# Patient Record
Sex: MALE | Race: WHITE | NOT HISPANIC OR LATINO | Employment: OTHER | RURAL
[De-identification: names, ages, dates, MRNs, and addresses within clinical notes are randomized per-mention and may not be internally consistent; named-entity substitution may affect disease eponyms.]

---

## 2017-03-29 ENCOUNTER — HISTORICAL (OUTPATIENT)
Dept: ADMINISTRATIVE | Facility: HOSPITAL | Age: 72
End: 2017-03-29

## 2017-03-30 LAB
LAB AP CLINICAL INFORMATION: NORMAL
LAB AP DIAGNOSIS - HISTORICAL: NORMAL
LAB AP GROSS PATHOLOGY - HISTORICAL: NORMAL
LAB AP SPECIMEN SUBMITTED - HISTORICAL: NORMAL

## 2018-03-07 ENCOUNTER — HISTORICAL (OUTPATIENT)
Dept: ADMINISTRATIVE | Facility: HOSPITAL | Age: 73
End: 2018-03-07

## 2018-12-12 LAB — COMPLEXED PSA SERPL-MCNC: 0.28 NG/ML

## 2020-08-19 ENCOUNTER — HISTORICAL (OUTPATIENT)
Dept: ADMINISTRATIVE | Facility: HOSPITAL | Age: 75
End: 2020-08-19

## 2020-08-19 LAB
ANION GAP SERPL CALCULATED.3IONS-SCNC: 9 MMOL/L
BASOPHILS # BLD AUTO: 0.07 X10E3/UL (ref 0–0.2)
BASOPHILS NFR BLD AUTO: 0.8 % (ref 0–1)
BUN SERPL-MCNC: 13 MG/DL (ref 7–18)
CALCIUM SERPL-MCNC: 8.7 MG/DL (ref 8.5–10.1)
CHLORIDE SERPL-SCNC: 107 MMOL/L (ref 98–107)
CO2 SERPL-SCNC: 28 MMOL/L (ref 21–32)
CREAT SERPL-MCNC: 1.07 MG/DL (ref 0.7–1.3)
EOSINOPHIL # BLD AUTO: 0.94 X10E3/UL (ref 0–0.5)
EOSINOPHIL NFR BLD AUTO: 11.2 % (ref 1–4)
ERYTHROCYTE [DISTWIDTH] IN BLOOD BY AUTOMATED COUNT: 12.6 % (ref 11.5–14.5)
GLUCOSE SERPL-MCNC: 104 MG/DL (ref 74–106)
HCT VFR BLD AUTO: 42.7 % (ref 40–54)
HGB BLD-MCNC: 14.4 G/DL (ref 13.5–18)
IMM GRANULOCYTES # BLD AUTO: 0.02 X10E3/UL (ref 0–0.04)
IMM GRANULOCYTES NFR BLD: 0.2 % (ref 0–0.4)
LYMPHOCYTES # BLD AUTO: 2.27 X10E3/UL (ref 1–4.8)
LYMPHOCYTES NFR BLD AUTO: 27 % (ref 27–41)
MCH RBC QN AUTO: 32.5 PG (ref 27–31)
MCHC RBC AUTO-ENTMCNC: 33.7 G/DL (ref 32–36)
MCV RBC AUTO: 96.4 FL (ref 80–96)
MONOCYTES # BLD AUTO: 0.74 X10E3/UL (ref 0–0.8)
MONOCYTES NFR BLD AUTO: 8.8 % (ref 2–6)
MPC BLD CALC-MCNC: 9.4 FL (ref 9.4–12.4)
NEUTROPHILS # BLD AUTO: 4.37 X10E3/UL (ref 1.8–7.7)
NEUTROPHILS NFR BLD AUTO: 52 % (ref 53–65)
NRBC # BLD AUTO: 0 X10E3/UL (ref 0–0)
NRBC, AUTO (.00): 0 /100 (ref 0–0)
PLATELET # BLD AUTO: 185 X10E3/UL (ref 150–400)
POTASSIUM SERPL-SCNC: 4.5 MMOL/L (ref 3.5–5.1)
RBC # BLD AUTO: 4.43 X10E6/UL (ref 4.6–6.2)
SODIUM SERPL-SCNC: 139 MMOL/L (ref 136–145)
WBC # BLD AUTO: 8.41 X10E3/UL (ref 4.5–11)

## 2020-08-20 LAB
INSULIN SERPL-ACNC: NORMAL U[IU]/ML
LAB AP CLINICAL INFORMATION: NORMAL
LAB AP COMMENTS: NORMAL
LAB AP DIAGNOSIS - HISTORICAL: NORMAL
LAB AP GROSS PATHOLOGY - HISTORICAL: NORMAL
LAB AP SPECIMEN SUBMITTED - HISTORICAL: NORMAL

## 2021-02-25 LAB
CHOLESTEROL, TOTAL: 194 MG/DL
HDLC SERPL-MCNC: 68 MG/DL
LDLC SERPL CALC-MCNC: 109 MG/DL
TRIGLYCERIDE (LIPID PAN): 84 MG/DL

## 2021-03-22 ENCOUNTER — PROCEDURE VISIT (OUTPATIENT)
Dept: UROLOGY | Facility: CLINIC | Age: 76
End: 2021-03-22
Payer: MEDICARE

## 2021-03-22 VITALS
HEART RATE: 84 BPM | HEIGHT: 70 IN | WEIGHT: 171 LBS | DIASTOLIC BLOOD PRESSURE: 71 MMHG | BODY MASS INDEX: 24.48 KG/M2 | SYSTOLIC BLOOD PRESSURE: 138 MMHG

## 2021-03-22 DIAGNOSIS — R82.90 URINE FINDING: ICD-10-CM

## 2021-03-22 DIAGNOSIS — C67.9 MALIGNANT NEOPLASM OF URINARY BLADDER, UNSPECIFIED SITE: Primary | ICD-10-CM

## 2021-03-22 LAB
BILIRUB SERPL-MCNC: NORMAL MG/DL
BLOOD URINE, POC: NORMAL
COLOR, POC UA: NORMAL
GLUCOSE UR QL STRIP: NORMAL
KETONES UR QL STRIP: NORMAL
LEUKOCYTE ESTERASE URINE, POC: NORMAL
NITRITE, POC UA: NORMAL
PH, POC UA: 5
PROTEIN, POC: NORMAL
SPECIFIC GRAVITY, POC UA: 1.02
UROBILINOGEN, POC UA: NORMAL

## 2021-03-22 PROCEDURE — 52000 CYSTOURETHROSCOPY: CPT | Mod: PBBFAC | Performed by: UROLOGY

## 2021-03-22 PROCEDURE — 52000 CYSTOSCOPY: ICD-10-PCS | Mod: S$PBB,,, | Performed by: UROLOGY

## 2021-03-22 PROCEDURE — 81001 URINALYSIS AUTO W/SCOPE: CPT | Mod: PBBFAC | Performed by: UROLOGY

## 2021-03-22 PROCEDURE — 81003 URINALYSIS AUTO W/O SCOPE: CPT | Mod: PBBFAC | Performed by: UROLOGY

## 2021-03-22 RX ORDER — TAMSULOSIN HYDROCHLORIDE 0.4 MG/1
0.4 CAPSULE ORAL DAILY
COMMUNITY
End: 2022-06-28 | Stop reason: SDUPTHER

## 2021-03-22 RX ORDER — TRAZODONE HYDROCHLORIDE 100 MG/1
100 TABLET ORAL NIGHTLY
COMMUNITY
End: 2021-12-09 | Stop reason: SDUPTHER

## 2021-03-22 RX ORDER — LISINOPRIL 20 MG/1
20 TABLET ORAL DAILY
COMMUNITY
End: 2022-01-31 | Stop reason: SDUPTHER

## 2021-03-22 RX ORDER — HYDROCODONE BITARTRATE AND ACETAMINOPHEN 7.5; 325 MG/1; MG/1
1 TABLET ORAL EVERY 12 HOURS PRN
Status: ON HOLD | COMMUNITY
Start: 2021-01-21 | End: 2022-03-09 | Stop reason: ALTCHOICE

## 2021-07-13 DIAGNOSIS — C67.9 PRIMARY MALIGNANT NEOPLASM OF BLADDER: Primary | ICD-10-CM

## 2021-08-17 ENCOUNTER — PROCEDURE VISIT (OUTPATIENT)
Dept: UROLOGY | Facility: CLINIC | Age: 76
End: 2021-08-17
Payer: MEDICARE

## 2021-08-17 VITALS
BODY MASS INDEX: 23.77 KG/M2 | HEIGHT: 70 IN | WEIGHT: 166 LBS | HEART RATE: 71 BPM | SYSTOLIC BLOOD PRESSURE: 141 MMHG | DIASTOLIC BLOOD PRESSURE: 74 MMHG

## 2021-08-17 DIAGNOSIS — C67.9 MALIGNANT NEOPLASM OF URINARY BLADDER, UNSPECIFIED SITE: ICD-10-CM

## 2021-08-17 DIAGNOSIS — C67.9 PRIMARY MALIGNANT NEOPLASM OF BLADDER: Primary | ICD-10-CM

## 2021-08-17 PROCEDURE — 52000 CYSTOURETHROSCOPY: CPT | Mod: PBBFAC | Performed by: UROLOGY

## 2021-08-17 PROCEDURE — 52000 CYSTOURETHROSCOPY: CPT | Mod: S$PBB,,, | Performed by: UROLOGY

## 2021-08-17 PROCEDURE — 52000 PR CYSTOURETHROSCOPY: ICD-10-PCS | Mod: S$PBB,,, | Performed by: UROLOGY

## 2021-08-27 ENCOUNTER — OFFICE VISIT (OUTPATIENT)
Dept: FAMILY MEDICINE | Facility: CLINIC | Age: 76
End: 2021-08-27
Payer: MEDICARE

## 2021-08-27 VITALS
HEIGHT: 70 IN | DIASTOLIC BLOOD PRESSURE: 60 MMHG | HEART RATE: 50 BPM | SYSTOLIC BLOOD PRESSURE: 158 MMHG | RESPIRATION RATE: 18 BRPM | WEIGHT: 164.63 LBS | BODY MASS INDEX: 23.57 KG/M2

## 2021-08-27 DIAGNOSIS — E78.2 MIXED HYPERLIPIDEMIA: ICD-10-CM

## 2021-08-27 DIAGNOSIS — C44.90 SKIN CANCER: ICD-10-CM

## 2021-08-27 DIAGNOSIS — I10 ESSENTIAL HYPERTENSION: Primary | ICD-10-CM

## 2021-08-27 LAB
ALBUMIN SERPL BCP-MCNC: 4 G/DL (ref 3.5–5)
ALBUMIN/GLOB SERPL: 1.1 {RATIO}
ALP SERPL-CCNC: 79 U/L (ref 45–115)
ALT SERPL W P-5'-P-CCNC: 21 U/L (ref 16–61)
ANION GAP SERPL CALCULATED.3IONS-SCNC: 9 MMOL/L (ref 7–16)
AST SERPL W P-5'-P-CCNC: 18 U/L (ref 15–37)
BASOPHILS # BLD AUTO: 0.08 K/UL (ref 0–0.2)
BASOPHILS NFR BLD AUTO: 0.9 % (ref 0–1)
BILIRUB SERPL-MCNC: 0.9 MG/DL (ref 0–1.2)
BUN SERPL-MCNC: 14 MG/DL (ref 7–18)
BUN/CREAT SERPL: 14 (ref 6–20)
CALCIUM SERPL-MCNC: 9.2 MG/DL (ref 8.5–10.1)
CHLORIDE SERPL-SCNC: 110 MMOL/L (ref 98–107)
CHOLEST SERPL-MCNC: 208 MG/DL (ref 0–200)
CHOLEST/HDLC SERPL: 2.8 {RATIO}
CO2 SERPL-SCNC: 28 MMOL/L (ref 21–32)
CREAT SERPL-MCNC: 1.03 MG/DL (ref 0.7–1.3)
DIFFERENTIAL METHOD BLD: ABNORMAL
EOSINOPHIL # BLD AUTO: 0.73 K/UL (ref 0–0.5)
EOSINOPHIL NFR BLD AUTO: 8.2 % (ref 1–4)
ERYTHROCYTE [DISTWIDTH] IN BLOOD BY AUTOMATED COUNT: 13.1 % (ref 11.5–14.5)
GLOBULIN SER-MCNC: 3.5 G/DL (ref 2–4)
GLUCOSE SERPL-MCNC: 106 MG/DL (ref 74–106)
HCT VFR BLD AUTO: 45.3 % (ref 40–54)
HDLC SERPL-MCNC: 74 MG/DL (ref 40–60)
HGB BLD-MCNC: 15.3 G/DL (ref 13.5–18)
IMM GRANULOCYTES # BLD AUTO: 0.03 K/UL (ref 0–0.04)
IMM GRANULOCYTES NFR BLD: 0.3 % (ref 0–0.4)
LDLC SERPL CALC-MCNC: 112 MG/DL
LDLC/HDLC SERPL: 1.5 {RATIO}
LYMPHOCYTES # BLD AUTO: 2.97 K/UL (ref 1–4.8)
LYMPHOCYTES NFR BLD AUTO: 33.3 % (ref 27–41)
MCH RBC QN AUTO: 32 PG (ref 27–31)
MCHC RBC AUTO-ENTMCNC: 33.8 G/DL (ref 32–36)
MCV RBC AUTO: 94.8 FL (ref 80–96)
MONOCYTES # BLD AUTO: 0.64 K/UL (ref 0–0.8)
MONOCYTES NFR BLD AUTO: 7.2 % (ref 2–6)
MPC BLD CALC-MCNC: 10.2 FL (ref 9.4–12.4)
NEUTROPHILS # BLD AUTO: 4.47 K/UL (ref 1.8–7.7)
NEUTROPHILS NFR BLD AUTO: 50.1 % (ref 53–65)
NONHDLC SERPL-MCNC: 134 MG/DL
NRBC # BLD AUTO: 0 X10E3/UL
NRBC, AUTO (.00): 0 %
PLATELET # BLD AUTO: 224 K/UL (ref 150–400)
POTASSIUM SERPL-SCNC: 5.2 MMOL/L (ref 3.5–5.1)
PROT SERPL-MCNC: 7.5 G/DL (ref 6.4–8.2)
RBC # BLD AUTO: 4.78 M/UL (ref 4.6–6.2)
SODIUM SERPL-SCNC: 142 MMOL/L (ref 136–145)
TRIGL SERPL-MCNC: 108 MG/DL (ref 35–150)
VLDLC SERPL-MCNC: 22 MG/DL
WBC # BLD AUTO: 8.92 K/UL (ref 4.5–11)

## 2021-08-27 PROCEDURE — 85025 COMPLETE CBC W/AUTO DIFF WBC: CPT | Mod: ,,, | Performed by: CLINICAL MEDICAL LABORATORY

## 2021-08-27 PROCEDURE — 80053 COMPREHENSIVE METABOLIC PANEL: ICD-10-PCS | Mod: ,,, | Performed by: CLINICAL MEDICAL LABORATORY

## 2021-08-27 PROCEDURE — 85025 CBC WITH DIFFERENTIAL: ICD-10-PCS | Mod: ,,, | Performed by: CLINICAL MEDICAL LABORATORY

## 2021-08-27 PROCEDURE — 80061 LIPID PANEL: ICD-10-PCS | Mod: ,,, | Performed by: CLINICAL MEDICAL LABORATORY

## 2021-08-27 PROCEDURE — 99214 PR OFFICE/OUTPT VISIT, EST, LEVL IV, 30-39 MIN: ICD-10-PCS | Mod: ,,, | Performed by: FAMILY MEDICINE

## 2021-08-27 PROCEDURE — 99214 OFFICE O/P EST MOD 30 MIN: CPT | Mod: ,,, | Performed by: FAMILY MEDICINE

## 2021-08-27 PROCEDURE — 80061 LIPID PANEL: CPT | Mod: ,,, | Performed by: CLINICAL MEDICAL LABORATORY

## 2021-08-27 PROCEDURE — 80053 COMPREHEN METABOLIC PANEL: CPT | Mod: ,,, | Performed by: CLINICAL MEDICAL LABORATORY

## 2021-08-27 RX ORDER — LISINOPRIL 20 MG/1
20 TABLET ORAL DAILY
Qty: 90 TABLET | Refills: 3 | Status: CANCELLED | OUTPATIENT
Start: 2021-08-27

## 2021-08-27 RX ORDER — LISINOPRIL AND HYDROCHLOROTHIAZIDE 12.5; 2 MG/1; MG/1
1 TABLET ORAL DAILY
Qty: 90 TABLET | Refills: 3 | Status: SHIPPED | OUTPATIENT
Start: 2021-08-27 | End: 2022-01-31

## 2021-10-15 ENCOUNTER — OFFICE VISIT (OUTPATIENT)
Dept: DERMATOLOGY | Facility: CLINIC | Age: 76
End: 2021-10-15
Payer: MEDICARE

## 2021-10-15 VITALS — HEIGHT: 70 IN | RESPIRATION RATE: 20 BRPM | BODY MASS INDEX: 23.58 KG/M2 | WEIGHT: 164.69 LBS

## 2021-10-15 DIAGNOSIS — L82.1 SEBORRHEIC KERATOSES: ICD-10-CM

## 2021-10-15 DIAGNOSIS — L57.0 ACTINIC KERATOSES: ICD-10-CM

## 2021-10-15 DIAGNOSIS — C44.90 SKIN CANCER: ICD-10-CM

## 2021-10-15 DIAGNOSIS — D48.9 NEOPLASM OF UNCERTAIN BEHAVIOR: Primary | ICD-10-CM

## 2021-10-15 PROCEDURE — 99203 OFFICE O/P NEW LOW 30 MIN: CPT | Mod: 25,,, | Performed by: STUDENT IN AN ORGANIZED HEALTH CARE EDUCATION/TRAINING PROGRAM

## 2021-10-15 PROCEDURE — 17000 DESTRUCT PREMALG LESION: CPT | Mod: XU,,, | Performed by: STUDENT IN AN ORGANIZED HEALTH CARE EDUCATION/TRAINING PROGRAM

## 2021-10-15 PROCEDURE — 17003 DESTRUCT PREMALG LES 2-14: CPT | Mod: XU,,, | Performed by: STUDENT IN AN ORGANIZED HEALTH CARE EDUCATION/TRAINING PROGRAM

## 2021-10-15 PROCEDURE — 99203 PR OFFICE/OUTPT VISIT, NEW, LEVL III, 30-44 MIN: ICD-10-PCS | Mod: 25,,, | Performed by: STUDENT IN AN ORGANIZED HEALTH CARE EDUCATION/TRAINING PROGRAM

## 2021-10-15 PROCEDURE — 17000 PR DESTRUCTION(LASER SURGERY,CRYOSURGERY,CHEMOSURGERY),PREMALIGNANT LESIONS,FIRST LESION: ICD-10-PCS | Mod: XU,,, | Performed by: STUDENT IN AN ORGANIZED HEALTH CARE EDUCATION/TRAINING PROGRAM

## 2021-10-15 PROCEDURE — 11102 TANGNTL BX SKIN SINGLE LES: CPT | Mod: ,,, | Performed by: STUDENT IN AN ORGANIZED HEALTH CARE EDUCATION/TRAINING PROGRAM

## 2021-10-15 PROCEDURE — 88305 TISSUE EXAM BY PATHOLOGIST: CPT | Mod: 26,,, | Performed by: PATHOLOGY

## 2021-10-15 PROCEDURE — 17003 DESTRUCTION, PREMALIGNANT LESIONS; SECOND THROUGH 14 LESIONS: ICD-10-PCS | Mod: XU,,, | Performed by: STUDENT IN AN ORGANIZED HEALTH CARE EDUCATION/TRAINING PROGRAM

## 2021-10-15 PROCEDURE — 88305 PATHOLOGY, DERMATOLOGY: ICD-10-PCS | Mod: 26,,, | Performed by: PATHOLOGY

## 2021-10-15 PROCEDURE — 11102 PR TANGENTIAL BIOPSY, SKIN, SINGLE LESION: ICD-10-PCS | Mod: ,,, | Performed by: STUDENT IN AN ORGANIZED HEALTH CARE EDUCATION/TRAINING PROGRAM

## 2021-10-15 PROCEDURE — 11103 TANGNTL BX SKIN EA SEP/ADDL: CPT | Mod: ,,, | Performed by: STUDENT IN AN ORGANIZED HEALTH CARE EDUCATION/TRAINING PROGRAM

## 2021-10-15 PROCEDURE — 88305 TISSUE EXAM BY PATHOLOGIST: CPT | Mod: SUR | Performed by: STUDENT IN AN ORGANIZED HEALTH CARE EDUCATION/TRAINING PROGRAM

## 2021-10-15 PROCEDURE — 11103 PR TANGENTIAL BIOPSY, SKIN, EA ADDTL LESION: ICD-10-PCS | Mod: ,,, | Performed by: STUDENT IN AN ORGANIZED HEALTH CARE EDUCATION/TRAINING PROGRAM

## 2021-10-20 LAB
DHEA SERPL-MCNC: NORMAL
ESTROGEN SERPL-MCNC: NORMAL PG/ML
ESTROGEN SERPL-MCNC: NORMAL PG/ML
LAB AP GROSS DESCRIPTION: NORMAL
LAB AP GROSS DESCRIPTION: NORMAL
LAB AP LABORATORY NOTES: NORMAL
LAB AP LABORATORY NOTES: NORMAL
LAB AP SPEC A DDX: NORMAL
LAB AP SPEC A DDX: NORMAL
LAB AP SPEC A MORPHOLOGY: NORMAL
LAB AP SPEC A MORPHOLOGY: NORMAL
LAB AP SPEC A PROCEDURE: NORMAL
LAB AP SPEC A PROCEDURE: NORMAL
T3RU NFR SERPL: NORMAL %
T3RU NFR SERPL: NORMAL %

## 2021-12-01 ENCOUNTER — PROCEDURE VISIT (OUTPATIENT)
Dept: DERMATOLOGY | Facility: CLINIC | Age: 76
End: 2021-12-01
Payer: MEDICARE

## 2021-12-01 VITALS
HEART RATE: 49 BPM | HEIGHT: 70 IN | BODY MASS INDEX: 23.48 KG/M2 | SYSTOLIC BLOOD PRESSURE: 164 MMHG | DIASTOLIC BLOOD PRESSURE: 74 MMHG | WEIGHT: 164 LBS | RESPIRATION RATE: 18 BRPM

## 2021-12-01 DIAGNOSIS — C44.319 BASAL CELL CARCINOMA (BCC) OF RIGHT CHEEK: Primary | ICD-10-CM

## 2021-12-01 PROCEDURE — 17312 MOHS ADDL STAGE: CPT | Mod: ,,, | Performed by: STUDENT IN AN ORGANIZED HEALTH CARE EDUCATION/TRAINING PROGRAM

## 2021-12-01 PROCEDURE — 12052 INTMD RPR FACE/MM 2.6-5.0 CM: CPT | Mod: 51,,, | Performed by: STUDENT IN AN ORGANIZED HEALTH CARE EDUCATION/TRAINING PROGRAM

## 2021-12-01 PROCEDURE — 17311: ICD-10-PCS | Mod: ,,, | Performed by: STUDENT IN AN ORGANIZED HEALTH CARE EDUCATION/TRAINING PROGRAM

## 2021-12-01 PROCEDURE — 17312: ICD-10-PCS | Mod: ,,, | Performed by: STUDENT IN AN ORGANIZED HEALTH CARE EDUCATION/TRAINING PROGRAM

## 2021-12-01 PROCEDURE — 12052 PR INTERMED WOUND REPAIR FACE/EAR/EYELID/NOSE/LIP/MUC MEBR, 2.6 TO 5.0CM: ICD-10-PCS | Mod: 51,,, | Performed by: STUDENT IN AN ORGANIZED HEALTH CARE EDUCATION/TRAINING PROGRAM

## 2021-12-01 PROCEDURE — 17311 MOHS 1 STAGE H/N/HF/G: CPT | Mod: ,,, | Performed by: STUDENT IN AN ORGANIZED HEALTH CARE EDUCATION/TRAINING PROGRAM

## 2021-12-08 ENCOUNTER — PROCEDURE VISIT (OUTPATIENT)
Dept: DERMATOLOGY | Facility: CLINIC | Age: 76
End: 2021-12-08
Payer: MEDICARE

## 2021-12-08 VITALS
RESPIRATION RATE: 18 BRPM | SYSTOLIC BLOOD PRESSURE: 190 MMHG | HEIGHT: 70 IN | WEIGHT: 164 LBS | DIASTOLIC BLOOD PRESSURE: 87 MMHG | BODY MASS INDEX: 23.48 KG/M2

## 2021-12-08 DIAGNOSIS — C44.719 BASAL CELL CARCINOMA (BCC) OF LEFT LOWER LEG: Primary | ICD-10-CM

## 2021-12-08 PROCEDURE — 17314 MOHS ADDL STAGE T/A/L: CPT | Mod: 79,,, | Performed by: STUDENT IN AN ORGANIZED HEALTH CARE EDUCATION/TRAINING PROGRAM

## 2021-12-08 PROCEDURE — 17314: ICD-10-PCS | Mod: 79,,, | Performed by: STUDENT IN AN ORGANIZED HEALTH CARE EDUCATION/TRAINING PROGRAM

## 2021-12-08 PROCEDURE — 12032 INTMD RPR S/A/T/EXT 2.6-7.5: CPT | Mod: 79,51,, | Performed by: STUDENT IN AN ORGANIZED HEALTH CARE EDUCATION/TRAINING PROGRAM

## 2021-12-08 PROCEDURE — 12032 PR LAYR CLOS WND TRUNK,ARM,LEG 2.6-7.5 CM: ICD-10-PCS | Mod: 79,51,, | Performed by: STUDENT IN AN ORGANIZED HEALTH CARE EDUCATION/TRAINING PROGRAM

## 2021-12-08 PROCEDURE — 17313 MOHS 1 STAGE T/A/L: CPT | Mod: 79,,, | Performed by: STUDENT IN AN ORGANIZED HEALTH CARE EDUCATION/TRAINING PROGRAM

## 2021-12-08 PROCEDURE — 17313: ICD-10-PCS | Mod: 79,,, | Performed by: STUDENT IN AN ORGANIZED HEALTH CARE EDUCATION/TRAINING PROGRAM

## 2021-12-09 RX ORDER — TRAZODONE HYDROCHLORIDE 100 MG/1
100 TABLET ORAL NIGHTLY
Qty: 30 TABLET | Refills: 0 | Status: SHIPPED | OUTPATIENT
Start: 2021-12-09 | End: 2022-01-03 | Stop reason: SDUPTHER

## 2021-12-15 ENCOUNTER — PROCEDURE VISIT (OUTPATIENT)
Dept: UROLOGY | Facility: CLINIC | Age: 76
End: 2021-12-15
Payer: MEDICARE

## 2021-12-15 VITALS
BODY MASS INDEX: 24.77 KG/M2 | HEART RATE: 86 BPM | DIASTOLIC BLOOD PRESSURE: 76 MMHG | HEIGHT: 70 IN | WEIGHT: 173 LBS | SYSTOLIC BLOOD PRESSURE: 158 MMHG

## 2021-12-15 DIAGNOSIS — N32.89 DYSTROPHIC CALCIFICATION OF BLADDER: Primary | ICD-10-CM

## 2021-12-15 DIAGNOSIS — Z01.810 PRE-PROCEDURAL CARDIOVASCULAR EXAMINATION: ICD-10-CM

## 2021-12-15 DIAGNOSIS — C67.9 PRIMARY MALIGNANT NEOPLASM OF BLADDER: ICD-10-CM

## 2021-12-15 DIAGNOSIS — N39.0 URINARY TRACT INFECTION WITH HEMATURIA, SITE UNSPECIFIED: ICD-10-CM

## 2021-12-15 DIAGNOSIS — R31.9 URINARY TRACT INFECTION WITH HEMATURIA, SITE UNSPECIFIED: ICD-10-CM

## 2021-12-15 DIAGNOSIS — Z01.818 PREOP TESTING: ICD-10-CM

## 2021-12-15 PROCEDURE — 87086 CULTURE, URINE: ICD-10-PCS | Mod: ,,, | Performed by: CLINICAL MEDICAL LABORATORY

## 2021-12-15 PROCEDURE — 52000 CYSTOURETHROSCOPY: CPT | Mod: PBBFAC | Performed by: UROLOGY

## 2021-12-15 PROCEDURE — 87086 URINE CULTURE/COLONY COUNT: CPT | Mod: ,,, | Performed by: CLINICAL MEDICAL LABORATORY

## 2021-12-15 PROCEDURE — 52000 CYSTOURETHROSCOPY: CPT | Mod: S$PBB,,, | Performed by: UROLOGY

## 2021-12-15 PROCEDURE — 52000 PR CYSTOURETHROSCOPY: ICD-10-PCS | Mod: S$PBB,,, | Performed by: UROLOGY

## 2021-12-15 RX ORDER — LIDOCAINE HYDROCHLORIDE 20 MG/ML
JELLY TOPICAL
Status: COMPLETED | OUTPATIENT
Start: 2021-12-15 | End: 2021-12-15

## 2021-12-15 RX ADMIN — LIDOCAINE HYDROCHLORIDE 10 ML: 20 JELLY TOPICAL at 04:12

## 2021-12-17 ENCOUNTER — CLINICAL SUPPORT (OUTPATIENT)
Dept: DERMATOLOGY | Facility: CLINIC | Age: 76
End: 2021-12-17
Payer: MEDICARE

## 2021-12-17 DIAGNOSIS — L08.9 SKIN INFECTION: ICD-10-CM

## 2021-12-17 DIAGNOSIS — Z48.89 ENCOUNTER FOR POST SURGICAL WOUND CHECK: Primary | ICD-10-CM

## 2021-12-17 LAB — UA COMPLETE W REFLEX CULTURE PNL UR: NORMAL

## 2021-12-17 PROCEDURE — 99499 UNLISTED E&M SERVICE: CPT | Mod: ,,, | Performed by: STUDENT IN AN ORGANIZED HEALTH CARE EDUCATION/TRAINING PROGRAM

## 2021-12-17 PROCEDURE — 99499 NO LOS: ICD-10-PCS | Mod: ,,, | Performed by: STUDENT IN AN ORGANIZED HEALTH CARE EDUCATION/TRAINING PROGRAM

## 2021-12-17 RX ORDER — CLINDAMYCIN HYDROCHLORIDE 300 MG/1
300 CAPSULE ORAL EVERY 6 HOURS
Qty: 28 CAPSULE | Refills: 0 | Status: SHIPPED | OUTPATIENT
Start: 2021-12-17 | End: 2021-12-24

## 2021-12-20 PROCEDURE — 87186 SC STD MICRODIL/AGAR DIL: CPT | Mod: ,,, | Performed by: CLINICAL MEDICAL LABORATORY

## 2021-12-20 PROCEDURE — 87070 CULTURE, WOUND: ICD-10-PCS | Mod: ,,, | Performed by: CLINICAL MEDICAL LABORATORY

## 2021-12-20 PROCEDURE — 87077 CULTURE, WOUND: ICD-10-PCS | Mod: ,,, | Performed by: CLINICAL MEDICAL LABORATORY

## 2021-12-20 PROCEDURE — 87070 CULTURE OTHR SPECIMN AEROBIC: CPT | Mod: ,,, | Performed by: CLINICAL MEDICAL LABORATORY

## 2021-12-20 PROCEDURE — 87077 CULTURE AEROBIC IDENTIFY: CPT | Mod: ,,, | Performed by: CLINICAL MEDICAL LABORATORY

## 2021-12-20 PROCEDURE — 87186 CULTURE, WOUND: ICD-10-PCS | Mod: ,,, | Performed by: CLINICAL MEDICAL LABORATORY

## 2021-12-21 ENCOUNTER — CLINICAL SUPPORT (OUTPATIENT)
Dept: DERMATOLOGY | Facility: CLINIC | Age: 76
End: 2021-12-21
Payer: MEDICARE

## 2021-12-21 DIAGNOSIS — Z48.89 ENCOUNTER FOR POST SURGICAL WOUND CHECK: Primary | ICD-10-CM

## 2021-12-22 LAB — MICROORGANISM SPEC CULT: ABNORMAL

## 2022-01-05 ENCOUNTER — TELEPHONE (OUTPATIENT)
Dept: FAMILY MEDICINE | Facility: CLINIC | Age: 77
End: 2022-01-05
Payer: MEDICARE

## 2022-01-07 ENCOUNTER — CLINICAL SUPPORT (OUTPATIENT)
Dept: DERMATOLOGY | Facility: CLINIC | Age: 77
End: 2022-01-07
Payer: MEDICARE

## 2022-01-07 DIAGNOSIS — Z51.89 VISIT FOR WOUND CHECK: Primary | ICD-10-CM

## 2022-01-07 PROCEDURE — 99499 UNLISTED E&M SERVICE: CPT | Mod: ,,, | Performed by: STUDENT IN AN ORGANIZED HEALTH CARE EDUCATION/TRAINING PROGRAM

## 2022-01-07 PROCEDURE — 99499 NO LOS: ICD-10-PCS | Mod: ,,, | Performed by: STUDENT IN AN ORGANIZED HEALTH CARE EDUCATION/TRAINING PROGRAM

## 2022-01-07 NOTE — PROGRESS NOTES
Wound healing well but pt report shooting pain in the wound at night. MRSA infection appears resolved. Discussed this could be sensory nerves regenerating. Will monitor for now

## 2022-01-31 RX ORDER — TRAZODONE HYDROCHLORIDE 100 MG/1
100 TABLET ORAL NIGHTLY
Qty: 30 TABLET | Refills: 1 | Status: SHIPPED | OUTPATIENT
Start: 2022-01-31 | End: 2022-04-04 | Stop reason: SDUPTHER

## 2022-01-31 RX ORDER — LISINOPRIL 20 MG/1
20 TABLET ORAL DAILY
Qty: 30 TABLET | Refills: 3 | Status: SHIPPED | OUTPATIENT
Start: 2022-01-31 | End: 2022-07-25

## 2022-03-07 ENCOUNTER — CLINICAL SUPPORT (OUTPATIENT)
Dept: CARDIOLOGY | Facility: CLINIC | Age: 77
End: 2022-03-07
Payer: MEDICARE

## 2022-03-07 ENCOUNTER — OFFICE VISIT (OUTPATIENT)
Dept: UROLOGY | Facility: CLINIC | Age: 77
End: 2022-03-07
Payer: MEDICARE

## 2022-03-07 VITALS
SYSTOLIC BLOOD PRESSURE: 176 MMHG | WEIGHT: 175 LBS | DIASTOLIC BLOOD PRESSURE: 86 MMHG | HEIGHT: 70 IN | HEART RATE: 69 BPM | BODY MASS INDEX: 25.05 KG/M2

## 2022-03-07 DIAGNOSIS — Z01.812 ENCOUNTER FOR PREOPERATIVE SCREENING LABORATORY TESTING FOR COVID-19 VIRUS: ICD-10-CM

## 2022-03-07 DIAGNOSIS — C67.9 TRANSITIONAL CELL CARCINOMA OF BLADDER: Primary | ICD-10-CM

## 2022-03-07 DIAGNOSIS — C67.9 MALIGNANT NEOPLASM OF URINARY BLADDER, UNSPECIFIED SITE: Primary | ICD-10-CM

## 2022-03-07 DIAGNOSIS — R31.9 URINARY TRACT INFECTION WITH HEMATURIA, SITE UNSPECIFIED: ICD-10-CM

## 2022-03-07 DIAGNOSIS — N39.0 URINARY TRACT INFECTION WITH HEMATURIA, SITE UNSPECIFIED: ICD-10-CM

## 2022-03-07 DIAGNOSIS — Z01.810 PRE-PROCEDURAL CARDIOVASCULAR EXAMINATION: ICD-10-CM

## 2022-03-07 DIAGNOSIS — Z11.52 ENCOUNTER FOR PREOPERATIVE SCREENING LABORATORY TESTING FOR COVID-19 VIRUS: ICD-10-CM

## 2022-03-07 PROCEDURE — 93005 ELECTROCARDIOGRAM TRACING: CPT | Mod: PBBFAC | Performed by: INTERNAL MEDICINE

## 2022-03-07 PROCEDURE — 99213 OFFICE O/P EST LOW 20 MIN: CPT | Mod: S$PBB,,, | Performed by: UROLOGY

## 2022-03-07 PROCEDURE — 87086 CULTURE, URINE: ICD-10-PCS | Mod: ,,, | Performed by: CLINICAL MEDICAL LABORATORY

## 2022-03-07 PROCEDURE — 99213 OFFICE O/P EST LOW 20 MIN: CPT | Mod: PBBFAC | Performed by: UROLOGY

## 2022-03-07 PROCEDURE — 93010 EKG 12-LEAD: ICD-10-PCS | Mod: S$PBB,,, | Performed by: INTERNAL MEDICINE

## 2022-03-07 PROCEDURE — 87186 SC STD MICRODIL/AGAR DIL: CPT | Mod: ,,, | Performed by: CLINICAL MEDICAL LABORATORY

## 2022-03-07 PROCEDURE — 87077 CULTURE AEROBIC IDENTIFY: CPT | Mod: ,,, | Performed by: CLINICAL MEDICAL LABORATORY

## 2022-03-07 PROCEDURE — 87077 CULTURE, URINE: ICD-10-PCS | Mod: ,,, | Performed by: CLINICAL MEDICAL LABORATORY

## 2022-03-07 PROCEDURE — 87186 CULTURE, URINE: ICD-10-PCS | Mod: ,,, | Performed by: CLINICAL MEDICAL LABORATORY

## 2022-03-07 PROCEDURE — 99212 OFFICE O/P EST SF 10 MIN: CPT | Mod: PBBFAC,27

## 2022-03-07 PROCEDURE — 99213 PR OFFICE/OUTPT VISIT, EST, LEVL III, 20-29 MIN: ICD-10-PCS | Mod: S$PBB,,, | Performed by: UROLOGY

## 2022-03-07 PROCEDURE — 87086 URINE CULTURE/COLONY COUNT: CPT | Mod: ,,, | Performed by: CLINICAL MEDICAL LABORATORY

## 2022-03-07 PROCEDURE — 93010 ELECTROCARDIOGRAM REPORT: CPT | Mod: S$PBB,,, | Performed by: INTERNAL MEDICINE

## 2022-03-07 NOTE — H&P (VIEW-ONLY)
Subjective:       Patient ID: Ruben Figueroa is a 76 y.o. male.    Chief Complaint: Pre-op Exam (CBC, BMP, COVID, EKG)      Follow up in 2 months (on 2/14/2022) for Pre op workup; CBC, BMP lab and EKG prior to visit.  Patient seems to have more problem with abnormal calcifications of the bladder than from primary bladder tumor although I cannot rule out underlying recurrence of TCC.  Patient's bladder needs to have a calcification removed and underlying tissue biopsied to make sure there is not recurrent TCC.  That will have to be done under anesthesia because of the pain involved.  Mr. Figueroa wants to wait a couple months before doing that and that is probably reasonable.  We will plan on surgery Wednesday February 16th.  Return for workup on Monday February 14.  Patient follow-up blood pressure 158/76.  Apparently blood pressure has been running a little high lately.  He was given samples of hydrochlorothiazide 25 mg.  He needs to continue to monitor blood pressure to see if addition of HCTZ helpful.  Probably needs to be monitored by Dr. Gonzalez. (12/15/2021)    The note above is from the December visit for cystoscopy.  Patient still has extensive calcifications overlying the tumor sites and needs to have biopsies of the tissue as well as removal of the calcified tissue.  Bladder is thin where he has had previous tumors removed.  Patient postponed the procedure in extra month because of family problems.  He is doing reasonably well.  Voiding satisfactorily with aid of tamsulosin.  He is ready to proceed with the procedure for removal a calcification and bladder biopsies.  Surgery scheduled for Wednesday.  (March 7, 2022)             Review of Systems      Objective:      Physical Exam  Constitutional:       Appearance: Normal appearance. He is normal weight.   HENT:      Right Ear: External ear normal.      Left Ear: External ear normal.      Mouth/Throat:      Mouth: Mucous membranes are moist.      Pharynx:  Oropharynx is clear.   Eyes:      Pupils: Pupils are equal, round, and reactive to light.   Neck:      Vascular: No carotid bruit.   Cardiovascular:      Rate and Rhythm: Normal rate. Rhythm irregular.      Comments: Occasional premature contractions  Pulmonary:      Effort: Pulmonary effort is normal.      Breath sounds: Normal breath sounds.   Abdominal:      Palpations: There is no mass.      Tenderness: There is no abdominal tenderness. There is no right CVA tenderness, left CVA tenderness or guarding.   Musculoskeletal:      Cervical back: Neck supple.   Skin:     General: Skin is warm and dry.   Neurological:      General: No focal deficit present.      Mental Status: He is alert.   Psychiatric:         Mood and Affect: Mood normal.         Behavior: Behavior normal.       urinalysis suggestive of infection as there was a lot of amorphous material in probable bacteria on microscopic urinalysis.  Several pus cells also noted along with the amorphous crystals.  The dipstick is nitrite positive with 3+ blood, 2+ leukocytes, trace of glucose, pH 6.0, and urine specific gravity 1.020.  Assessment:       Problem List Items Addressed This Visit    None     Visit Diagnoses     Transitional cell carcinoma of bladder    -  Primary    Encounter for preoperative screening laboratory testing for COVID-19 virus        Relevant Orders    COVID-19 Routine Screening (Completed)    Urinary tract infection with hematuria, site unspecified        Relevant Orders    Urine culture          Plan:     Lab data satisfactory.  Urinalysis suggested infection.    Patient given Cipro 500 mg b.i.d. for 3 days while awaiting culture results.  He will be admitted to outpatient surgery Wednesday March 9 at 5:30 a.m. and be NPO other than morning medications.  Instructions for preadmission given by Ms. Fernandez and understood by Mr. Figueroa.  Informed consent obtained.  *

## 2022-03-08 DIAGNOSIS — C67.9 TRANSITIONAL CELL CARCINOMA OF BLADDER: Primary | ICD-10-CM

## 2022-03-08 RX ORDER — SODIUM CHLORIDE, SODIUM LACTATE, POTASSIUM CHLORIDE, CALCIUM CHLORIDE 600; 310; 30; 20 MG/100ML; MG/100ML; MG/100ML; MG/100ML
INJECTION, SOLUTION INTRAVENOUS CONTINUOUS
Status: CANCELLED | OUTPATIENT
Start: 2022-03-09

## 2022-03-08 NOTE — PATIENT INSTRUCTIONS
Lab data satisfactory.  Urinalysis suggested infection.    Patient given Cipro 500 mg b.i.d. for 3 days while awaiting culture results.  He will be admitted to outpatient surgery Wednesday March 9 at 5:30 a.m. and be NPO other than morning medications.  Instructions for preadmission given by Ms. Fernandez and understood by Mr. Figueroa.  Informed consent obtained.

## 2022-03-09 ENCOUNTER — HOSPITAL ENCOUNTER (OUTPATIENT)
Facility: HOSPITAL | Age: 77
Discharge: HOME | End: 2022-03-09
Attending: UROLOGY | Admitting: UROLOGY
Payer: MEDICARE

## 2022-03-09 ENCOUNTER — ANESTHESIA (OUTPATIENT)
Dept: SURGERY | Facility: HOSPITAL | Age: 77
End: 2022-03-09
Payer: MEDICARE

## 2022-03-09 ENCOUNTER — ANESTHESIA EVENT (OUTPATIENT)
Dept: SURGERY | Facility: HOSPITAL | Age: 77
End: 2022-03-09
Payer: MEDICARE

## 2022-03-09 VITALS
HEART RATE: 81 BPM | WEIGHT: 173 LBS | TEMPERATURE: 98 F | BODY MASS INDEX: 24.77 KG/M2 | DIASTOLIC BLOOD PRESSURE: 80 MMHG | RESPIRATION RATE: 18 BRPM | OXYGEN SATURATION: 97 % | SYSTOLIC BLOOD PRESSURE: 153 MMHG | HEIGHT: 70 IN

## 2022-03-09 DIAGNOSIS — C67.9 MALIGNANT NEOPLASM OF URINARY BLADDER, UNSPECIFIED SITE: ICD-10-CM

## 2022-03-09 DIAGNOSIS — C67.9 TRANSITIONAL CELL CARCINOMA OF BLADDER: ICD-10-CM

## 2022-03-09 PROCEDURE — D9220A PRA ANESTHESIA: ICD-10-PCS | Mod: CRNA,,, | Performed by: NURSE ANESTHETIST, CERTIFIED REGISTERED

## 2022-03-09 PROCEDURE — 71000016 HC POSTOP RECOV ADDL HR: Performed by: UROLOGY

## 2022-03-09 PROCEDURE — 27000716 HC OXISENSOR PROBE, ANY SIZE: Performed by: ANESTHESIOLOGY

## 2022-03-09 PROCEDURE — 27000177 HC AIRWAY, LARYNGEAL MASK: Performed by: ANESTHESIOLOGY

## 2022-03-09 PROCEDURE — D9220A PRA ANESTHESIA: Mod: CRNA,,, | Performed by: NURSE ANESTHETIST, CERTIFIED REGISTERED

## 2022-03-09 PROCEDURE — 37000009 HC ANESTHESIA EA ADD 15 MINS: Performed by: UROLOGY

## 2022-03-09 PROCEDURE — 25000003 PHARM REV CODE 250: Performed by: NURSE ANESTHETIST, CERTIFIED REGISTERED

## 2022-03-09 PROCEDURE — 27000260 *HC AIRWAY ORAL: Performed by: ANESTHESIOLOGY

## 2022-03-09 PROCEDURE — 88305 TISSUE EXAM BY PATHOLOGIST: CPT | Mod: 26,,, | Performed by: PATHOLOGY

## 2022-03-09 PROCEDURE — 71000033 HC RECOVERY, INTIAL HOUR: Performed by: UROLOGY

## 2022-03-09 PROCEDURE — D9220A PRA ANESTHESIA: ICD-10-PCS | Mod: ANES,ICN,, | Performed by: ANESTHESIOLOGY

## 2022-03-09 PROCEDURE — 36000706: Performed by: UROLOGY

## 2022-03-09 PROCEDURE — 88305 TISSUE EXAM BY PATHOLOGIST: CPT | Mod: SUR | Performed by: UROLOGY

## 2022-03-09 PROCEDURE — 52234 CYSTOSCOPY AND TREATMENT: CPT | Mod: ,,, | Performed by: UROLOGY

## 2022-03-09 PROCEDURE — 63600175 PHARM REV CODE 636 W HCPCS: Performed by: NURSE ANESTHETIST, CERTIFIED REGISTERED

## 2022-03-09 PROCEDURE — D9220A PRA ANESTHESIA: Mod: ANES,ICN,, | Performed by: ANESTHESIOLOGY

## 2022-03-09 PROCEDURE — 52234 PR CYSTOURETHROSCOPY,FULGUR 0.5-2 CM LESN: ICD-10-PCS | Mod: ,,, | Performed by: UROLOGY

## 2022-03-09 PROCEDURE — 36000707: Performed by: UROLOGY

## 2022-03-09 PROCEDURE — 27000655: Performed by: ANESTHESIOLOGY

## 2022-03-09 PROCEDURE — 63600175 PHARM REV CODE 636 W HCPCS: Performed by: UROLOGY

## 2022-03-09 PROCEDURE — 71000015 HC POSTOP RECOV 1ST HR: Performed by: UROLOGY

## 2022-03-09 PROCEDURE — 37000008 HC ANESTHESIA 1ST 15 MINUTES: Performed by: UROLOGY

## 2022-03-09 PROCEDURE — 27201423 OPTIME MED/SURG SUP & DEVICES STERILE SUPPLY: Performed by: UROLOGY

## 2022-03-09 PROCEDURE — 27200155 *HC SET PRIMARY NONVENTED: Performed by: ANESTHESIOLOGY

## 2022-03-09 PROCEDURE — 63600175 PHARM REV CODE 636 W HCPCS: Performed by: ANESTHESIOLOGY

## 2022-03-09 PROCEDURE — 88305 SURGICAL PATHOLOGY: ICD-10-PCS | Mod: 26,,, | Performed by: PATHOLOGY

## 2022-03-09 RX ORDER — EPHEDRINE SULFATE 50 MG/ML
INJECTION, SOLUTION INTRAVENOUS
Status: DISCONTINUED | OUTPATIENT
Start: 2022-03-09 | End: 2022-03-09

## 2022-03-09 RX ORDER — KETOROLAC TROMETHAMINE 30 MG/ML
30 INJECTION, SOLUTION INTRAMUSCULAR; INTRAVENOUS ONCE
Status: COMPLETED | OUTPATIENT
Start: 2022-03-09 | End: 2022-03-09

## 2022-03-09 RX ORDER — HYDROCODONE BITARTRATE AND ACETAMINOPHEN 5; 325 MG/1; MG/1
1 TABLET ORAL EVERY 4 HOURS PRN
Status: DISCONTINUED | OUTPATIENT
Start: 2022-03-09 | End: 2022-03-09 | Stop reason: HOSPADM

## 2022-03-09 RX ORDER — DIPHENHYDRAMINE HYDROCHLORIDE 50 MG/ML
25 INJECTION INTRAMUSCULAR; INTRAVENOUS EVERY 6 HOURS PRN
Status: DISCONTINUED | OUTPATIENT
Start: 2022-03-09 | End: 2022-03-09 | Stop reason: HOSPADM

## 2022-03-09 RX ORDER — SODIUM CHLORIDE, SODIUM LACTATE, POTASSIUM CHLORIDE, CALCIUM CHLORIDE 600; 310; 30; 20 MG/100ML; MG/100ML; MG/100ML; MG/100ML
INJECTION, SOLUTION INTRAVENOUS CONTINUOUS
Status: DISCONTINUED | OUTPATIENT
Start: 2022-03-09 | End: 2022-03-09 | Stop reason: HOSPADM

## 2022-03-09 RX ORDER — FENTANYL CITRATE 50 UG/ML
INJECTION, SOLUTION INTRAMUSCULAR; INTRAVENOUS
Status: DISCONTINUED | OUTPATIENT
Start: 2022-03-09 | End: 2022-03-09

## 2022-03-09 RX ORDER — ONDANSETRON 2 MG/ML
4 INJECTION INTRAMUSCULAR; INTRAVENOUS DAILY PRN
Status: DISCONTINUED | OUTPATIENT
Start: 2022-03-09 | End: 2022-03-09 | Stop reason: HOSPADM

## 2022-03-09 RX ORDER — CIPROFLOXACIN 2 MG/ML
400 INJECTION, SOLUTION INTRAVENOUS ONCE
Status: COMPLETED | OUTPATIENT
Start: 2022-03-09 | End: 2022-03-09

## 2022-03-09 RX ORDER — HYDROMORPHONE HYDROCHLORIDE 2 MG/ML
0.5 INJECTION, SOLUTION INTRAMUSCULAR; INTRAVENOUS; SUBCUTANEOUS EVERY 5 MIN PRN
Status: COMPLETED | OUTPATIENT
Start: 2022-03-09 | End: 2022-03-09

## 2022-03-09 RX ORDER — MEPERIDINE HYDROCHLORIDE 25 MG/ML
25 INJECTION INTRAMUSCULAR; INTRAVENOUS; SUBCUTANEOUS EVERY 10 MIN PRN
Status: DISCONTINUED | OUTPATIENT
Start: 2022-03-09 | End: 2022-03-09 | Stop reason: HOSPADM

## 2022-03-09 RX ORDER — DEXAMETHASONE SODIUM PHOSPHATE 4 MG/ML
INJECTION, SOLUTION INTRA-ARTICULAR; INTRALESIONAL; INTRAMUSCULAR; INTRAVENOUS; SOFT TISSUE
Status: DISCONTINUED | OUTPATIENT
Start: 2022-03-09 | End: 2022-03-09

## 2022-03-09 RX ORDER — OXYCODONE HYDROCHLORIDE 5 MG/1
5 TABLET ORAL
Status: DISCONTINUED | OUTPATIENT
Start: 2022-03-09 | End: 2022-03-09 | Stop reason: HOSPADM

## 2022-03-09 RX ORDER — ACETAMINOPHEN 10 MG/ML
1000 INJECTION, SOLUTION INTRAVENOUS ONCE
Status: COMPLETED | OUTPATIENT
Start: 2022-03-09 | End: 2022-03-09

## 2022-03-09 RX ORDER — LIDOCAINE HYDROCHLORIDE 20 MG/ML
INJECTION, SOLUTION EPIDURAL; INFILTRATION; INTRACAUDAL; PERINEURAL
Status: DISCONTINUED | OUTPATIENT
Start: 2022-03-09 | End: 2022-03-09

## 2022-03-09 RX ORDER — ATROPA BELLADONNA AND OPIUM 16.2; 6 MG/1; MG/1
60 SUPPOSITORY RECTAL ONCE
Status: DISCONTINUED | OUTPATIENT
Start: 2022-03-09 | End: 2022-03-09 | Stop reason: HOSPADM

## 2022-03-09 RX ORDER — ONDANSETRON 2 MG/ML
INJECTION INTRAMUSCULAR; INTRAVENOUS
Status: DISCONTINUED | OUTPATIENT
Start: 2022-03-09 | End: 2022-03-09

## 2022-03-09 RX ORDER — GENTAMICIN SULFATE 80 MG/100ML
INJECTION, SOLUTION INTRAVENOUS
Status: DISCONTINUED | OUTPATIENT
Start: 2022-03-09 | End: 2022-03-09

## 2022-03-09 RX ORDER — PROPOFOL 10 MG/ML
VIAL (ML) INTRAVENOUS
Status: DISCONTINUED | OUTPATIENT
Start: 2022-03-09 | End: 2022-03-09

## 2022-03-09 RX ORDER — MORPHINE SULFATE 10 MG/ML
4 INJECTION INTRAMUSCULAR; INTRAVENOUS; SUBCUTANEOUS EVERY 5 MIN PRN
Status: DISCONTINUED | OUTPATIENT
Start: 2022-03-09 | End: 2022-03-09 | Stop reason: HOSPADM

## 2022-03-09 RX ORDER — ONDANSETRON 2 MG/ML
4 INJECTION INTRAMUSCULAR; INTRAVENOUS EVERY 12 HOURS PRN
Status: DISCONTINUED | OUTPATIENT
Start: 2022-03-09 | End: 2022-03-09 | Stop reason: HOSPADM

## 2022-03-09 RX ADMIN — LIDOCAINE HYDROCHLORIDE 80 MG: 20 INJECTION, SOLUTION INTRAVENOUS at 07:03

## 2022-03-09 RX ADMIN — MORPHINE SULFATE 4 MG: 10 INJECTION INTRAVENOUS at 09:03

## 2022-03-09 RX ADMIN — KETOROLAC TROMETHAMINE 30 MG: 30 INJECTION, SOLUTION INTRAMUSCULAR at 09:03

## 2022-03-09 RX ADMIN — HYDROMORPHONE HYDROCHLORIDE 0.5 MG: 2 INJECTION INTRAMUSCULAR; INTRAVENOUS; SUBCUTANEOUS at 09:03

## 2022-03-09 RX ADMIN — FENTANYL CITRATE 25 MCG: 50 INJECTION INTRAMUSCULAR; INTRAVENOUS at 08:03

## 2022-03-09 RX ADMIN — FENTANYL CITRATE 50 MCG: 50 INJECTION INTRAMUSCULAR; INTRAVENOUS at 07:03

## 2022-03-09 RX ADMIN — ACETAMINOPHEN 1000 MG: 10 INJECTION, SOLUTION INTRAVENOUS at 10:03

## 2022-03-09 RX ADMIN — SODIUM CHLORIDE, POTASSIUM CHLORIDE, SODIUM LACTATE AND CALCIUM CHLORIDE: 600; 310; 30; 20 INJECTION, SOLUTION INTRAVENOUS at 07:03

## 2022-03-09 RX ADMIN — EPHEDRINE SULFATE 15 MG: 50 INJECTION INTRAVENOUS at 07:03

## 2022-03-09 RX ADMIN — ONDANSETRON 4 MG: 2 INJECTION INTRAMUSCULAR; INTRAVENOUS at 07:03

## 2022-03-09 RX ADMIN — EPHEDRINE SULFATE 10 MG: 50 INJECTION INTRAVENOUS at 07:03

## 2022-03-09 RX ADMIN — PROPOFOL 120 MG: 10 INJECTION, EMULSION INTRAVENOUS at 07:03

## 2022-03-09 RX ADMIN — DEXAMETHASONE SODIUM PHOSPHATE 4 MG: 4 INJECTION, SOLUTION INTRA-ARTICULAR; INTRALESIONAL; INTRAMUSCULAR; INTRAVENOUS; SOFT TISSUE at 07:03

## 2022-03-09 RX ADMIN — MORPHINE SULFATE 2 MG: 10 INJECTION INTRAVENOUS at 09:03

## 2022-03-09 RX ADMIN — CIPROFLOXACIN 400 MG: 2 INJECTION, SOLUTION INTRAVENOUS at 07:03

## 2022-03-09 RX ADMIN — GENTAMICIN SULFATE 80 MG: 80 INJECTION, SOLUTION INTRAVENOUS at 07:03

## 2022-03-09 NOTE — ANESTHESIA PROCEDURE NOTES
Intubation    Date/Time: 3/9/2022 7:37 AM  Performed by: Babita Dorantes CRNA  Authorized by: Constantine Sylvester MD     Intubation:     Induction:  Intravenous    Intubated:  Postinduction    Mask Ventilation:  Easy mask    Attempts:  1    Attempted By:  CRNA    Method of Intubation:  Other (see comments)    Difficult Airway Encountered?: No      Complications:  None    Airway Device:  Supraglottic airway/LMA    Airway Device Size:  4.0    Style/Cuff Inflation:  Cuffed (inflated to minimal occlusive pressure)    Inflation Amount (mL):  10    Placement Verified By:  Capnometry    Complicating Factors:  None    Findings Post-Intubation:  BS equal bilateral and atraumatic/condition of teeth unchanged

## 2022-03-09 NOTE — OP NOTE
[facility]  General Surgery  Operative Note    SUMMARY     Date of Procedure: 3/9/2022     Procedure: Procedure(s) (LRB):  TURBT (TRANSURETHRAL RESECTION OF BLADDER TUMOR) (N/A)       Surgeon(s) and Role:     * Terrence Licea Jr., MD - Primary    Assisting Surgeon: None    Pre-Operative Diagnosis: Malignant neoplasm of urinary bladder, unspecified site [C67.9] with extensive calcifications over tumor bed    Post-Operative Diagnosis: Post-Op Diagnosis Codes:     * Malignant neoplasm of urinary bladder, unspecified site [C67.9] with extensive calcifications over tumor bed    Anesthesia: General LMA    Operative Findings (including complications, if any):  Extensive bladder calcifications    Description of Technical Procedures:  The patient was taken to the hospital cystoscopy suite.  Satisfactory general LMA was introduced.  The patient was placed in the dorsal lithotomy position and prepared for cystoscopic procedures.  The urethra calibrated with Johnathon bougies and there was slight hang up on 24 Kazakh bougie in the area of the bulbous urethra.  The 23.5 Kazakh Storz rigid cystoscope passed under direct vision with the Olympus camera system.  Anterior urethra was basically clear.  Posterior urethra was short and and bladder neck high sitting.  Extensive calcifications were noted on the left lateral wall primarily above there the left ureteral orifice.  I never really saw either ureteral orifice for sure.  It was felt that calcified areas were above the orifice.  Extensive calcifications were noted over the area of the previous tumors and a lot of  the calcification was dislodged with the beak of the scope.  The cold cup biopsy forceps were used to remove some of the rest of the calcification in the calcified area also seemed to extend into the wall of the bladder with some of the actual bladder wall being removed with the calcifications.  This was submitted as specimen 1 which was labeled as  calcified tissue from tumor bed.  The calcification went well into the wall.  I did not see obvious tumor but clearly there was abnormal tissue present.    Tissue adjacent to the tumor beds was removed with biopsy forceps along with some of the calcified tissue and submitted as specimen 2. I fulgurated the areas around the tumor bed destroying the adjacent tissue that had a slight papillary appearance.  This took care of all bleeding.  We concluded the procedure and fulgurated  small calcified area on the posterior wall just behind interureteric ridge.  It appeared all bleeding was controlled.  The scope was removed.  A 22 [place]an silicone coated Coronel was left indwelling with 10 cc water in balloon.  Drainage bag attached patient sent to the recovery room.  Blood loss was felt to be less than 10 mL.      Significant Surgical Tasks Conducted by the Assistant(s), if Applicable:     Estimated Blood Loss (EBL): * No values recorded between 3/9/2022  7:45 AM and 3/9/2022  9:09 AM *           Implants: * No implants in log *    Specimens:   Specimen (24h ago, onward)             Start     Ordered    03/09/22 0841  Surgical Pathology  RELEASE UPON ORDERING         03/09/22 0841                        Condition: Stable    Disposition: PACU - hemodynamically stable.    Attestation: I was present and scrubbed for the entire procedure.

## 2022-03-09 NOTE — TRANSFER OF CARE
"Anesthesia Transfer of Care Note    Patient: Ruben Figueroa    Procedure(s) Performed: Procedure(s) (LRB):  TURBT (TRANSURETHRAL RESECTION OF BLADDER TUMOR) (N/A)    Patient location: PACU    Anesthesia Type: general    Transport from OR: Transported from OR on room air with adequate spontaneous ventilation    Post pain: adequate analgesia    Post assessment: no apparent anesthetic complications    Post vital signs: stable    Level of consciousness: awake and alert    Nausea/Vomiting: no nausea/vomiting    Complications: none    Transfer of care protocol was followed      Last vitals:   Visit Vitals  BP (!) 182/90   Pulse 89   Temp 36.8 °C (98.2 °F) (Oral)   Resp 12   Ht 5' 10" (1.778 m)   Wt 78.5 kg (173 lb)   SpO2 97%   BMI 24.82 kg/m²     "

## 2022-03-09 NOTE — ANESTHESIA PREPROCEDURE EVALUATION
03/09/2022  Ruben Figueroa is a 76 y.o., male.      Pre-op Assessment    I have reviewed the Patient Summary Reports.    I have reviewed the NPO Status.   I have reviewed the Medications.     Review of Systems  Social:  Non-Smoker, No Alcohol Use    Hematology/Oncology:  Hematology Normal   Oncology Normal     EENT/Dental:EENT/Dental Normal   Cardiovascular:   Hypertension    Pulmonary:  Pulmonary Normal    Renal/:   BPH    Hepatic/GI:  Hepatic/GI Normal    Musculoskeletal:  Musculoskeletal Normal    Neurological:  Neurology Normal    Endocrine:  Endocrine Normal    Dermatological:  Skin Normal    Psych:  Psychiatric Normal           Physical Exam  General: Well nourished, Cooperative, Alert and Oriented    Airway:  Mallampati: II / II  Mouth Opening: Normal  TM Distance: Normal  Neck ROM: Normal ROM    Dental:  Intact, Caps / Implants    Chest/Lungs:  Clear to auscultation    Heart:  Rate: Normal  Rhythm: Regular Rhythm  Sounds: Normal        Chemistry        Component Value Date/Time     03/07/2022 1242    K 4.0 03/07/2022 1242     (H) 03/07/2022 1242    CO2 28 03/07/2022 1242    BUN 11 03/07/2022 1242    CREATININE 1.05 03/07/2022 1242     (H) 03/07/2022 1242        Component Value Date/Time    CALCIUM 8.9 03/07/2022 1242    ALKPHOS 79 08/27/2021 0819    AST 18 08/27/2021 0819    ALT 21 08/27/2021 0819    BILITOT 0.9 08/27/2021 0819    ESTGFRAFRICA 86 08/19/2020 0710    EGFRNONAA 73 03/07/2022 1242        Lab Results   Component Value Date    WBC 8.29 03/07/2022    RBC 4.74 03/07/2022    HGB 14.6 03/07/2022    MCV 95.6 03/07/2022    MCH 30.8 03/07/2022    MCHC 32.2 03/07/2022    RDW 13.3 03/07/2022     03/07/2022    MPV 10.3 03/07/2022    LYMPH 27.0 03/07/2022    LYMPH 2.24 03/07/2022    MONO 4.3 03/07/2022    EOS 0.22 03/07/2022    BASO 0.06 03/07/2022       Chemistry         Component Value Date/Time     03/07/2022 1242    K 4.0 03/07/2022 1242     (H) 03/07/2022 1242    CO2 28 03/07/2022 1242    BUN 11 03/07/2022 1242    CREATININE 1.05 03/07/2022 1242     (H) 03/07/2022 1242        Component Value Date/Time    CALCIUM 8.9 03/07/2022 1242    ALKPHOS 79 08/27/2021 0819    AST 18 08/27/2021 0819    ALT 21 08/27/2021 0819    BILITOT 0.9 08/27/2021 0819    ESTGFRAFRICA 86 08/19/2020 0710    EGFRNONAA 73 03/07/2022 1242        Lab Results   Component Value Date    WBC 8.29 03/07/2022    RBC 4.74 03/07/2022    HGB 14.6 03/07/2022    MCV 95.6 03/07/2022    MCH 30.8 03/07/2022    MCHC 32.2 03/07/2022    RDW 13.3 03/07/2022     03/07/2022    MPV 10.3 03/07/2022    LYMPH 27.0 03/07/2022    LYMPH 2.24 03/07/2022    MONO 4.3 03/07/2022    EOS 0.22 03/07/2022    BASO 0.06 03/07/2022     Results for orders placed or performed in visit on 03/07/22   EKG 12-lead    Collection Time: 03/07/22  1:19 PM    Narrative    Test Reason : Z01.810,    Vent. Rate : 052 BPM     Atrial Rate : 052 BPM     P-R Int : 146 ms          QRS Dur : 088 ms      QT Int : 420 ms       P-R-T Axes : 072 058 033 degrees     QTc Int : 390 ms    Sinus bradycardia  Nonspecific T wave abnormality  Abnormal ECG  No previous ECGs available  Confirmed by Sharon Moore MD ([id number]) on 3/8/2022 5:22:45 PM    Referred By: TORSTEN SNIDER JR           Confirmed By:Sahron Moore MD         Anesthesia Plan  Type of Anesthesia, risks & benefits discussed:    Anesthesia Type: Gen ETT  Intra-op Monitoring Plan: Standard ASA Monitors  Post Op Pain Control Plan: multimodal analgesia  Induction:  IV  Airway Plan: Direct  Informed Consent: Informed consent signed with the Patient and all parties understand the risks and agree with anesthesia plan.  All questions answered.   ASA Score: 3  Day of Surgery Review of History & Physical: H&P Update referred to the surgeon/provider.    Ready For Surgery From Anesthesia  Perspective.     .

## 2022-03-09 NOTE — PROGRESS NOTES
Report given to michael sloan. care released. States he feels a lot better. Pain 4 of 10 now.98%, 74, 16, 171/86

## 2022-03-10 ENCOUNTER — TELEPHONE (OUTPATIENT)
Dept: UROLOGY | Facility: CLINIC | Age: 77
End: 2022-03-10
Payer: MEDICARE

## 2022-03-10 LAB
DHEA SERPL-MCNC: NORMAL
ESTROGEN SERPL-MCNC: NORMAL PG/ML
INSULIN SERPL-ACNC: NORMAL U[IU]/ML
LAB AP GROSS DESCRIPTION: NORMAL
LAB AP LABORATORY NOTES: NORMAL
T3RU NFR SERPL: NORMAL %
UA COMPLETE W REFLEX CULTURE PNL UR: ABNORMAL

## 2022-03-10 RX ORDER — SULFAMETHOXAZOLE AND TRIMETHOPRIM 800; 160 MG/1; MG/1
1 TABLET ORAL 2 TIMES DAILY
Qty: 20 TABLET | Refills: 0 | Status: SHIPPED | OUTPATIENT
Start: 2022-03-10 | End: 2022-12-28

## 2022-03-10 NOTE — ANESTHESIA POSTPROCEDURE EVALUATION
Anesthesia Post Evaluation    Patient: Ruben Figueroa    Procedure(s) Performed: Procedure(s) (LRB):  TURBT (TRANSURETHRAL RESECTION OF BLADDER TUMOR) (N/A)    Final Anesthesia Type: general      Patient location during evaluation: PACU  Patient participation: Yes- Able to Participate  Level of consciousness: awake and alert  Post-procedure vital signs: reviewed and stable  Pain management: adequate  Airway patency: patent  CECILIO mitigation strategies: Multimodal analgesia  PONV status at discharge: No PONV  Anesthetic complications: no      Cardiovascular status: blood pressure returned to baseline  Respiratory status: unassisted  Hydration status: euvolemic  Follow-up not needed.          Vitals Value Taken Time   /80 03/09/22 1315   Temp 36.8 °C (98.2 °F) 03/09/22 0913   Pulse 81 03/09/22 1330   Resp 18 03/09/22 1330   SpO2 97 % 03/09/22 1330         Event Time   Out of Recovery 10:10:00         Pain/Matias Score: Pain Rating Prior to Med Admin: 7 (3/9/2022 10:00 AM)  Pain Rating Post Med Admin: 4 (3/9/2022 10:10 AM)  Matias Score: 9 (3/9/2022 10:10 AM)

## 2022-03-11 DIAGNOSIS — Z71.89 COMPLEX CARE COORDINATION: ICD-10-CM

## 2022-03-11 NOTE — TELEPHONE ENCOUNTER
Telephone call to patient.  Notified that pathology report is favorable with just a lot a calcified changes in the mucosa.  No evidence of malignancy.  Patient doing very well clinically.  No problems after catheter removed.  Also notified of positive urine culture which grew over 100,000 colonies of Staph epidermidis.  Patient will be treated with 10 days of Bactrim DS.  One month appointment or sooner if needed.

## 2022-03-14 NOTE — DISCHARGE SUMMARY
Patient ID: Ruben Figueroa is a 76 y.o. male.     Chief Complaint: Pre-op Exam (CBC, BMP, COVID, EKG)    Follow up in 2 months (on 2/14/2022) for Pre op workup; CBC, BMP lab and EKG prior to visit.    Patient seems to have more problem with abnormal calcifications of the bladder than from primary bladder tumor although I cannot rule out underlying recurrence of TCC.    Patient's bladder needs to have a calcification removed and underlying tissue biopsied to make sure there is not recurrent TCC.  That will have to be done under anesthesia because of the pain involved.  Mr. Figueroa wants to wait a couple months before doing that and that is probably reasonable.    We will plan on surgery Wednesday February 16th. Return for workup on Monday February 14.    Patient follow-up blood pressure 158/76. Apparently blood pressure has been running a little high lately. He was given samples of hydrochlorothiazide 25 mg.  He needs to continue to monitor blood pressure to see if addition of HCTZ helpful. Probably needs to be monitored by Dr. Gonzalez. (12/15/2021)     The note above is from the December visit for cystoscopy.  Patient still has extensive calcifications overlying the tumor sites and needs to have biopsies of the tissue as well as removal of the calcified tissue.  Bladder is thin where he has had previous tumors removed.  Patient postponed the procedure in extra month because of family problems.  He is doing reasonably well.  Voiding satisfactorily with aid of tamsulosin.  He is ready to proceed with the procedure for removal a calcification and bladder biopsies.  Surgery scheduled for Wednesday.     Assessment:  Transitional cell carcinoma of bladder - primary  Urinary tract infection with hematuria    Plan:  Lab data satisfactory.  Urinalysis suggested infection.  Patient given Cipro 500 mg b.i.d. for 3 days while awaiting culture results.    He will be admitted to outpatient surgery Wednesday March 9 at 5:30 a.m. and  be NPO other than morning medications.    Instructions for preadmission given by Ms. Jim and understood by Mr. Figueroa.  Informed consent obtained.   (March 7, 2022)  -----------------------------------------------------------------------------------------------  The above note is from Dr. Licea's last 2 clinic visits with Mr. Figueroa, with the last visit on March 7, 2022.    Mr. Figueroa was admitted to outpatient surgery department of [facility] the morning of Wednesday, March 9, 2022. He was taken to the Cystoscopy suite where he underwent Cystoscopy with TUR-BT under general LMA anesthesia by Dr. Licea (please see his operative procedure note for complete details). He tolerated the procedure well and was awakened and taken to PACU in stable condition. After PACU, he was returned to ASC room # 10.  He had an uneventful recovery. A 20 French Coronle catheter was left indwelling which he will remove at home in the morning. Urine in the leg bag is clear and yellow in color. He is feeling better now and is requesting to go home.    Mr. Figueroa is on a pain contract with [facility] here in [place]. A  was performed on Mr. Figueroa and his last narcotic prescription was written on December 14, 2021, but not filled until February 27, 2022 for Norco (Hydrocodone) 7.5/325 mg tabs # 60 with no refill by ANAHY Cordero from the [facility].  Mayur Seth's office was called and I spoke to her .  She said she would inform Mrs. Seth of the prescription Dr. Licea wrote for Mr. Figueroa.     Dr. Licea wrote Mr. Figueroa the following prescription:    Prescription:  Percocet (Oxycodone) 5/325 mg, # 16 with no refill.  Patient may take 1 tablet by mouth every 4 hours as needed for pain.    BRIDGER Moody, JAJA,  [facility]  -----------------------------------------------------------------------------------------------  Below is the Pathology report on Mr. Figueroa:    [facility]  [address] [place] [address] [place] [id number]  Ruben Figueroa [id number]  M, 76 yrs, 1945    Surgical Pathology (Final result) [id number]  Authorizing Provider: Terrence Licea Jr., MD  Ordering Provider: Terrence Licea Jr., MD  Ordering Location: [facility]  Collected: 03/09/2022 08:09 AM  Pathologist: Morgan Campos MD  Received: 03/09/2022 10:06 AM    Final Diagnosis  A. Urinary bladder lesion, biopsies:  - Markedly inflamed and partially calcified bladder wall stroma  - No overt malignancy identified  - See comments    B. Urinary bladder tumor bed, biopsies:  - Markedly inflamed and partially calcified bladder wall stroma  - Numerous separately received calcifications  - See comments    Comments:  The non calcified tissue in both specimens is markedly inflamed. There is some granulomatous inflammation seen within specimen (A). No intact urothelium seen. Dr. Benson has reviewed this case, and he agrees with the findings.    Gross Description:  A. Bladder.  The specimen is received in formalin designated bladder- calcified residue of previous TCC. See previous specimen and consists of multiple pink-red soft fragments of tissue measuring in aggregate 1.7 x 1.3 x 0.3 cm. The specimen is entirely submitted in cassette A1. Additionally within the container are multiple tan brown fragments of calcified material measuring in aggregate 1.2 x 0.7 x 0.3 cm. No blocks are submitted.    Grossing was completed by Lewis Robert.    B. Bladder.  The specimen is received in formalin designated bladder- tissue biopsies of calcified tumor bed and consists of multiple tan-brown fragments of calcified material measuring in aggregate 3.0 x 2.7 x 1.3 cm. Among the calcified material is a white to pink-red tissue fragment measuring 0.5 cm in greatest dimension and soft white-tan to tanbrown shaggy fragments of presumed tissue measuring in aggregate 2.4 x 1.6 x 0.4 cm. The  tissue fragments are entirely submitted in cassettes B1 and B2. The remaining calcified material will be soaked in decalcifier overnight.    Grossing was completed by Lewis Robert.    Microscopic Description:  A microscopic examination was performed and the diagnosis reflects the findings.    Electronically signed by Morgan Campos MD on 3/10/2022 at 1:31 PM

## 2022-04-04 DIAGNOSIS — F32.A DEPRESSION, UNSPECIFIED DEPRESSION TYPE: Primary | ICD-10-CM

## 2022-04-04 RX ORDER — TRAZODONE HYDROCHLORIDE 100 MG/1
100 TABLET ORAL NIGHTLY
Qty: 30 TABLET | Refills: 0 | Status: SHIPPED | OUTPATIENT
Start: 2022-04-04 | End: 2022-05-02 | Stop reason: SDUPTHER

## 2022-04-15 ENCOUNTER — OFFICE VISIT (OUTPATIENT)
Dept: DERMATOLOGY | Facility: CLINIC | Age: 77
End: 2022-04-15
Payer: MEDICARE

## 2022-04-15 VITALS — BODY MASS INDEX: 24.78 KG/M2 | RESPIRATION RATE: 18 BRPM | HEIGHT: 70 IN | WEIGHT: 173.06 LBS

## 2022-04-15 DIAGNOSIS — L57.0 ACTINIC KERATOSES: Primary | ICD-10-CM

## 2022-04-15 DIAGNOSIS — L82.1 SEBORRHEIC KERATOSES: ICD-10-CM

## 2022-04-15 DIAGNOSIS — Z85.828 HISTORY OF NONMELANOMA SKIN CANCER: ICD-10-CM

## 2022-04-15 DIAGNOSIS — D18.01 CHERRY ANGIOMA: ICD-10-CM

## 2022-04-15 PROCEDURE — 99213 PR OFFICE/OUTPT VISIT, EST, LEVL III, 20-29 MIN: ICD-10-PCS | Mod: 25,,, | Performed by: STUDENT IN AN ORGANIZED HEALTH CARE EDUCATION/TRAINING PROGRAM

## 2022-04-15 PROCEDURE — 99213 OFFICE O/P EST LOW 20 MIN: CPT | Mod: 25,,, | Performed by: STUDENT IN AN ORGANIZED HEALTH CARE EDUCATION/TRAINING PROGRAM

## 2022-04-15 PROCEDURE — 17003 DESTRUCTION, PREMALIGNANT LESIONS; SECOND THROUGH 14 LESIONS: ICD-10-PCS | Mod: ,,, | Performed by: STUDENT IN AN ORGANIZED HEALTH CARE EDUCATION/TRAINING PROGRAM

## 2022-04-15 PROCEDURE — 17000 PR DESTRUCTION(LASER SURGERY,CRYOSURGERY,CHEMOSURGERY),PREMALIGNANT LESIONS,FIRST LESION: ICD-10-PCS | Mod: ,,, | Performed by: STUDENT IN AN ORGANIZED HEALTH CARE EDUCATION/TRAINING PROGRAM

## 2022-04-15 PROCEDURE — 17000 DESTRUCT PREMALG LESION: CPT | Mod: ,,, | Performed by: STUDENT IN AN ORGANIZED HEALTH CARE EDUCATION/TRAINING PROGRAM

## 2022-04-15 PROCEDURE — 17003 DESTRUCT PREMALG LES 2-14: CPT | Mod: ,,, | Performed by: STUDENT IN AN ORGANIZED HEALTH CARE EDUCATION/TRAINING PROGRAM

## 2022-04-15 NOTE — PROGRESS NOTES
[facility]  Scotty Kim MD    [address] [place] [address]  [phone]    Fax: [phone]    Patient Name: Ruben Figueroa  Medical Record Number: [id number]  PCP: Jon Gonzalez MD  Age: 76 y.o. : 1945  Contact: [phone] (home)     History of Present Illness:     Ruben Figueroa is a 76 y.o.  male here for follow up of NMSC (BCC of R cheek and L leg treated with Mohs surgery ). Today, he has several scaly areas on his face and ears.      The patient has no other concerns today.    Review of Systems:     Unremarkable other than mentioned above.     Physical Exam:     General: Relaxed, oriented, alert    Skin examination of the scalp, face, neck, chest, back, abdomen, upper extremities and lower extremities were normal except for as listed below      Assessment and Plan:     1. History of NMSC   Well-healed scar on left leg and R cheek.   No e/o recurrence   Recommend sunscreen and good photoprotection       2. Actinic Keratoses  Erythematous, scaly papules on right ear, face, left ear, left hand, right arm    Plan: Liquid Nitrogen.  A total of 9 lesions were treated with liquid nitrogen for 2 freeze-thaw cycles lasting 5 seconds, located on the above locations.   The patient's consent was obtained including but not limited to risks of crusting, scabbing,  blistering, scarring, darker or lighter pigmentary change, recurrence, incomplete removal and infection.    Counseling.  Sun protective clothing and broad spectrum sunscreen can prevent the formation of AK.   AKs can be treated with cryotherapy, photodynamic therapy, imiquimod, topical 5-FU.  Actinic Keratoses are precancerous proliferations that occur within sun damaged skin. If untreated,  a small subset of AKs can develop into Squamous Cell Carcinoma.    3. Seborrheic keratoses   - brown stuck on appearing papules/plaques  - patient educated on benign nature. No treatment necessary unless they  become irritated or inflamed       4. Cherry Angiomas  - Scattered bright pink papules    Plan: Counseling  I counseled the patient regarding the diagnosis including that cherry angiomas are benign skin growths requiring no treatment. Patients get more as they age.        Return to clinic in 6 months.    AVS printed with patient instructions     Scotty Kim MD   Mohs Surgery/Dermatologic Oncology  Dermatology

## 2022-05-02 DIAGNOSIS — F32.A DEPRESSION, UNSPECIFIED DEPRESSION TYPE: ICD-10-CM

## 2022-05-02 RX ORDER — TRAZODONE HYDROCHLORIDE 100 MG/1
100 TABLET ORAL NIGHTLY
Qty: 30 TABLET | Refills: 11 | Status: SHIPPED | OUTPATIENT
Start: 2022-05-02 | End: 2022-12-28 | Stop reason: SDUPTHER

## 2022-05-03 ENCOUNTER — OFFICE VISIT (OUTPATIENT)
Dept: UROLOGY | Facility: CLINIC | Age: 77
End: 2022-05-03
Payer: MEDICARE

## 2022-05-03 VITALS
HEART RATE: 56 BPM | DIASTOLIC BLOOD PRESSURE: 83 MMHG | SYSTOLIC BLOOD PRESSURE: 173 MMHG | HEIGHT: 70 IN | WEIGHT: 168 LBS | BODY MASS INDEX: 24.05 KG/M2

## 2022-05-03 DIAGNOSIS — R31.9 URINARY TRACT INFECTION WITH HEMATURIA, SITE UNSPECIFIED: ICD-10-CM

## 2022-05-03 DIAGNOSIS — N32.89: ICD-10-CM

## 2022-05-03 DIAGNOSIS — C67.9 PRIMARY MALIGNANT NEOPLASM OF BLADDER: ICD-10-CM

## 2022-05-03 DIAGNOSIS — N39.0 URINARY TRACT INFECTION WITH HEMATURIA, SITE UNSPECIFIED: ICD-10-CM

## 2022-05-03 DIAGNOSIS — Z09 POSTOP CHECK: Primary | ICD-10-CM

## 2022-05-03 PROCEDURE — 99024 POSTOP FOLLOW-UP VISIT: CPT | Mod: ,,, | Performed by: UROLOGY

## 2022-05-03 PROCEDURE — 99214 OFFICE O/P EST MOD 30 MIN: CPT | Mod: PBBFAC | Performed by: UROLOGY

## 2022-05-03 PROCEDURE — 87086 CULTURE, URINE: ICD-10-PCS | Mod: ,,, | Performed by: CLINICAL MEDICAL LABORATORY

## 2022-05-03 PROCEDURE — 87086 URINE CULTURE/COLONY COUNT: CPT | Mod: ,,, | Performed by: CLINICAL MEDICAL LABORATORY

## 2022-05-03 PROCEDURE — 99024 PR POST-OP FOLLOW-UP VISIT: ICD-10-PCS | Mod: ,,, | Performed by: UROLOGY

## 2022-05-03 NOTE — PATIENT INSTRUCTIONS
Urine sent for culture to make sure there is no infection.    He will return for cystoscopy on Wednesday September 7th at 4:00 p.m.  Call for problems prior to that time

## 2022-05-03 NOTE — PROGRESS NOTES
Subjective:       Patient ID: Ruben Figueroa is a 76 y.o. male.    Chief Complaint: Follow-up (1 month F/U)       Follow up in 2 months (on 2/14/2022) for Pre op workup; CBC, BMP lab and EKG prior to visit.  Patient seems to have more problem with abnormal calcifications of the bladder than from primary bladder tumor although I cannot rule out underlying recurrence of TCC.  Patient's bladder needs to have a calcification removed and underlying tissue biopsied to make sure there is not recurrent TCC.  That will have to be done under anesthesia because of the pain involved.  Mr. Figueroa wants to wait a couple months before doing that and that is probably reasonable.  We will plan on surgery Wednesday February 16th.  Return for workup on Monday February 14.  Patient follow-up blood pressure 158/76.  Apparently blood pressure has been running a little high lately.  He was given samples of hydrochlorothiazide 25 mg.  He needs to continue to monitor blood pressure to see if addition of HCTZ helpful.  Probably needs to be monitored by Dr. Gonzalez. (12/15/2021)     The note above is from the December visit for cystoscopy.  Patient still has extensive calcifications overlying the tumor sites and needs to have biopsies of the tissue as well as removal of the calcified tissue.  Bladder is thin where he has had previous tumors removed.  Patient postponed the procedure in extra month because of family problems.  He is doing reasonably well.  Voiding satisfactorily with aid of tamsulosin.  He is ready to proceed with the procedure for removal of calcification and bladder biopsies.  Surgery scheduled for Wednesday.  (March 7, 2022)     Mr. Figueroa is in for follow-up after having the calcifications of the bladder wall removed 2 months ago.  Bladder wall showed no evidence of urothelial cancer.  He has had minimal difficulty following the procedure.  He has had no recent visible blood in the urine.  Feeling well.  Nocturia 1-2 times  nightly. (May 3, 2022)    Review of Systems      Objective:      Physical Exam  Constitutional:       Appearance: Normal appearance. He is normal weight.   Neurological:      Mental Status: He is alert.   Psychiatric:         Mood and Affect: Mood normal.         Behavior: Behavior normal.       urinalysis revealed several pus cells and several red cells.  The dipstick had 2+ leukocytes with moderate amount of blood, pH 6.0 and specific gravity 1.020.  Assessment:       Problem List Items Addressed This Visit    None     Visit Diagnoses     Postop check    -  Primary    Urinary tract infection with hematuria, site unspecified        Relevant Orders    Urine culture    Primary malignant neoplasm of bladder        Relevant Orders    Cystoscopy    Bladder calcified              Plan:     Urine sent for culture to make sure there is no infection.    He will return for cystoscopy on Wednesday September 7th at 4:00 p.m.  Call for problems prior to that time  *

## 2022-05-05 LAB — UA COMPLETE W REFLEX CULTURE PNL UR: NO GROWTH

## 2022-06-28 ENCOUNTER — OFFICE VISIT (OUTPATIENT)
Dept: FAMILY MEDICINE | Facility: CLINIC | Age: 77
End: 2022-06-28
Payer: MEDICARE

## 2022-06-28 VITALS
WEIGHT: 168 LBS | RESPIRATION RATE: 18 BRPM | TEMPERATURE: 98 F | SYSTOLIC BLOOD PRESSURE: 152 MMHG | DIASTOLIC BLOOD PRESSURE: 80 MMHG | OXYGEN SATURATION: 98 % | BODY MASS INDEX: 24.05 KG/M2 | HEIGHT: 70 IN | HEART RATE: 55 BPM

## 2022-06-28 DIAGNOSIS — I10 ESSENTIAL HYPERTENSION: Primary | ICD-10-CM

## 2022-06-28 DIAGNOSIS — N40.0 BENIGN PROSTATIC HYPERPLASIA WITHOUT LOWER URINARY TRACT SYMPTOMS: ICD-10-CM

## 2022-06-28 DIAGNOSIS — E78.2 MIXED HYPERLIPIDEMIA: ICD-10-CM

## 2022-06-28 PROCEDURE — 99214 PR OFFICE/OUTPT VISIT, EST, LEVL IV, 30-39 MIN: ICD-10-PCS | Mod: ,,, | Performed by: FAMILY MEDICINE

## 2022-06-28 PROCEDURE — 99214 OFFICE O/P EST MOD 30 MIN: CPT | Mod: ,,, | Performed by: FAMILY MEDICINE

## 2022-06-28 RX ORDER — TAMSULOSIN HYDROCHLORIDE 0.4 MG/1
0.4 CAPSULE ORAL DAILY
Qty: 90 CAPSULE | Refills: 3 | Status: SHIPPED | OUTPATIENT
Start: 2022-06-28 | End: 2022-12-28 | Stop reason: SDUPTHER

## 2022-06-28 RX ORDER — LISINOPRIL AND HYDROCHLOROTHIAZIDE 12.5; 2 MG/1; MG/1
1 TABLET ORAL DAILY
Qty: 90 TABLET | Refills: 3 | Status: SHIPPED | OUTPATIENT
Start: 2022-06-28 | End: 2022-07-25

## 2022-06-28 RX ORDER — AMLODIPINE BESYLATE 5 MG/1
5 TABLET ORAL DAILY
Qty: 90 TABLET | Refills: 3 | Status: SHIPPED | OUTPATIENT
Start: 2022-06-28 | End: 2022-12-28 | Stop reason: SDUPTHER

## 2022-06-28 RX ORDER — SIMVASTATIN 10 MG/1
10 TABLET, FILM COATED ORAL NIGHTLY
COMMUNITY
End: 2022-12-28 | Stop reason: SDUPTHER

## 2022-06-28 NOTE — PROGRESS NOTES
Jon Gonzalez MD        PATIENT NAME: Ruben Figueroa  : 1945  DATE: 22  MRN: [id number]      Billing Provider: Jon Gonzalez MD  Level of Service: [place] OFFICE/OUTPT VISIT, EST, LEVL IV, 30-39 MIN  Patient PCP Information     Provider PCP Type    Jon Gonzalez MD General          Reason for Visit / Chief Complaint: Hypertension and Neck Pain       Update PCP  Update Chief Complaint         History of Present Illness / Problem Focused Workflow     Ruben Figueroa presents to the clinic with Hypertension and Neck Pain     Routine followup.  No significant interval change.  BP has been high.        Review of Systems     Review of Systems   Constitutional: Negative for activity change, appetite change, fever and unexpected weight change.   HENT: Negative for congestion, rhinorrhea, sinus pressure, sinus pain, sore throat and trouble swallowing.    Eyes: Negative for photophobia, pain, discharge and visual disturbance.   Respiratory: Negative for cough, chest tightness, wheezing and stridor.    Cardiovascular: Negative for chest pain, palpitations and leg swelling.   Gastrointestinal: Negative for abdominal pain, blood in stool, constipation, diarrhea and nausea.   Endocrine: Negative for polydipsia, polyphagia and polyuria.   Genitourinary: Negative for difficulty urinating, flank pain and hematuria.   Musculoskeletal: Negative for arthralgias and neck pain.   Skin: Negative for rash.   Allergic/Immunologic: Negative for food allergies.   Neurological: Negative for dizziness, tremors, seizures, syncope, weakness (global weakness) and headaches.   Psychiatric/Behavioral: Negative for behavioral problems, confusion, decreased concentration, dysphoric mood and hallucinations. The patient is not nervous/anxious.         Medical / Social / Family History     Past Medical History:   Diagnosis Date    BPH (benign prostatic hyperplasia)     Chronic prostatitis     Hyperlipidemia     Hypertension      Insomnia     Malignant neoplasm of bladder     Sciatica        Past Surgical History:   Procedure Laterality Date    BLADDER TUMOR EXCISION      skin cancer removed         Social History    reports that he quit smoking about 32 years ago. His smoking use included cigarettes. He started smoking about 62 years ago. He smoked 1.00 pack per day. He has never used smokeless tobacco. He reports current alcohol use. He reports that he does not use drugs.    Family History  's family history includes Bladder Cancer in his father; Heart disease in his father; Hypertension in his mother.    Medications and Allergies     Medications  Outpatient Medications Marked as Taking for the 6/28/22 encounter (Office Visit) with Jon Gonzalez MD   Medication Sig Dispense Refill    lisinopriL (PRINIVIL,ZESTRIL) 20 MG tablet Take 1 tablet (20 mg total) by mouth once daily. 30 tablet 3    simvastatin (ZOCOR) 10 MG tablet Take 10 mg by mouth every evening. Is taking 3 times a week      traZODone (DESYREL) 100 MG tablet Take 1 tablet (100 mg total) by mouth every evening. 30 tablet 11    [DISCONTINUED] tamsulosin (FLOMAX) 0.4 mg Cap Take 0.4 mg by mouth once daily.         Allergies  Review of patient's allergies indicates:   Allergen Reactions    Doxycycline hyclate     Penicillins        Physical Examination     Vitals:    06/28/22 0734   BP: (!) 152/80   Pulse: (!) 55   Resp: 18   Temp: 98 °F (36.7 °C)     Physical Exam  Constitutional:       General: He is not in acute distress.     Appearance: Normal appearance.   HENT:      Head: Normocephalic.      Right Ear: Tympanic membrane and ear canal normal.      Left Ear: Tympanic membrane and ear canal normal.      Nose: Nose normal.      Mouth/Throat:      Mouth: Mucous membranes are moist.      Pharynx: No oropharyngeal exudate.   Eyes:      Extraocular Movements: Extraocular movements intact.      Pupils: Pupils are equal, round, and reactive to light.   Cardiovascular:       Rate and Rhythm: Normal rate and regular rhythm.      Heart sounds: No murmur heard.  Pulmonary:      Effort: Pulmonary effort is normal.      Breath sounds: Normal breath sounds. No wheezing.   Abdominal:      General: Abdomen is flat. Bowel sounds are normal.      Palpations: Abdomen is soft.      Hernia: No hernia is present.   Musculoskeletal:         General: Normal range of motion.      Cervical back: Normal range of motion and neck supple.      Right lower leg: No edema.      Left lower leg: No edema.   Lymphadenopathy:      Cervical: No cervical adenopathy.   Skin:     General: Skin is warm and dry.      Coloration: Skin is not jaundiced.      Findings: No lesion.   Neurological:      General: No focal deficit present.      Mental Status: He is alert and oriented to person, place, and time.      Cranial Nerves: No cranial nerve deficit.      Gait: Gait normal.   Psychiatric:         Mood and Affect: Mood normal.         Behavior: Behavior normal.         Judgment: Judgment normal.          Assessment and Plan (including Health Maintenance)      Problem List  Smart Sets  Document Outside HM   :    Plan:   Essential hypertension  -     lisinopriL-hydrochlorothiazide (PRINZIDE,ZESTORETIC) 20-12.5 mg per tablet; Take 1 tablet by mouth once daily.  Dispense: 90 tablet; Refill: 3  -     amLODIPine (NORVASC) 5 MG tablet; Take 1 tablet (5 mg total) by mouth once daily.  Dispense: 90 tablet; Refill: 3    Benign prostatic hyperplasia without lower urinary tract symptoms  -     tamsulosin (FLOMAX) 0.4 mg Cap; Take 1 capsule (0.4 mg total) by mouth once daily.  Dispense: 90 capsule; Refill: 3    Mixed hyperlipidemia           Health Maintenance Due   Topic Date Due    Hepatitis C Screening  Never done    TETANUS VACCINE  Never done    Shingles Vaccine (1 of 2) Never done       Problem List Items Addressed This Visit        Cardiac/Vascular    Hyperlipidemia      Other Visit Diagnoses     Essential hypertension     -  Primary    Relevant Medications    lisinopriL-hydrochlorothiazide (PRINZIDE,ZESTORETIC) 20-12.5 mg per tablet    amLODIPine (NORVASC) 5 MG tablet    Benign prostatic hyperplasia without lower urinary tract symptoms        Relevant Medications    tamsulosin (FLOMAX) 0.4 mg Cap          Health Maintenance Topics with due status: Not Due       Topic Last Completion Date    Lipid Panel 08/27/2021    Influenza Vaccine Not Due       Future Appointments   Date Time Provider Department Center   9/7/2022  3:30 PM Terrence Licea Jr., MD [facility] UROL [facility]   10/18/2022  8:45 AM Charley Kim MD [facility]   12/28/2022  7:30 AM Jon Gonzalez MD [facility]        There are no Patient Instructions on file for this visit.  Follow up in about 6 months (around 12/28/2022) for routine followup.     Signature:  Jon Gonzalez MD      Date of encounter: 6/28/22

## 2022-07-25 RX ORDER — LISINOPRIL 30 MG/1
30 TABLET ORAL DAILY
COMMUNITY
End: 2022-07-25 | Stop reason: SDUPTHER

## 2022-07-25 NOTE — TELEPHONE ENCOUNTER
Unable to take lisinopril with HCTZ in it due to his bladder cancer would like to take the plain lisinopril just increase from 20mg to 40mg?

## 2022-07-26 RX ORDER — LISINOPRIL 30 MG/1
30 TABLET ORAL DAILY
Qty: 30 TABLET | Refills: 11 | Status: SHIPPED | OUTPATIENT
Start: 2022-07-26 | End: 2022-12-28 | Stop reason: SDUPTHER

## 2022-09-07 ENCOUNTER — PROCEDURE VISIT (OUTPATIENT)
Dept: UROLOGY | Facility: CLINIC | Age: 77
End: 2022-09-07
Payer: MEDICARE

## 2022-09-07 VITALS
DIASTOLIC BLOOD PRESSURE: 81 MMHG | SYSTOLIC BLOOD PRESSURE: 177 MMHG | WEIGHT: 171 LBS | BODY MASS INDEX: 24.48 KG/M2 | HEART RATE: 54 BPM | HEIGHT: 70 IN

## 2022-09-07 DIAGNOSIS — N39.0 URINARY TRACT INFECTION WITH HEMATURIA, SITE UNSPECIFIED: Primary | ICD-10-CM

## 2022-09-07 DIAGNOSIS — C67.9 PRIMARY MALIGNANT NEOPLASM OF BLADDER: ICD-10-CM

## 2022-09-07 DIAGNOSIS — R31.9 URINARY TRACT INFECTION WITH HEMATURIA, SITE UNSPECIFIED: Primary | ICD-10-CM

## 2022-09-07 PROCEDURE — 87077 CULTURE AEROBIC IDENTIFY: CPT | Mod: ,,, | Performed by: CLINICAL MEDICAL LABORATORY

## 2022-09-07 PROCEDURE — 87077 CULTURE, URINE: ICD-10-PCS | Mod: ,,, | Performed by: CLINICAL MEDICAL LABORATORY

## 2022-09-07 PROCEDURE — 87086 CULTURE, URINE: ICD-10-PCS | Mod: ,,, | Performed by: CLINICAL MEDICAL LABORATORY

## 2022-09-07 PROCEDURE — 87186 CULTURE, URINE: ICD-10-PCS | Mod: ,,, | Performed by: CLINICAL MEDICAL LABORATORY

## 2022-09-07 PROCEDURE — 99024 PR POST-OP FOLLOW-UP VISIT: ICD-10-PCS | Mod: ,,, | Performed by: UROLOGY

## 2022-09-07 PROCEDURE — 87086 URINE CULTURE/COLONY COUNT: CPT | Mod: ,,, | Performed by: CLINICAL MEDICAL LABORATORY

## 2022-09-07 PROCEDURE — 99024 POSTOP FOLLOW-UP VISIT: CPT | Mod: ,,, | Performed by: UROLOGY

## 2022-09-07 PROCEDURE — 87186 SC STD MICRODIL/AGAR DIL: CPT | Mod: ,,, | Performed by: CLINICAL MEDICAL LABORATORY

## 2022-09-07 RX ORDER — LIDOCAINE HYDROCHLORIDE 20 MG/ML
10 JELLY TOPICAL
Status: DISCONTINUED | OUTPATIENT
Start: 2022-09-07 | End: 2022-09-07

## 2022-09-07 NOTE — PROCEDURES
Procedures       Patient seems to have more problem with abnormal calcifications of the bladder than from primary bladder tumor although I cannot rule out underlying recurrence of TCC.     Patient's bladder needs to have a calcification removed and underlying tissue biopsied to make sure there is not recurrent TCC.  That will have to be done under anesthesia because of the pain involved.  Mr. Figueroa wants to wait a couple months before doing that and that is probably reasonable.     We will plan on surgery Wednesday February 16th. Return for workup on Monday February 14.     Patient follow-up blood pressure 158/76. Apparently blood pressure has been running a little high lately. He was given samples of hydrochlorothiazide 25 mg.  He needs to continue to monitor blood pressure to see if addition of HCTZ helpful. Probably needs to be monitored by Dr. Gonzalez. (12/15/2021)     The note above is from the December visit for cystoscopy.  Patient still has extensive calcifications overlying the tumor sites and needs to have biopsies of the tissue as well as removal of the calcified tissue.  Bladder is thin where he has had previous tumors removed.  Patient postponed the procedure in extra month because of family problems.  He is doing reasonably well.  Voiding satisfactorily with aid of tamsulosin.  He is ready to proceed with the procedure for removal a calcification and bladder biopsies.  Surgery scheduled for Wednesday.      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  Urinalysis suggested infection.  Patient given Cipro 500 mg b.i.d. for 3 days while awaiting culture results.     He will be admitted to outpatient surgery Wednesday March 9 at 5:30 a.m. and be NPO other than morning medications.     Instructions for preadmission given by Ms. Fernandez and understood by Mr. Figueroa.  Informed consent obtained.   (March 7,  2022)  -----------------------------------------------------------------------------------------------  The above note is from Dr. Licea's last 2 clinic visits with Mr. Figueroa, with the last visit on March 7, 2022.     Mr. Figueroa was admitted to outpatient surgery department of [facility] the morning of Wednesday, March 9, 2022. He was taken to the Cystoscopy suite where he underwent Cystoscopy with TUR-BT under general LMA anesthesia by Dr. Licea (please see his operative procedure note for complete details). He tolerated the procedure well and was awakened and taken to PACU in stable condition. After PACU, he was returned to ASC room # 10.  He had an uneventful recovery. A 20 French Coronel catheter was left indwelling which he will remove at home in the morning. Urine in the leg bag is clear and yellow in color. He is feeling better now and is requesting to go home.     Mr. Figueroa is on a pain contract with [facility] here in [place]. A  was performed on Mr. Figueroa and his last narcotic prescription was written on December 14, 2021, but not filled until February 27, 2022 for Norco (Hydrocodone) 7.5/325 mg tabs # 60 with no refill by ANAHY Cordero from the [facility].   Houston's office was called and I spoke to her .  She said she would inform Mrs. Seth of the prescription Dr. Licea wrote for Mr. Figueroa.      Dr. Licea wrote Mr. Figueroa the following prescription:     Prescription:  Percocet (Oxycodone) 5/325 mg, # 16 with no refill.  Patient may take 1 tablet by mouth every 4 hours as needed for pain.     BRIDGER Moody, JAJA,  [facility]  -----------------------------------------------------------------------------------------------  Below is the Pathology report on Mr. Figueroa:     [facility]  [address] [place] [id number]  Ruben Figueroa [id number]  M, 76 yrs, 1945     Surgical Pathology (Final result)  [id number]  Authorizing Provider: Terrence Licea Jr., MD  Ordering Provider: Terrecne Licea Jr., MD  Ordering Location: [facility]  Collected: 03/09/2022 08:09 AM  Pathologist: Morgan Campos MD  Received: 03/09/2022 10:06 AM     Final Diagnosis  A. Urinary bladder lesion, biopsies:  - Markedly inflamed and partially calcified bladder wall stroma  - No overt malignancy identified  - See comments     B. Urinary bladder tumor bed, biopsies:  - Markedly inflamed and partially calcified bladder wall stroma  - Numerous separately received calcifications  - See comments     Comments:  The non calcified tissue in both specimens is markedly inflamed. There is some granulomatous inflammation seen within specimen (A). No intact urothelium seen. Dr. Benson has reviewed this case, and he agrees with the findings.     Gross Description:  A. Bladder.  The specimen is received in formalin designated bladder- calcified residue of previous TCC. See previous specimen and consists of multiple pink-red soft fragments of tissue measuring in aggregate 1.7 x 1.3 x 0.3 cm. The specimen is entirely submitted in cassette A1. Additionally within the container are multiple tan brown fragments of calcified material measuring in aggregate 1.2 x 0.7 x 0.3 cm. No blocks are submitted.     Grossing was completed by Lewis Robert.     B. Bladder.  The specimen is received in formalin designated bladder- tissue biopsies of calcified tumor bed and consists of multiple tan-brown fragments of calcified material measuring in aggregate 3.0 x 2.7 x 1.3 cm. Among the calcified material is a white to pink-red tissue fragment measuring 0.5 cm in greatest dimension and soft white-tan to tanbrown shaggy fragments of presumed tissue measuring in aggregate 2.4 x 1.6 x 0.4 cm. The tissue fragments are entirely submitted in cassettes B1 and B2. The remaining calcified material will be soaked in decalcifier overnight.     Grossing  was completed by Lewis Robert.     Microscopic Description:  A microscopic examination was performed and the diagnosis reflects the findings.     Electronically signed by Morgan Campos MD on 3/10/2022 at 1:31 PM            Electronically signed by Terrence Licea Jr., MD at 3/14/2022  5:49 PM  Admission (Discharged) on 3/9/2022      Patient was scheduled for cystoscopy today for follow-up of the calcifications of the bladder and bladder tumor recheck.  He feels he is doing okay other than increased urinary leakage.  He wants to postpone cystoscopy for now and feels he is doing okay.    Urinalysis loaded with pus cells and red cells.  Also amorphous crystals.  Dipstick had 2+ leukocytes, 3+ blood, pH of 6.0 and specific gravity 1.020.    Urine sent for culture.  Patient rescheduled for cystoscopy for Thursday December 8 at 1:30 p.m.  No cysto today.

## 2022-09-11 LAB — UA COMPLETE W REFLEX CULTURE PNL UR: ABNORMAL

## 2022-09-12 DIAGNOSIS — N39.0 URINARY TRACT INFECTION WITH HEMATURIA, SITE UNSPECIFIED: Primary | ICD-10-CM

## 2022-09-12 DIAGNOSIS — R31.9 URINARY TRACT INFECTION WITH HEMATURIA, SITE UNSPECIFIED: Primary | ICD-10-CM

## 2022-09-12 RX ORDER — CIPROFLOXACIN 500 MG/1
500 TABLET ORAL EVERY 12 HOURS
Qty: 20 TABLET | Refills: 0 | Status: SHIPPED | OUTPATIENT
Start: 2022-09-12 | End: 2022-09-22

## 2022-10-09 DIAGNOSIS — Z71.89 COMPLEX CARE COORDINATION: ICD-10-CM

## 2022-10-18 ENCOUNTER — OFFICE VISIT (OUTPATIENT)
Dept: DERMATOLOGY | Facility: CLINIC | Age: 77
End: 2022-10-18
Payer: MEDICARE

## 2022-10-18 VITALS — BODY MASS INDEX: 24.48 KG/M2 | WEIGHT: 171 LBS | HEIGHT: 70 IN

## 2022-10-18 DIAGNOSIS — Z85.828 HISTORY OF NONMELANOMA SKIN CANCER: ICD-10-CM

## 2022-10-18 DIAGNOSIS — D48.5 NEOPLASM OF UNCERTAIN BEHAVIOR OF SKIN: Primary | ICD-10-CM

## 2022-10-18 PROCEDURE — 88305 PATHOLOGY, DERMATOLOGY: ICD-10-PCS | Mod: 26,,, | Performed by: PATHOLOGY

## 2022-10-18 PROCEDURE — 99213 PR OFFICE/OUTPT VISIT, EST, LEVL III, 20-29 MIN: ICD-10-PCS | Mod: 25,,, | Performed by: STUDENT IN AN ORGANIZED HEALTH CARE EDUCATION/TRAINING PROGRAM

## 2022-10-18 PROCEDURE — 99213 OFFICE O/P EST LOW 20 MIN: CPT | Mod: 25,,, | Performed by: STUDENT IN AN ORGANIZED HEALTH CARE EDUCATION/TRAINING PROGRAM

## 2022-10-18 PROCEDURE — 11102 TANGNTL BX SKIN SINGLE LES: CPT | Mod: ,,, | Performed by: STUDENT IN AN ORGANIZED HEALTH CARE EDUCATION/TRAINING PROGRAM

## 2022-10-18 PROCEDURE — 11102 PR TANGENTIAL BIOPSY, SKIN, SINGLE LESION: ICD-10-PCS | Mod: ,,, | Performed by: STUDENT IN AN ORGANIZED HEALTH CARE EDUCATION/TRAINING PROGRAM

## 2022-10-18 PROCEDURE — 88305 TISSUE EXAM BY PATHOLOGIST: CPT | Mod: 26,,, | Performed by: PATHOLOGY

## 2022-10-18 PROCEDURE — 88305 TISSUE EXAM BY PATHOLOGIST: CPT | Mod: SUR | Performed by: STUDENT IN AN ORGANIZED HEALTH CARE EDUCATION/TRAINING PROGRAM

## 2022-10-18 NOTE — PROGRESS NOTES
[facility]  Scotty Kim MD    [address] [place] [address]  [phone]    Fax: [phone]    Patient Name: Ruben Figueroa  Medical Record Number: [id number]  PCP: Jon Gonzalez MD  Age: 77 y.o. : 1945  Contact: [phone] (home)     History of Present Illness:     Ruben Figueroa is a 77 y.o.  male here for follow up of BCC of R cheek and L leg treated with Mohs surgery  and several actinic keratoses that were previously treated with LN2 and resolved. Today, he is complaining of scaly skin lesion of the cheek.    The patient has no other concerns today.    Review of Systems:     Unremarkable other than mentioned above.     Physical Exam:     General: Relaxed, oriented, alert    Skin examination of the scalp, face, neck, chest, back, abdomen, upper extremities and lower extremities were normal except for as listed below        Assessment and Plan:     1. Neoplasm of Uncertain Behavior   - pink indurated papule  located on the R cheek  Ddx includes: SK vs SCC  Shave biopsy      Pre-procedure Diagnosis: as above  Post_procedure Diagnosis: same  Estimated Blood Loss: <1cc    Findings: None  Complications: None  Specimens: to pathology      Written informed consent was obtained after discussing risks including pain, bleeding, infection, recurrence and scarring. The biopsy site was sterilely prepped with alcohol, which was allowed to dry completely, then locally infiltrated with 1% lidocaine with epinephrine, ~3 cc total. A shave biopsy was obtained using a Dermablade/15 and the specimen was sent to dermatopathology. Aluminum chloride was used for hemostasis. Antibiotic ointment and a clean dressing were applied. The patient tolerated the procedure well without complications. Verbal and written wound care instructions were given.    Scotty Kim MD           2. History of NMSC   Well-healed scar on R cheek and L leg.   No e/o recurrence   Recommend sunscreen  and good photoprotection         Return to clinic in 6 months    AVS printed with patient instructions     Scotty Kim MD   Mohs Surgery/Dermatologic Oncology  Dermatology

## 2022-10-20 LAB
DHEA SERPL-MCNC: NORMAL
ESTROGEN SERPL-MCNC: NORMAL PG/ML
INSULIN SERPL-ACNC: NORMAL U[IU]/ML
LAB AP GROSS DESCRIPTION: NORMAL
LAB AP LABORATORY NOTES: NORMAL
LAB AP SPEC A DDX: NORMAL
LAB AP SPEC A MORPHOLOGY: NORMAL
LAB AP SPEC A PROCEDURE: NORMAL
T3RU NFR SERPL: NORMAL %

## 2022-12-28 ENCOUNTER — OFFICE VISIT (OUTPATIENT)
Dept: FAMILY MEDICINE | Facility: CLINIC | Age: 77
End: 2022-12-28
Payer: MEDICARE

## 2022-12-28 VITALS
RESPIRATION RATE: 18 BRPM | HEIGHT: 70 IN | DIASTOLIC BLOOD PRESSURE: 78 MMHG | OXYGEN SATURATION: 97 % | HEART RATE: 49 BPM | SYSTOLIC BLOOD PRESSURE: 140 MMHG | WEIGHT: 172.63 LBS | BODY MASS INDEX: 24.71 KG/M2

## 2022-12-28 DIAGNOSIS — F32.A DEPRESSION, UNSPECIFIED DEPRESSION TYPE: ICD-10-CM

## 2022-12-28 DIAGNOSIS — I10 ESSENTIAL HYPERTENSION: ICD-10-CM

## 2022-12-28 DIAGNOSIS — N40.0 BENIGN PROSTATIC HYPERPLASIA WITHOUT LOWER URINARY TRACT SYMPTOMS: ICD-10-CM

## 2022-12-28 DIAGNOSIS — E78.2 MIXED HYPERLIPIDEMIA: Primary | ICD-10-CM

## 2022-12-28 LAB
ALBUMIN SERPL BCP-MCNC: 3.5 G/DL (ref 3.5–5)
ALBUMIN/GLOB SERPL: 1 {RATIO}
ALP SERPL-CCNC: 74 U/L (ref 45–115)
ALT SERPL W P-5'-P-CCNC: 18 U/L (ref 16–61)
ANION GAP SERPL CALCULATED.3IONS-SCNC: 13 MMOL/L (ref 7–16)
AST SERPL W P-5'-P-CCNC: 15 U/L (ref 15–37)
BASOPHILS # BLD AUTO: 0.06 K/UL (ref 0–0.2)
BASOPHILS NFR BLD AUTO: 0.8 % (ref 0–1)
BILIRUB SERPL-MCNC: 0.4 MG/DL (ref ?–1.2)
BUN SERPL-MCNC: 12 MG/DL (ref 7–18)
BUN/CREAT SERPL: 10 (ref 6–20)
CALCIUM SERPL-MCNC: 9 MG/DL (ref 8.5–10.1)
CHLORIDE SERPL-SCNC: 109 MMOL/L (ref 98–107)
CHOLEST SERPL-MCNC: 185 MG/DL (ref 0–200)
CHOLEST/HDLC SERPL: 2.8 {RATIO}
CO2 SERPL-SCNC: 25 MMOL/L (ref 21–32)
CREAT SERPL-MCNC: 1.15 MG/DL (ref 0.7–1.3)
DIFFERENTIAL METHOD BLD: ABNORMAL
EGFR (NO RACE VARIABLE) (RUSH/TITUS): 65 ML/MIN/1.73M²
EOSINOPHIL # BLD AUTO: 0.31 K/UL (ref 0–0.5)
EOSINOPHIL NFR BLD AUTO: 3.9 % (ref 1–4)
ERYTHROCYTE [DISTWIDTH] IN BLOOD BY AUTOMATED COUNT: 13.4 % (ref 11.5–14.5)
GLOBULIN SER-MCNC: 3.5 G/DL (ref 2–4)
GLUCOSE SERPL-MCNC: 97 MG/DL (ref 74–106)
HCT VFR BLD AUTO: 43.6 % (ref 40–54)
HDLC SERPL-MCNC: 67 MG/DL (ref 40–60)
HGB BLD-MCNC: 14.1 G/DL (ref 13.5–18)
IMM GRANULOCYTES # BLD AUTO: 0.02 K/UL (ref 0–0.04)
IMM GRANULOCYTES NFR BLD: 0.3 % (ref 0–0.4)
LDLC SERPL CALC-MCNC: 99 MG/DL
LDLC/HDLC SERPL: 1.5 {RATIO}
LYMPHOCYTES # BLD AUTO: 3.1 K/UL (ref 1–4.8)
LYMPHOCYTES NFR BLD AUTO: 39.3 % (ref 27–41)
MCH RBC QN AUTO: 31.3 PG (ref 27–31)
MCHC RBC AUTO-ENTMCNC: 32.3 G/DL (ref 32–36)
MCV RBC AUTO: 96.7 FL (ref 80–96)
MONOCYTES # BLD AUTO: 0.61 K/UL (ref 0–0.8)
MONOCYTES NFR BLD AUTO: 7.7 % (ref 2–6)
MPC BLD CALC-MCNC: 10.3 FL (ref 9.4–12.4)
NEUTROPHILS # BLD AUTO: 3.79 K/UL (ref 1.8–7.7)
NEUTROPHILS NFR BLD AUTO: 48 % (ref 53–65)
NONHDLC SERPL-MCNC: 118 MG/DL
NRBC # BLD AUTO: 0 X10E3/UL
NRBC, AUTO (.00): 0 %
PLATELET # BLD AUTO: 188 K/UL (ref 150–400)
POTASSIUM SERPL-SCNC: 3.5 MMOL/L (ref 3.5–5.1)
PROT SERPL-MCNC: 7 G/DL (ref 6.4–8.2)
RBC # BLD AUTO: 4.51 M/UL (ref 4.6–6.2)
SODIUM SERPL-SCNC: 143 MMOL/L (ref 136–145)
TRIGL SERPL-MCNC: 93 MG/DL (ref 35–150)
VLDLC SERPL-MCNC: 19 MG/DL
WBC # BLD AUTO: 7.89 K/UL (ref 4.5–11)

## 2022-12-28 PROCEDURE — 80061 LIPID PANEL: CPT | Mod: ,,, | Performed by: CLINICAL MEDICAL LABORATORY

## 2022-12-28 PROCEDURE — 85025 COMPLETE CBC W/AUTO DIFF WBC: CPT | Mod: ,,, | Performed by: CLINICAL MEDICAL LABORATORY

## 2022-12-28 PROCEDURE — 80053 COMPREHEN METABOLIC PANEL: CPT | Mod: ,,, | Performed by: CLINICAL MEDICAL LABORATORY

## 2022-12-28 PROCEDURE — 99214 OFFICE O/P EST MOD 30 MIN: CPT | Mod: ,,, | Performed by: FAMILY MEDICINE

## 2022-12-28 PROCEDURE — 80061 LIPID PANEL: ICD-10-PCS | Mod: ,,, | Performed by: CLINICAL MEDICAL LABORATORY

## 2022-12-28 PROCEDURE — 99214 PR OFFICE/OUTPT VISIT, EST, LEVL IV, 30-39 MIN: ICD-10-PCS | Mod: ,,, | Performed by: FAMILY MEDICINE

## 2022-12-28 PROCEDURE — 85025 CBC WITH DIFFERENTIAL: ICD-10-PCS | Mod: ,,, | Performed by: CLINICAL MEDICAL LABORATORY

## 2022-12-28 PROCEDURE — 80053 COMPREHENSIVE METABOLIC PANEL: ICD-10-PCS | Mod: ,,, | Performed by: CLINICAL MEDICAL LABORATORY

## 2022-12-28 RX ORDER — AMLODIPINE BESYLATE 5 MG/1
5 TABLET ORAL DAILY
Qty: 90 TABLET | Refills: 3 | Status: SHIPPED | OUTPATIENT
Start: 2022-12-28 | End: 2022-12-28 | Stop reason: SDUPTHER

## 2022-12-28 RX ORDER — SIMVASTATIN 10 MG/1
10 TABLET, FILM COATED ORAL NIGHTLY
Qty: 90 TABLET | Refills: 3 | Status: SHIPPED | OUTPATIENT
Start: 2022-12-28

## 2022-12-28 RX ORDER — AMLODIPINE BESYLATE 5 MG/1
5 TABLET ORAL DAILY
Qty: 90 TABLET | Refills: 3 | Status: SHIPPED | OUTPATIENT
Start: 2022-12-28 | End: 2023-06-09 | Stop reason: SDUPTHER

## 2022-12-28 RX ORDER — LISINOPRIL 30 MG/1
30 TABLET ORAL DAILY
Qty: 90 TABLET | Refills: 3 | Status: SHIPPED | OUTPATIENT
Start: 2022-12-28 | End: 2022-12-28 | Stop reason: SDUPTHER

## 2022-12-28 RX ORDER — TRAZODONE HYDROCHLORIDE 100 MG/1
100 TABLET ORAL NIGHTLY
Qty: 90 TABLET | Refills: 3 | Status: SHIPPED | OUTPATIENT
Start: 2022-12-28 | End: 2022-12-28 | Stop reason: SDUPTHER

## 2022-12-28 RX ORDER — SIMVASTATIN 10 MG/1
10 TABLET, FILM COATED ORAL NIGHTLY
Qty: 90 TABLET | Refills: 3 | Status: SHIPPED | OUTPATIENT
Start: 2022-12-28 | End: 2022-12-28 | Stop reason: SDUPTHER

## 2022-12-28 RX ORDER — TAMSULOSIN HYDROCHLORIDE 0.4 MG/1
0.4 CAPSULE ORAL DAILY
Qty: 90 CAPSULE | Refills: 3 | Status: SHIPPED | OUTPATIENT
Start: 2022-12-28 | End: 2022-12-28 | Stop reason: SDUPTHER

## 2022-12-28 RX ORDER — TRAZODONE HYDROCHLORIDE 100 MG/1
100 TABLET ORAL NIGHTLY
Qty: 90 TABLET | Refills: 3 | Status: SHIPPED | OUTPATIENT
Start: 2022-12-28 | End: 2023-01-05 | Stop reason: SDUPTHER

## 2022-12-28 RX ORDER — TAMSULOSIN HYDROCHLORIDE 0.4 MG/1
0.4 CAPSULE ORAL DAILY
Qty: 90 CAPSULE | Refills: 3 | Status: SHIPPED | OUTPATIENT
Start: 2022-12-28

## 2022-12-28 RX ORDER — LISINOPRIL 30 MG/1
30 TABLET ORAL DAILY
Qty: 90 TABLET | Refills: 3 | Status: SHIPPED | OUTPATIENT
Start: 2022-12-28 | End: 2023-06-23 | Stop reason: DRUGHIGH

## 2022-12-28 NOTE — PROGRESS NOTES
Jon Gonzalez MD        PATIENT NAME: Ruben Figueroa  : 1945  DATE: 22  MRN: [id number]      Billing Provider: Jon Gonzalez MD  Level of Service: [place] OFFICE/OUTPT VISIT, EST, LEVL IV, 30-39 MIN  Patient PCP Information       Provider PCP Type    Jon Gonzalez MD General            Reason for Visit / Chief Complaint: Hypertension (Check up for med refills)       Update PCP  Update Chief Complaint         History of Present Illness / Problem Focused Workflow     Ruben Figueroa presents to the clinic with Hypertension (Check up for med refills)     Routine followup.  No significant interval change      Hypertension  Pertinent negatives include no chest pain, headaches, neck pain or palpitations.     Review of Systems     Review of Systems   Constitutional:  Negative for activity change, appetite change, fever and unexpected weight change.   HENT:  Negative for congestion, hearing loss, rhinorrhea, sinus pressure, sinus pain, sore throat and trouble swallowing.    Eyes:  Negative for photophobia, pain, discharge and visual disturbance.   Respiratory:  Negative for cough, chest tightness, wheezing and stridor.    Cardiovascular:  Negative for chest pain, palpitations and leg swelling.   Gastrointestinal:  Negative for abdominal pain, blood in stool, constipation, diarrhea, nausea and vomiting.   Endocrine: Positive for polydipsia and polyuria. Negative for polyphagia.   Genitourinary:  Positive for urgency. Negative for difficulty urinating, flank pain and hematuria.   Musculoskeletal:  Negative for arthralgias, joint swelling and neck pain.   Skin:  Negative for rash.   Allergic/Immunologic: Negative for food allergies.   Neurological:  Negative for dizziness, tremors, seizures, syncope, weakness and headaches.   Psychiatric/Behavioral:  Negative for behavioral problems, confusion, decreased concentration, dysphoric mood and hallucinations. The patient is not nervous/anxious.       Medical / Social  / Family History     Past Medical History:   Diagnosis Date    BPH (benign prostatic hyperplasia)     Chronic prostatitis     Hyperlipidemia     Hypertension     Insomnia     Malignant neoplasm of bladder     Sciatica        Past Surgical History:   Procedure Laterality Date    BLADDER TUMOR EXCISION      skin cancer removed         Social History    reports that he quit smoking about 33 years ago. His smoking use included cigarettes. He started smoking about 63 years ago. He smoked an average of 1 pack per day. He has been exposed to tobacco smoke. He has never used smokeless tobacco. He reports current alcohol use. He reports that he does not use drugs.    Family History  's family history includes Bladder Cancer in his father; Heart disease in his father; Hypertension in his mother.    Medications and Allergies     Medications  No outpatient medications have been marked as taking for the 12/28/22 encounter (Office Visit) with Jon Gonzalez MD.       Allergies  Review of patient's allergies indicates:   Allergen Reactions    Doxycycline hyclate     Penicillins        Physical Examination     Vitals:    12/28/22 0734   BP: (!) 140/78   Pulse: (!) 49   Resp: 18     Physical Exam  Constitutional:       General: He is not in acute distress.     Appearance: Normal appearance.   HENT:      Head: Normocephalic.      Right Ear: Tympanic membrane and ear canal normal.      Left Ear: Tympanic membrane and ear canal normal.      Nose: Nose normal.      Mouth/Throat:      Mouth: Mucous membranes are moist.      Pharynx: No oropharyngeal exudate.   Eyes:      Extraocular Movements: Extraocular movements intact.      Pupils: Pupils are equal, round, and reactive to light.   Cardiovascular:      Rate and Rhythm: Normal rate and regular rhythm.      Heart sounds: No murmur heard.  Pulmonary:      Effort: Pulmonary effort is normal.      Breath sounds: Normal breath sounds. No wheezing.   Abdominal:      General: Abdomen  is flat. Bowel sounds are normal.      Palpations: Abdomen is soft.      Hernia: No hernia is present.   Musculoskeletal:         General: Normal range of motion.      Cervical back: Normal range of motion and neck supple.      Right lower leg: No edema.      Left lower leg: No edema.   Lymphadenopathy:      Cervical: No cervical adenopathy.   Skin:     General: Skin is warm and dry.      Coloration: Skin is not jaundiced.      Findings: No lesion.   Neurological:      General: No focal deficit present.      Mental Status: He is alert and oriented to person, place, and time.      Cranial Nerves: No cranial nerve deficit.      Gait: Gait normal.   Psychiatric:         Mood and Affect: Mood normal.         Behavior: Behavior normal.         Judgment: Judgment normal.        Assessment and Plan (including Health Maintenance)      Problem List  Smart Sets  Document Outside HM   :    Plan:   Mixed hyperlipidemia  -     Lipid Panel    Depression, unspecified depression type  -     Discontinue: traZODone (DESYREL) 100 MG tablet; Take 1 tablet (100 mg total) by mouth every evening.  Dispense: 90 tablet; Refill: 3    Benign prostatic hyperplasia without lower urinary tract symptoms  -     Discontinue: tamsulosin (FLOMAX) 0.4 mg Cap; Take 1 capsule (0.4 mg total) by mouth once daily.  Dispense: 90 capsule; Refill: 3    Essential hypertension  -     Discontinue: amLODIPine (NORVASC) 5 MG tablet; Take 1 tablet (5 mg total) by mouth once daily.  Dispense: 90 tablet; Refill: 3  -     CBC Auto Differential  -     Comprehensive Metabolic Panel    Other orders  -     Discontinue: lisinopriL (PRINIVIL,ZESTRIL) 30 MG tablet; Take 1 tablet (30 mg total) by mouth once daily.  Dispense: 90 tablet; Refill: 3  -     Discontinue: simvastatin (ZOCOR) 10 MG tablet; Take 1 tablet (10 mg total) by mouth every evening. Is taking 3 times a week  Dispense: 90 tablet; Refill: 3           Health Maintenance Due   Topic Date Due    Hepatitis C  Screening  Never done    TETANUS VACCINE  Never done    Shingles Vaccine (1 of 2) Never done       Problem List Items Addressed This Visit          Cardiac/Vascular    Hyperlipidemia - Primary    Relevant Orders    Lipid Panel (Completed)     Other Visit Diagnoses       Depression, unspecified depression type        Benign prostatic hyperplasia without lower urinary tract symptoms        Essential hypertension        Relevant Orders    CBC Auto Differential (Completed)    Comprehensive Metabolic Panel (Completed)            Health Maintenance Topics with due status: Not Due       Topic Last Completion Date    Lipid Panel 12/28/2022       Future Appointments   Date Time Provider Department Center   1/12/2023  1:15 PM Terrence Licea Jr., MD [facility]   4/21/2023  8:00 AM Charley Kim MD [facility]        There are no Patient Instructions on file for this visit.  Follow up in about 6 months (around 6/28/2023) for routine followup.     Signature:  Jon Gonzalez MD      Date of encounter: 12/28/22

## 2023-01-05 DIAGNOSIS — F32.A DEPRESSION, UNSPECIFIED DEPRESSION TYPE: ICD-10-CM

## 2023-01-05 RX ORDER — TRAZODONE HYDROCHLORIDE 100 MG/1
100 TABLET ORAL NIGHTLY
Qty: 90 TABLET | Refills: 3 | Status: SHIPPED | OUTPATIENT
Start: 2023-01-05 | End: 2023-12-13 | Stop reason: SDUPTHER

## 2023-01-10 ENCOUNTER — OFFICE VISIT (OUTPATIENT)
Dept: FAMILY MEDICINE | Facility: CLINIC | Age: 78
End: 2023-01-10
Payer: MEDICARE

## 2023-01-10 VITALS
WEIGHT: 170 LBS | BODY MASS INDEX: 24.34 KG/M2 | RESPIRATION RATE: 20 BRPM | DIASTOLIC BLOOD PRESSURE: 90 MMHG | HEIGHT: 70 IN | SYSTOLIC BLOOD PRESSURE: 168 MMHG | OXYGEN SATURATION: 100 % | HEART RATE: 64 BPM

## 2023-01-10 DIAGNOSIS — I10 ESSENTIAL HYPERTENSION: ICD-10-CM

## 2023-01-10 DIAGNOSIS — F41.9 ANXIETY: ICD-10-CM

## 2023-01-10 DIAGNOSIS — R35.89 POLYURIA: Primary | ICD-10-CM

## 2023-01-10 LAB
BACTERIA #/AREA URNS HPF: ABNORMAL /HPF
BILIRUB UR QL STRIP: NEGATIVE
CLARITY UR: CLEAR
COLOR UR: ABNORMAL
GLUCOSE UR STRIP-MCNC: NORMAL MG/DL
KETONES UR STRIP-SCNC: NEGATIVE MG/DL
LEUKOCYTE ESTERASE UR QL STRIP: ABNORMAL
MUCOUS, UA: ABNORMAL /LPF
NITRITE UR QL STRIP: NEGATIVE
PH UR STRIP: 6 PH UNITS
PROT UR QL STRIP: NEGATIVE
RBC # UR STRIP: ABNORMAL /UL
RBC #/AREA URNS HPF: 3 /HPF
SP GR UR STRIP: 1.01
SQUAMOUS #/AREA URNS LPF: ABNORMAL /HPF
UROBILINOGEN UR STRIP-ACNC: NORMAL MG/DL
WBC #/AREA URNS HPF: 81 /HPF

## 2023-01-10 PROCEDURE — 81001 URINALYSIS, REFLEX TO URINE CULTURE: ICD-10-PCS | Mod: ,,, | Performed by: CLINICAL MEDICAL LABORATORY

## 2023-01-10 PROCEDURE — 87086 CULTURE, URINE: ICD-10-PCS | Mod: ,,, | Performed by: CLINICAL MEDICAL LABORATORY

## 2023-01-10 PROCEDURE — 81001 URINALYSIS AUTO W/SCOPE: CPT | Mod: ,,, | Performed by: CLINICAL MEDICAL LABORATORY

## 2023-01-10 PROCEDURE — 87077 CULTURE, URINE: ICD-10-PCS | Mod: ,,, | Performed by: CLINICAL MEDICAL LABORATORY

## 2023-01-10 PROCEDURE — 87077 CULTURE AEROBIC IDENTIFY: CPT | Mod: ,,, | Performed by: CLINICAL MEDICAL LABORATORY

## 2023-01-10 PROCEDURE — 99214 PR OFFICE/OUTPT VISIT, EST, LEVL IV, 30-39 MIN: ICD-10-PCS | Mod: ,,, | Performed by: FAMILY MEDICINE

## 2023-01-10 PROCEDURE — 99214 OFFICE O/P EST MOD 30 MIN: CPT | Mod: ,,, | Performed by: FAMILY MEDICINE

## 2023-01-10 PROCEDURE — 87086 URINE CULTURE/COLONY COUNT: CPT | Mod: ,,, | Performed by: CLINICAL MEDICAL LABORATORY

## 2023-01-10 PROCEDURE — 87186 CULTURE, URINE: ICD-10-PCS | Mod: ,,, | Performed by: CLINICAL MEDICAL LABORATORY

## 2023-01-10 PROCEDURE — 87186 SC STD MICRODIL/AGAR DIL: CPT | Mod: ,,, | Performed by: CLINICAL MEDICAL LABORATORY

## 2023-01-10 RX ORDER — LISINOPRIL 40 MG/1
40 TABLET ORAL DAILY
Qty: 90 TABLET | Refills: 3 | Status: ON HOLD | OUTPATIENT
Start: 2023-01-10 | End: 2023-10-04 | Stop reason: ALTCHOICE

## 2023-01-10 RX ORDER — HYDROCHLOROTHIAZIDE 12.5 MG/1
12.5 TABLET ORAL DAILY
Qty: 90 TABLET | Refills: 3 | Status: SHIPPED | OUTPATIENT
Start: 2023-01-10 | End: 2024-01-10

## 2023-01-10 RX ORDER — ALPRAZOLAM 0.5 MG/1
0.5 TABLET ORAL 3 TIMES DAILY PRN
Qty: 90 TABLET | Refills: 2 | Status: SHIPPED | OUTPATIENT
Start: 2023-01-10 | End: 2023-02-09

## 2023-01-10 NOTE — PROGRESS NOTES
Jon Gonzalez MD        PATIENT NAME: Ruben Figueroa  : 1945  DATE: 1/10/23  MRN: [id number]      Billing Provider: Jon Gonzalez MD  Level of Service: [place] OFFICE/OUTPT VISIT, EST, LEVL IV, 30-39 MIN  Patient PCP Information       Provider PCP Type    Jon Gonzalez MD General            Reason for Visit / Chief Complaint: Hypertension (Increased BP unable to control) and Urinary Frequency (Urinating every 5-10 minutes)       Update PCP  Update Chief Complaint         History of Present Illness / Problem Focused Workflow     Ruben Figueroa presents to the clinic with Hypertension (Increased BP unable to control) and Urinary Frequency (Urinating every 5-10 minutes)     BP has been high.  Under stress.  Polyuria q 10 min at times.      Hypertension  Pertinent negatives include no chest pain, headaches, neck pain or palpitations.   Urinary Frequency   Associated symptoms include frequency. Pertinent negatives include no flank pain, hematuria, nausea, constipation or rash. His past medical history is significant for hypertension.     Review of Systems     Review of Systems   Constitutional:  Negative for activity change, appetite change, fever and unexpected weight change.   HENT:  Negative for congestion, rhinorrhea, sinus pressure, sinus pain, sore throat and trouble swallowing.    Eyes:  Negative for photophobia, pain, discharge and visual disturbance.   Respiratory:  Negative for cough, chest tightness, wheezing and stridor.    Cardiovascular:  Negative for chest pain, palpitations and leg swelling.   Gastrointestinal:  Negative for abdominal pain, blood in stool, constipation, diarrhea and nausea.   Endocrine: Negative for polydipsia, polyphagia and polyuria.   Genitourinary:  Positive for frequency. Negative for difficulty urinating, flank pain and hematuria.   Musculoskeletal:  Negative for arthralgias and neck pain.   Skin:  Negative for rash.   Allergic/Immunologic: Negative for food allergies.    Neurological:  Negative for dizziness, tremors, seizures, syncope, weakness (global weakness) and headaches.   Psychiatric/Behavioral:  Negative for behavioral problems, confusion, decreased concentration, dysphoric mood and hallucinations. The patient is not nervous/anxious.       Medical / Social / Family History     Past Medical History:   Diagnosis Date    BPH (benign prostatic hyperplasia)     Chronic prostatitis     Hyperlipidemia     Hypertension     Insomnia     Malignant neoplasm of bladder     Sciatica        Past Surgical History:   Procedure Laterality Date    BLADDER TUMOR EXCISION      skin cancer removed         Social History    reports that he quit smoking about 33 years ago. His smoking use included cigarettes. He started smoking about 63 years ago. He smoked an average of 1 pack per day. He has been exposed to tobacco smoke. He has never used smokeless tobacco. He reports current alcohol use. He reports that he does not use drugs.    Family History  's family history includes Bladder Cancer in his father; Heart disease in his father; Hypertension in his mother.    Medications and Allergies     Medications  No outpatient medications have been marked as taking for the 1/10/23 encounter (Office Visit) with Jon Gonzalez MD.       Allergies  Review of patient's allergies indicates:   Allergen Reactions    Doxycycline hyclate     Penicillins        Physical Examination     Vitals:    01/10/23 0859   BP: (!) 168/90   Pulse: 64   Resp: 20     Physical Exam  Constitutional:       General: He is not in acute distress.     Appearance: Normal appearance.   HENT:      Head: Normocephalic.      Right Ear: Tympanic membrane and ear canal normal.      Left Ear: Tympanic membrane and ear canal normal.      Nose: Nose normal.      Mouth/Throat:      Mouth: Mucous membranes are moist.      Pharynx: No oropharyngeal exudate.   Eyes:      Extraocular Movements: Extraocular movements intact.      Pupils:  Pupils are equal, round, and reactive to light.   Cardiovascular:      Rate and Rhythm: Normal rate and regular rhythm.      Heart sounds: No murmur heard.  Pulmonary:      Effort: Pulmonary effort is normal.      Breath sounds: Normal breath sounds. No wheezing.   Abdominal:      General: Abdomen is flat. Bowel sounds are normal.      Palpations: Abdomen is soft.      Hernia: No hernia is present.   Musculoskeletal:         General: Normal range of motion.      Cervical back: Normal range of motion and neck supple.      Right lower leg: No edema.      Left lower leg: No edema.   Lymphadenopathy:      Cervical: No cervical adenopathy.   Skin:     General: Skin is warm and dry.      Coloration: Skin is not jaundiced.      Findings: No lesion.   Neurological:      General: No focal deficit present.      Mental Status: He is alert and oriented to person, place, and time.      Cranial Nerves: No cranial nerve deficit.      Gait: Gait normal.   Psychiatric:         Mood and Affect: Mood normal.         Behavior: Behavior normal.         Judgment: Judgment normal.        Assessment and Plan (including Health Maintenance)      Problem List  Smart eVestment  Document Outside HM   :    Plan:   Polyuria  -     Urinalysis, Reflex to Urine Culture  -     Urinalysis, Microscopic  -     Urine culture    Essential hypertension  -     ALPRAZolam (XANAX) 0.5 MG tablet; Take 1 tablet (0.5 mg total) by mouth 3 (three) times daily as needed for Anxiety.  Dispense: 90 tablet; Refill: 2  -     lisinopriL (PRINIVIL,ZESTRIL) 40 MG tablet; Take 1 tablet (40 mg total) by mouth once daily.  Dispense: 90 tablet; Refill: 3  -     hydroCHLOROthiazide (HYDRODIURIL) 12.5 MG Tab; Take 1 tablet (12.5 mg total) by mouth once daily.  Dispense: 90 tablet; Refill: 3    Anxiety  -     ALPRAZolam (XANAX) 0.5 MG tablet; Take 1 tablet (0.5 mg total) by mouth 3 (three) times daily as needed for Anxiety.  Dispense: 90 tablet; Refill: 2           Health Maintenance  Due   Topic Date Due    Hepatitis C Screening  Never done    TETANUS VACCINE  Never done    Shingles Vaccine (1 of 2) Never done    Influenza Vaccine (1) Never done       Problem List Items Addressed This Visit    None  Visit Diagnoses       Polyuria    -  Primary    Relevant Orders    Urinalysis, Reflex to Urine Culture (Completed)    Urinalysis, Microscopic (Completed)    Urine culture (Completed)    Essential hypertension        Relevant Medications    ALPRAZolam (XANAX) 0.5 MG tablet    lisinopriL (PRINIVIL,ZESTRIL) 40 MG tablet    hydroCHLOROthiazide (HYDRODIURIL) 12.5 MG Tab    Anxiety        Relevant Medications    ALPRAZolam (XANAX) 0.5 MG tablet            Health Maintenance Topics with due status: Not Due       Topic Last Completion Date    Lipid Panel 12/28/2022       Future Appointments   Date Time Provider Department Center   2/10/2023  9:00 AM Jon Gonzalez MD [facility]   2/16/2023  2:30 PM [facility] Jr[facility]   4/21/2023  8:00 AM Charley Kim MD [facility]        There are no Patient Instructions on file for this visit.  Follow up in about 4 weeks (around 2/7/2023) for routine followup.     Signature:  Jon Gonzalez MD      Date of encounter: 1/10/23

## 2023-01-12 ENCOUNTER — PROCEDURE VISIT (OUTPATIENT)
Dept: UROLOGY | Facility: CLINIC | Age: 78
End: 2023-01-12
Payer: MEDICARE

## 2023-01-12 VITALS
WEIGHT: 170 LBS | TEMPERATURE: 98 F | HEART RATE: 80 BPM | SYSTOLIC BLOOD PRESSURE: 147 MMHG | HEIGHT: 70 IN | DIASTOLIC BLOOD PRESSURE: 78 MMHG | BODY MASS INDEX: 24.34 KG/M2

## 2023-01-12 DIAGNOSIS — N39.0 URINARY TRACT INFECTION WITH HEMATURIA, SITE UNSPECIFIED: ICD-10-CM

## 2023-01-12 DIAGNOSIS — R35.0 URINARY FREQUENCY: ICD-10-CM

## 2023-01-12 DIAGNOSIS — R31.9 URINARY TRACT INFECTION WITH HEMATURIA, SITE UNSPECIFIED: ICD-10-CM

## 2023-01-12 DIAGNOSIS — I10 ESSENTIAL HYPERTENSION: ICD-10-CM

## 2023-01-12 DIAGNOSIS — C67.9 PRIMARY MALIGNANT NEOPLASM OF BLADDER: Primary | ICD-10-CM

## 2023-01-12 PROCEDURE — 87086 URINE CULTURE/COLONY COUNT: CPT | Mod: ,,, | Performed by: CLINICAL MEDICAL LABORATORY

## 2023-01-12 PROCEDURE — 99213 PR OFFICE/OUTPT VISIT, EST, LEVL III, 20-29 MIN: ICD-10-PCS | Mod: S$PBB,,, | Performed by: UROLOGY

## 2023-01-12 PROCEDURE — 87186 SC STD MICRODIL/AGAR DIL: CPT | Mod: ,,, | Performed by: CLINICAL MEDICAL LABORATORY

## 2023-01-12 PROCEDURE — 87077 CULTURE AEROBIC IDENTIFY: CPT | Mod: ,,, | Performed by: CLINICAL MEDICAL LABORATORY

## 2023-01-12 PROCEDURE — 87086 CULTURE, URINE: ICD-10-PCS | Mod: ,,, | Performed by: CLINICAL MEDICAL LABORATORY

## 2023-01-12 PROCEDURE — 87186 CULTURE, URINE: ICD-10-PCS | Mod: ,,, | Performed by: CLINICAL MEDICAL LABORATORY

## 2023-01-12 PROCEDURE — 87077 CULTURE, URINE: ICD-10-PCS | Mod: ,,, | Performed by: CLINICAL MEDICAL LABORATORY

## 2023-01-12 PROCEDURE — 99213 OFFICE O/P EST LOW 20 MIN: CPT | Mod: S$PBB,,, | Performed by: UROLOGY

## 2023-01-13 DIAGNOSIS — C67.9 PRIMARY MALIGNANT NEOPLASM OF BLADDER: Primary | ICD-10-CM

## 2023-01-14 DIAGNOSIS — N41.0 ACUTE PROSTATITIS: Primary | ICD-10-CM

## 2023-01-14 LAB — UA COMPLETE W REFLEX CULTURE PNL UR: ABNORMAL

## 2023-01-14 RX ORDER — LEVOFLOXACIN 500 MG/1
500 TABLET, FILM COATED ORAL DAILY
Qty: 30 TABLET | Refills: 0 | Status: ON HOLD | OUTPATIENT
Start: 2023-01-14 | End: 2023-10-04 | Stop reason: ALTCHOICE

## 2023-01-14 NOTE — PATIENT INSTRUCTIONS
Urine sent for culture to make sure he is not having any infection.  Gave Cipro 500 mg b.i.d. while awaiting urine culture.  Cystoscopy will be postponed and we will try do again next month.

## 2023-01-14 NOTE — PROCEDURES
Procedures    Mr. Figueroa was scheduled for cystoscopy today for follow-up of previous TCC urinary bladder.  He has been feeling bad recently.  Blood pressure was running very high last week when he was in pain clinic but blood pressure was down when he saw Dr. Gonzalez.  Lisinopril had been increased.  He can not tolerate diuretic because of urinary frequency.  Has had significant increase in his urinary frequency and nocturia recently.  He is not on Flomax.  He thought that made him worse.    Postvoiding residual today was 158 by bladder scan    Urine sent for culture to make sure he is not have any infection.  Gave Cipro 500 mg b.i.d. while awaiting urine culture.  Cystoscopy will be postponed and we will try do again next month.

## 2023-01-16 LAB — UA COMPLETE W REFLEX CULTURE PNL UR: ABNORMAL

## 2023-01-17 ENCOUNTER — TELEPHONE (OUTPATIENT)
Dept: UROLOGY | Facility: CLINIC | Age: 78
End: 2023-01-17
Payer: MEDICARE

## 2023-01-17 NOTE — TELEPHONE ENCOUNTER
I called and spoke to Mr. Figueroa to notify him of his positive urine culture. Urine grew >100,000 gram positive cocci. Isolate grew: Staph Epi sensitive to Cipro. Dr. Licea was going to prescribed Cipro (Ciprofloxacin) 500 mg, # 20 with no refill but Mr. Figueroa told me that he saw Dr. Gonzalez just before he saw Dr. Licea and Dr. Gonzalez thought he was having chronic prostatitis and prescribed Levaquin (Levofloxacin) 500 mg, # 30 one PO daily.  I told Dr. Licea what Mr. Figueroa told me and he was in agreement to let Mr. Figueroa continue the Levaquin 500 mg daily since the Staph Epi is sensitive to quinolones.    No new prescription was called in today.

## 2023-03-02 ENCOUNTER — PROCEDURE VISIT (OUTPATIENT)
Dept: UROLOGY | Facility: CLINIC | Age: 78
End: 2023-03-02
Payer: MEDICARE

## 2023-03-02 VITALS
HEIGHT: 70 IN | BODY MASS INDEX: 25.34 KG/M2 | WEIGHT: 177 LBS | SYSTOLIC BLOOD PRESSURE: 155 MMHG | HEART RATE: 57 BPM | DIASTOLIC BLOOD PRESSURE: 83 MMHG

## 2023-03-02 DIAGNOSIS — N39.0 URINARY TRACT INFECTION WITH HEMATURIA, SITE UNSPECIFIED: ICD-10-CM

## 2023-03-02 DIAGNOSIS — C67.9 PRIMARY MALIGNANT NEOPLASM OF BLADDER: Primary | ICD-10-CM

## 2023-03-02 DIAGNOSIS — R39.15 URINARY URGENCY: ICD-10-CM

## 2023-03-02 DIAGNOSIS — N32.89 CALCIFICATION OF BLADDER: ICD-10-CM

## 2023-03-02 DIAGNOSIS — R31.9 URINARY TRACT INFECTION WITH HEMATURIA, SITE UNSPECIFIED: ICD-10-CM

## 2023-03-02 PROCEDURE — 87086 URINE CULTURE/COLONY COUNT: CPT | Mod: ,,, | Performed by: CLINICAL MEDICAL LABORATORY

## 2023-03-02 PROCEDURE — 87086 CULTURE, URINE: ICD-10-PCS | Mod: ,,, | Performed by: CLINICAL MEDICAL LABORATORY

## 2023-03-02 PROCEDURE — 52000 CYSTOURETHROSCOPY: CPT | Mod: S$PBB,,, | Performed by: UROLOGY

## 2023-03-02 PROCEDURE — 52000 PR CYSTOURETHROSCOPY: ICD-10-PCS | Mod: S$PBB,,, | Performed by: UROLOGY

## 2023-03-02 PROCEDURE — 52000 CYSTOURETHROSCOPY: CPT | Mod: PBBFAC | Performed by: UROLOGY

## 2023-03-02 RX ORDER — LIDOCAINE HYDROCHLORIDE 20 MG/ML
10 JELLY TOPICAL
Status: COMPLETED | OUTPATIENT
Start: 2023-03-02 | End: 2023-03-02

## 2023-03-02 RX ADMIN — LIDOCAINE HYDROCHLORIDE 10 ML: 20 JELLY TOPICAL at 02:03

## 2023-03-02 NOTE — PATIENT INSTRUCTIONS
No recurrent tumors but extensive bladder calcification.  Patient given Cipro 500 mg b.i.d. for 3 days to cover instrumentation.  Urine sent for culture and we will treat specifically if there is infection.  Return for next cystoscopy on Tuesday September 5th at 1:30 p.m.

## 2023-03-02 NOTE — PROCEDURES
Procedures       Mr. Figueroa was scheduled for cystoscopy today for follow-up of previous TCC urinary bladder.  He has been feeling bad recently.  Blood pressure was running very high last week when he was in pain clinic but blood pressure was down when he saw Dr. Gonzalez.  Lisinopril had been increased.  He can not tolerate diuretic because of urinary frequency.  Has had significant increase in his urinary frequency and nocturia recently.  He is not on Flomax.  He thought that made him worse.     Postvoiding residual today was 158 by bladder scan     Urine sent for culture to make sure he is not have any infection.  Gave Cipro 500 mg b.i.d. while awaiting urine culture.  Cystoscopy will be postponed and we will try do again next month. [1/12/2023]          Flexible cystoscopy    Preoperative diagnosis:  Previous transitional cell carcinoma of urinary bladder    Postoperative diagnosis:  Same with no new lesions    Description:  The patient was placed in the supine position in the clinic cystoscopy room.  Urethra was anesthetized with lidocaine jelly.  No sedation was given.  The 16.5 [place] Olympus flexible digital cystoscope was passed transurethrally into the bladder.  Anterior urethra was clear.  Posterior urethra had early trilobar enlargement with partial obstruction present.  The bladder was entered and the area to the left of midline on posterior wall had a very calcified area that appeared to go well into the wall of the bladder and it appeared that this area of the bladder is mostly just calcium.  There was also a small area of calcification on the right posterior wall. I did not see any new lesions and do not feel there is any recurrent tumor.  Orifices were dilated and bladder neck appeared to have been extensively resected and was more like a normal bladder neck on the right side than the left that appeared to have been completely resected.  No other abnormalities were noted.  Scope was removed and  patient returned to regular exam room in satisfactory condition.  I think continued conservative treatment is indicated.  Bladder capacity is compromised from the extensive resection he has had.

## 2023-03-04 LAB — UA COMPLETE W REFLEX CULTURE PNL UR: NO GROWTH

## 2023-04-21 ENCOUNTER — OFFICE VISIT (OUTPATIENT)
Dept: DERMATOLOGY | Facility: CLINIC | Age: 78
End: 2023-04-21
Payer: MEDICARE

## 2023-04-21 DIAGNOSIS — L82.1 SEBORRHEIC KERATOSES: ICD-10-CM

## 2023-04-21 DIAGNOSIS — L57.0 ACTINIC KERATOSES: Primary | ICD-10-CM

## 2023-04-21 DIAGNOSIS — Z85.828 HISTORY OF NONMELANOMA SKIN CANCER: ICD-10-CM

## 2023-04-21 PROCEDURE — 17000 PR DESTRUCTION(LASER SURGERY,CRYOSURGERY,CHEMOSURGERY),PREMALIGNANT LESIONS,FIRST LESION: ICD-10-PCS | Mod: ,,, | Performed by: STUDENT IN AN ORGANIZED HEALTH CARE EDUCATION/TRAINING PROGRAM

## 2023-04-21 PROCEDURE — 99213 PR OFFICE/OUTPT VISIT, EST, LEVL III, 20-29 MIN: ICD-10-PCS | Mod: 25,,, | Performed by: STUDENT IN AN ORGANIZED HEALTH CARE EDUCATION/TRAINING PROGRAM

## 2023-04-21 PROCEDURE — 17003 DESTRUCT PREMALG LES 2-14: CPT | Mod: ,,, | Performed by: STUDENT IN AN ORGANIZED HEALTH CARE EDUCATION/TRAINING PROGRAM

## 2023-04-21 PROCEDURE — 17000 DESTRUCT PREMALG LESION: CPT | Mod: ,,, | Performed by: STUDENT IN AN ORGANIZED HEALTH CARE EDUCATION/TRAINING PROGRAM

## 2023-04-21 PROCEDURE — 99213 OFFICE O/P EST LOW 20 MIN: CPT | Mod: 25,,, | Performed by: STUDENT IN AN ORGANIZED HEALTH CARE EDUCATION/TRAINING PROGRAM

## 2023-04-21 PROCEDURE — 17003 DESTRUCTION, PREMALIGNANT LESIONS; SECOND THROUGH 14 LESIONS: ICD-10-PCS | Mod: ,,, | Performed by: STUDENT IN AN ORGANIZED HEALTH CARE EDUCATION/TRAINING PROGRAM

## 2023-04-21 NOTE — PROGRESS NOTES
[facility]  Scotty Kim MD    [address] [address]  [phone]    Fax: [phone]    Patient Name: Ruben Figueroa  Medical Record Number: [id number]  PCP: Jon Gonzalez MD  Age: 77 y.o. : 1945  Contact: [phone] (home)     History of Present Illness:     Ruben Figueroa is a 77 y.o.  male here for follow up of history of NMSC. Most recent BCC of R cheek and L leg treated with Mohs surgery . He has scaly lesions on his face today.     The patient has no other concerns today.    Review of Systems:     Unremarkable other than mentioned above.     Physical Exam:     General: Relaxed, oriented, alert    Skin examination of the scalp, face, neck, chest, back, abdomen, upper extremities and lower extremities were normal except for as listed below      Assessment and Plan:     1. History of NMSC   Well-healed scar on right cheek and left leg  No e/o recurrence   Recommend sunscreen and good photoprotection       2. Actinic Keratoses  Erythematous, scaly papules on the right cheek, left cheek, left eyebrow, right hand, left hand    Plan: Liquid Nitrogen.  A total of 9 lesions were treated with liquid nitrogen for 2 freeze-thaw cycles lasting 5 seconds, located on the above locations.   The patient's consent was obtained including but not limited to risks of crusting, scabbing,  blistering, scarring, darker or lighter pigmentary change, recurrence, incomplete removal and infection.    Counseling.  Sun protective clothing and broad spectrum sunscreen can prevent the formation of AK.   AKs can be treated with cryotherapy, photodynamic therapy, imiquimod, topical 5-FU.  Actinic Keratoses are precancerous proliferations that occur within sun damaged skin. If untreated,  a small subset of AKs can develop into Squamous Cell Carcinoma.    3. Seborrheic keratoses   - brown stuck on appearing papules/plaques  - patient educated on benign nature. No treatment  necessary unless they become irritated or inflamed       Return to clinic in 6 months    AVS printed with patient instructions     Scotty Kim MD   Mohs Surgery/Dermatologic Oncology  Dermatology

## 2023-05-09 DIAGNOSIS — Z71.89 COMPLEX CARE COORDINATION: ICD-10-CM

## 2023-05-22 ENCOUNTER — TELEPHONE (OUTPATIENT)
Dept: UROLOGY | Facility: CLINIC | Age: 78
End: 2023-05-22
Payer: MEDICARE

## 2023-05-22 ENCOUNTER — CLINICAL SUPPORT (OUTPATIENT)
Dept: UROLOGY | Facility: CLINIC | Age: 78
End: 2023-05-22
Payer: MEDICARE

## 2023-05-22 DIAGNOSIS — N39.0 URINARY TRACT INFECTION WITHOUT HEMATURIA, SITE UNSPECIFIED: Primary | ICD-10-CM

## 2023-05-22 PROCEDURE — 99212 OFFICE O/P EST SF 10 MIN: CPT | Mod: PBBFAC

## 2023-05-22 PROCEDURE — 87086 URINE CULTURE/COLONY COUNT: CPT | Mod: ,,, | Performed by: CLINICAL MEDICAL LABORATORY

## 2023-05-22 PROCEDURE — 87086 CULTURE, URINE: ICD-10-PCS | Mod: ,,, | Performed by: CLINICAL MEDICAL LABORATORY

## 2023-05-22 NOTE — TELEPHONE ENCOUNTER
I called pt back.  Informed him I spoke with Dr Licea's nurse, Ronaldo, and she said he can come in to drop off a urine specimen today.  She will check his chart to see when he was seen last and go from there.  Told pt to come up to 3rd floor clinic urology, and will get a cup to collect specimen when he gets here.  Informed  that he is coming.  Pt states he should be here in 30 to 45 minutes.

## 2023-05-22 NOTE — TELEPHONE ENCOUNTER
----- Message from Abi Singh sent at 2023  9:28 AM CDT -----  Having real frequent urination, every 15/20 mins. Does he need to do a urine sample or come in. Can come any time today. Call back # [phone]  I called pt and spoke with him.  He reports he is having urinary frequency, going about every 15 to 20 minutes.  If he drink a few drops of water or liquid, he has urgent need to go to the bathroom.  He denies any fever/chills, denies NV, denies blood in urine.  Says he has had bladder cancer in the past, and he sees Dr Licea ever so often, he does not remember how often, but does see him.  Says he is going out of town in few days for a , and would like to get this checked out before he leaves.  I told him I will relay this information to Dr Licea or his nurse, and someone will call him back one way or another.

## 2023-05-22 NOTE — PROGRESS NOTES
The patient came to the office to collect a urine specimen to be sent to clinic lab for culture. Symptoms: urinary frequency.  Cipro 500 mg  BID times three days was given pending lab result. Urine specimen sent to clinic lab for culture.

## 2023-05-24 LAB — UA COMPLETE W REFLEX CULTURE PNL UR: NO GROWTH

## 2023-06-09 ENCOUNTER — OFFICE VISIT (OUTPATIENT)
Dept: FAMILY MEDICINE | Facility: CLINIC | Age: 78
End: 2023-06-09
Payer: MEDICARE

## 2023-06-09 VITALS
SYSTOLIC BLOOD PRESSURE: 150 MMHG | HEIGHT: 70 IN | WEIGHT: 171 LBS | RESPIRATION RATE: 18 BRPM | OXYGEN SATURATION: 98 % | BODY MASS INDEX: 24.48 KG/M2 | DIASTOLIC BLOOD PRESSURE: 85 MMHG | HEART RATE: 52 BPM

## 2023-06-09 DIAGNOSIS — I10 ESSENTIAL HYPERTENSION: ICD-10-CM

## 2023-06-09 DIAGNOSIS — R39.14 BENIGN PROSTATIC HYPERPLASIA WITH INCOMPLETE BLADDER EMPTYING: Chronic | ICD-10-CM

## 2023-06-09 DIAGNOSIS — N40.1 BENIGN PROSTATIC HYPERPLASIA WITH INCOMPLETE BLADDER EMPTYING: Chronic | ICD-10-CM

## 2023-06-09 DIAGNOSIS — E78.2 MIXED HYPERLIPIDEMIA: Primary | ICD-10-CM

## 2023-06-09 PROCEDURE — 99214 PR OFFICE/OUTPT VISIT, EST, LEVL IV, 30-39 MIN: ICD-10-PCS | Mod: ,,, | Performed by: FAMILY MEDICINE

## 2023-06-09 PROCEDURE — 99214 OFFICE O/P EST MOD 30 MIN: CPT | Mod: ,,, | Performed by: FAMILY MEDICINE

## 2023-06-09 RX ORDER — AMLODIPINE BESYLATE 5 MG/1
5 TABLET ORAL DAILY
Qty: 90 TABLET | Refills: 3 | Status: SHIPPED | OUTPATIENT
Start: 2023-06-09 | End: 2023-06-09

## 2023-06-09 RX ORDER — AMLODIPINE BESYLATE 5 MG/1
5 TABLET ORAL DAILY
Qty: 90 TABLET | Refills: 3 | Status: ON HOLD | OUTPATIENT
Start: 2023-06-09 | End: 2023-10-04 | Stop reason: ALTCHOICE

## 2023-06-09 NOTE — PROGRESS NOTES
Jon Gonzalez MD        PATIENT NAME: Ruben Figueroa  : 1945  DATE: 23  MRN: [id number]      Billing Provider: Jon Gonzalez MD  Level of Service: NC OFFICE/OUTPT VISIT, EST, LEVL IV, 30-39 MIN  Patient PCP Information       Provider PCP Type    Jon Gonzalez MD General            Reason for Visit / Chief Complaint: Hypertension (Patient states his BP has been going very high and very low)       Update PCP  Update Chief Complaint         History of Present Illness / Problem Focused Workflow     Ruben Figueroa presents to the clinic with Hypertension (Patient states his BP has been going very high and very low)     Routine followup.  No significant interval change.  Stress at home.      Hypertension  Pertinent negatives include no chest pain, headaches, neck pain or palpitations.   Review of Systems     Review of Systems   Constitutional:  Negative for activity change, appetite change, fever and unexpected weight change.   HENT:  Negative for congestion, rhinorrhea, sinus pressure, sinus pain, sore throat and trouble swallowing.    Eyes:  Negative for photophobia, pain, discharge and visual disturbance.   Respiratory:  Negative for cough, chest tightness, wheezing and stridor.    Cardiovascular:  Negative for chest pain, palpitations and leg swelling.   Gastrointestinal:  Negative for abdominal pain, blood in stool, constipation, diarrhea and nausea.   Endocrine: Negative for polydipsia, polyphagia and polyuria.   Genitourinary:  Negative for difficulty urinating, flank pain and hematuria.   Musculoskeletal:  Negative for arthralgias and neck pain.   Skin:  Negative for rash.   Allergic/Immunologic: Negative for food allergies.   Neurological:  Negative for dizziness, tremors, seizures, syncope, weakness (global weakness) and headaches.   Psychiatric/Behavioral:  Negative for behavioral problems, confusion, decreased concentration, dysphoric mood and hallucinations. The patient is not  nervous/anxious.       Medical / Social / Family History     Past Medical History:   Diagnosis Date    BPH (benign prostatic hyperplasia)     Chronic prostatitis     Hyperlipidemia     Hypertension     Insomnia     Malignant neoplasm of bladder     Sciatica        Past Surgical History:   Procedure Laterality Date    BLADDER TUMOR EXCISION      skin cancer removed         Social History    reports that he quit smoking about 33 years ago. His smoking use included cigarettes. He started smoking about 63 years ago. He smoked an average of 1 pack per day. He has been exposed to tobacco smoke. He has never used smokeless tobacco. He reports current alcohol use. He reports that he does not use drugs.    Family History  's family history includes Bladder Cancer in his father; Heart disease in his father; Hypertension in his mother.    Medications and Allergies     Medications  No outpatient medications have been marked as taking for the 6/9/23 encounter (Office Visit) with Jon Gonzalez MD.       Allergies  Review of patient's allergies indicates:   Allergen Reactions    Doxycycline hyclate     Penicillins        Physical Examination     Vitals:    06/09/23 1118   BP: (!) 150/85   Pulse:    Resp:      Physical Exam  Constitutional:       General: He is not in acute distress.     Appearance: Normal appearance.   HENT:      Head: Normocephalic.      Right Ear: Tympanic membrane and ear canal normal.      Left Ear: Tympanic membrane and ear canal normal.      Nose: Nose normal.      Mouth/Throat:      Mouth: Mucous membranes are moist.      Pharynx: No oropharyngeal exudate.   Eyes:      Extraocular Movements: Extraocular movements intact.      Pupils: Pupils are equal, round, and reactive to light.   Cardiovascular:      Rate and Rhythm: Normal rate and regular rhythm.      Heart sounds: No murmur heard.  Pulmonary:      Effort: Pulmonary effort is normal.      Breath sounds: Normal breath sounds. No wheezing.    Abdominal:      General: Abdomen is flat. Bowel sounds are normal.      Palpations: Abdomen is soft.      Hernia: No hernia is present.   Musculoskeletal:         General: Normal range of motion.      Cervical back: Normal range of motion and neck supple.      Right lower leg: No edema.      Left lower leg: No edema.   Lymphadenopathy:      Cervical: No cervical adenopathy.   Skin:     General: Skin is warm and dry.      Coloration: Skin is not jaundiced.      Findings: No lesion.   Neurological:      General: No focal deficit present.      Mental Status: He is alert and oriented to person, place, and time.      Cranial Nerves: No cranial nerve deficit.      Gait: Gait normal.   Psychiatric:         Mood and Affect: Mood normal.         Behavior: Behavior normal.         Judgment: Judgment normal.        Assessment and Plan (including Health Maintenance)      Problem List  Smart Sets  Document Outside HM   :    Plan:     The current medical regimen is effective;  continue present plan and medications.      Health Maintenance Due   Topic Date Due    Hepatitis C Screening  Never done    TETANUS VACCINE  Never done    Shingles Vaccine (1 of 2) Never done       Problem List Items Addressed This Visit          Cardiac/Vascular    Hyperlipidemia - Primary       Renal/    BPH (benign prostatic hyperplasia) (Chronic)     Other Visit Diagnoses       Essential hypertension        Relevant Medications    amLODIPine (NORVASC) 5 MG tablet            Health Maintenance Topics with due status: Not Due       Topic Last Completion Date    Lipid Panel 12/28/2022    Influenza Vaccine Not Due       Future Appointments   Date Time Provider Department Center   6/23/2023  8:15 AM Jon Gonzalez MD [facility]   9/5/2023  1:30 PM Terrence Licea Jr., MD [facility]   10/24/2023  8:00 AM Charley Kim MD [facility]        There are no Patient Instructions on file for this visit.  Follow up in about  6 months (around 12/9/2023) for routine followup.     Signature:  Jon Gonzalez MD      Date of encounter: 6/9/23

## 2023-06-23 ENCOUNTER — OFFICE VISIT (OUTPATIENT)
Dept: FAMILY MEDICINE | Facility: CLINIC | Age: 78
End: 2023-06-23
Payer: MEDICARE

## 2023-06-23 VITALS
SYSTOLIC BLOOD PRESSURE: 150 MMHG | HEART RATE: 45 BPM | BODY MASS INDEX: 24.34 KG/M2 | TEMPERATURE: 98 F | WEIGHT: 170 LBS | HEIGHT: 70 IN | DIASTOLIC BLOOD PRESSURE: 80 MMHG | OXYGEN SATURATION: 100 % | RESPIRATION RATE: 16 BRPM

## 2023-06-23 DIAGNOSIS — I10 ESSENTIAL HYPERTENSION: ICD-10-CM

## 2023-06-23 DIAGNOSIS — R00.1 BRADYCARDIA: Primary | ICD-10-CM

## 2023-06-23 PROCEDURE — 99213 PR OFFICE/OUTPT VISIT, EST, LEVL III, 20-29 MIN: ICD-10-PCS | Mod: ,,, | Performed by: FAMILY MEDICINE

## 2023-06-23 PROCEDURE — 99213 OFFICE O/P EST LOW 20 MIN: CPT | Mod: ,,, | Performed by: FAMILY MEDICINE

## 2023-06-24 NOTE — PROGRESS NOTES
Jon Gonzalez MD        PATIENT NAME: Ruben Figueroa  : 1945  DATE: 23  MRN: [id number]      Billing Provider: Jon Gonzalez MD  Level of Service: [place] OFFICE/OUTPT VISIT, EST, LEVL III, 20-29 MIN  Patient PCP Information       Provider PCP Type    Jon Gonzalez MD General            Reason for Visit / Chief Complaint: Hypertension (2 week check on blood pressure)       Update PCP  Update Chief Complaint         History of Present Illness / Problem Focused Workflow     Ruben Figueroa presents to the clinic with Hypertension (2 week check on blood pressure)     Routine followup.  No significant interval change      Hypertension  Pertinent negatives include no chest pain, headaches, neck pain or palpitations.     Review of Systems     Review of Systems   Constitutional:  Negative for activity change, appetite change, fever and unexpected weight change.   HENT:  Negative for congestion, rhinorrhea, sinus pressure, sinus pain, sore throat and trouble swallowing.    Eyes:  Negative for photophobia, pain, discharge and visual disturbance.   Respiratory:  Negative for cough, chest tightness, wheezing and stridor.    Cardiovascular:  Negative for chest pain, palpitations and leg swelling.   Gastrointestinal:  Negative for abdominal pain, blood in stool, constipation, diarrhea and nausea.   Endocrine: Negative for polydipsia, polyphagia and polyuria.   Genitourinary:  Negative for difficulty urinating, flank pain and hematuria.   Musculoskeletal:  Negative for arthralgias and neck pain.   Skin:  Negative for rash.   Allergic/Immunologic: Negative for food allergies.   Neurological:  Negative for dizziness, tremors, seizures, syncope, weakness (global weakness) and headaches.   Psychiatric/Behavioral:  Negative for behavioral problems, confusion, decreased concentration, dysphoric mood and hallucinations. The patient is not nervous/anxious.       Medical / Social / Family History     Past Medical History:    Diagnosis Date    BPH (benign prostatic hyperplasia)     Chronic prostatitis     Hyperlipidemia     Hypertension     Insomnia     Malignant neoplasm of bladder     Sciatica        Past Surgical History:   Procedure Laterality Date    BLADDER TUMOR EXCISION      skin cancer removed         Social History    reports that he quit smoking about 33 years ago. His smoking use included cigarettes. He started smoking about 63 years ago. He smoked an average of 1 pack per day. He has been exposed to tobacco smoke. He has never used smokeless tobacco. He reports current alcohol use. He reports that he does not use drugs.    Family History  's family history includes Bladder Cancer in his father; Heart disease in his father; Hypertension in his mother.    Medications and Allergies     Medications  No outpatient medications have been marked as taking for the 6/23/23 encounter (Office Visit) with Jon Gonzalez MD.       Allergies  Review of patient's allergies indicates:   Allergen Reactions    Doxycycline hyclate     Penicillins        Physical Examination     Vitals:    06/23/23 0821   BP: (!) 150/80   Pulse: (!) 45   Resp: 16   Temp: 97.8 °F (36.6 °C)     Physical Exam  Constitutional:       General: He is not in acute distress.     Appearance: Normal appearance.   HENT:      Head: Normocephalic.      Right Ear: Tympanic membrane and ear canal normal.      Left Ear: Tympanic membrane and ear canal normal.      Nose: Nose normal.      Mouth/Throat:      Mouth: Mucous membranes are moist.      Pharynx: No oropharyngeal exudate.   Eyes:      Extraocular Movements: Extraocular movements intact.      Pupils: Pupils are equal, round, and reactive to light.   Cardiovascular:      Rate and Rhythm: Normal rate and regular rhythm.      Heart sounds: No murmur heard.  Pulmonary:      Effort: Pulmonary effort is normal.      Breath sounds: Normal breath sounds. No wheezing.   Abdominal:      General: Abdomen is flat. Bowel  sounds are normal.      Palpations: Abdomen is soft.      Hernia: No hernia is present.   Musculoskeletal:         General: Normal range of motion.      Cervical back: Normal range of motion and neck supple.      Right lower leg: No edema.      Left lower leg: No edema.   Lymphadenopathy:      Cervical: No cervical adenopathy.   Skin:     General: Skin is warm and dry.      Coloration: Skin is not jaundiced.      Findings: No lesion.   Neurological:      General: No focal deficit present.      Mental Status: He is alert and oriented to person, place, and time.      Cranial Nerves: No cranial nerve deficit.      Gait: Gait normal.   Psychiatric:         Mood and Affect: Mood normal.         Behavior: Behavior normal.         Judgment: Judgment normal.        Assessment and Plan (including Health Maintenance)      Problem List  Smart MomentFeed  Document Outside HM   :    Plan:     1. Bradycardia    -     Ambulatory referral/consult to Cardiology; Future; Expected date: 06/30/2023    2. Essential hypertension  The current medical regimen is effective;  continue present plan and medications.  -     Ambulatory referral/consult to Cardiology; Future; Expected date: 06/30/2023          Health Maintenance Due   Topic Date Due    Hepatitis C Screening  Never done    TETANUS VACCINE  Never done    Shingles Vaccine (1 of 2) Never done       Problem List Items Addressed This Visit    None  Visit Diagnoses       Bradycardia    -  Primary    Relevant Orders    Ambulatory referral/consult to Cardiology    Essential hypertension        Relevant Orders    Ambulatory referral/consult to Cardiology            Health Maintenance Topics with due status: Not Due       Topic Last Completion Date    Lipid Panel 12/28/2022    Influenza Vaccine Not Due       Future Appointments   Date Time Provider Department Center   8/31/2023  9:00 AM Shaheed Mckeon MD [facility]   9/5/2023  1:30 PM Terrence Licea Jr., MD [facility]    10/24/2023  8:00 AM Charley Kim MD [facility]        There are no Patient Instructions on file for this visit.  Follow up in about 6 months (around 12/23/2023) for routine followup.     Signature:  Jon Gonzalez MD      Date of encounter: 6/23/23

## 2023-06-26 DIAGNOSIS — R00.1 BRADYCARDIA: Primary | ICD-10-CM

## 2023-07-12 DIAGNOSIS — R00.1 BRADYCARDIA: Primary | ICD-10-CM

## 2023-07-18 ENCOUNTER — HOSPITAL ENCOUNTER (OUTPATIENT)
Dept: RADIOLOGY | Facility: HOSPITAL | Age: 78
Discharge: HOME | End: 2023-07-18
Attending: UROLOGY
Payer: MEDICARE

## 2023-07-18 ENCOUNTER — OFFICE VISIT (OUTPATIENT)
Dept: UROLOGY | Facility: CLINIC | Age: 78
End: 2023-07-18
Payer: MEDICARE

## 2023-07-18 VITALS
HEIGHT: 70 IN | WEIGHT: 175 LBS | HEART RATE: 53 BPM | SYSTOLIC BLOOD PRESSURE: 209 MMHG | DIASTOLIC BLOOD PRESSURE: 83 MMHG | BODY MASS INDEX: 25.05 KG/M2

## 2023-07-18 DIAGNOSIS — N21.0 BLADDER STONE: ICD-10-CM

## 2023-07-18 DIAGNOSIS — M62.838 MUSCLE SPASM: ICD-10-CM

## 2023-07-18 DIAGNOSIS — N32.89 CALCIFICATION OF BLADDER: ICD-10-CM

## 2023-07-18 DIAGNOSIS — N39.0 URINARY TRACT INFECTION WITHOUT HEMATURIA, SITE UNSPECIFIED: ICD-10-CM

## 2023-07-18 DIAGNOSIS — R10.9 RIGHT FLANK PAIN: Primary | ICD-10-CM

## 2023-07-18 DIAGNOSIS — C67.9 MALIGNANT NEOPLASM OF URINARY BLADDER, UNSPECIFIED SITE: ICD-10-CM

## 2023-07-18 PROCEDURE — 74018 XR KUB: ICD-10-PCS | Mod: 26,,, | Performed by: RADIOLOGY

## 2023-07-18 PROCEDURE — 99214 PR OFFICE/OUTPT VISIT, EST, LEVL IV, 30-39 MIN: ICD-10-PCS | Mod: S$PBB,,, | Performed by: UROLOGY

## 2023-07-18 PROCEDURE — 87086 URINE CULTURE/COLONY COUNT: CPT | Mod: ,,, | Performed by: CLINICAL MEDICAL LABORATORY

## 2023-07-18 PROCEDURE — 87086 CULTURE, URINE: ICD-10-PCS | Mod: ,,, | Performed by: CLINICAL MEDICAL LABORATORY

## 2023-07-18 PROCEDURE — 99214 OFFICE O/P EST MOD 30 MIN: CPT | Mod: S$PBB,,, | Performed by: UROLOGY

## 2023-07-18 PROCEDURE — 99214 OFFICE O/P EST MOD 30 MIN: CPT | Mod: PBBFAC | Performed by: UROLOGY

## 2023-07-18 PROCEDURE — 74018 RADEX ABDOMEN 1 VIEW: CPT | Mod: 26,,, | Performed by: RADIOLOGY

## 2023-07-18 PROCEDURE — 74018 RADEX ABDOMEN 1 VIEW: CPT | Mod: TC

## 2023-07-18 RX ORDER — CYCLOBENZAPRINE HCL 10 MG
10 TABLET ORAL 3 TIMES DAILY PRN
Qty: 30 TABLET | Refills: 2 | Status: SHIPPED | OUTPATIENT
Start: 2023-07-18 | End: 2023-07-28

## 2023-07-18 NOTE — PATIENT INSTRUCTIONS
KUB reviewed and I do not see any sign of stones.  He has a terrible looking back with a lot of chronic changes and that is probably contributing to his flank pain.  Calcification overlying the bladder noted and that is the area of known calcification probably.  He has a lot of calcification related to previous bladder tumor therapy.  Urine sent for culture.  We will treat specifically if there is infection.  Patient given Cipro 500 mg b.i.d. for 3 days while awaiting culture results.  Prescription for Flexeril given.  He will also try heat on the area.  Keep previously scheduled appointment or let me know if he has additional problems related to flank pain

## 2023-07-18 NOTE — PROGRESS NOTES
Subjective     Patient ID: Ruben Figueroa is a 77 y.o. male.    Chief Complaint: Other (Work in for right sided pain. )    Mr. Gordon called and asked to be seen today because of right flank pain.  He thought he might have a stone although has no history of previous stone problems.  He does have known calcifications in his bladder related to previous therapy for bladder cancer.  He has no history of injury and has not been lifting anything heavy.  He is not aware of any previous problem with muscle spasm but has had a bad back for many years.  Pain has being on about 3 days.  No increased urinary frequency and no major dysuria. [July 18, 2023]  Review of Systems       Objective     Physical Exam  Constitutional:       Appearance: Normal appearance. He is normal weight.   Abdominal:      Tenderness: There is right CVA tenderness.   Neurological:      Mental Status: He is alert.   Psychiatric:         Mood and Affect: Mood normal.         Behavior: Behavior normal.   Urinalysis revealed only occasional pus cells and red cells.  The dipstick had trace of leukocytes with pH of 6.0 and urine specific gravity 1.015     Assessment and Plan     1. Right flank pain    2. Bladder stone  -     X-Ray KUB; Future; Expected date: 07/18/2023    3. Urinary tract infection without hematuria, site unspecified  -     Urine culture; Future; Expected date: 07/18/2023    4. Muscle spasm    5. Calcification of bladder    6. Malignant neoplasm of urinary bladder, unspecified site    Other orders  -     cyclobenzaprine (FLEXERIL) 10 MG tablet; Take 1 tablet (10 mg total) by mouth 3 (three) times daily as needed for Muscle spasms.  Dispense: 30 tablet; Refill: 2        KUB reviewed and I do not see any sign of stones.  He has a terrible looking back with a lot of chronic changes and that is probably contributing to his flank pain.  Calcification overlying the bladder noted and that is the area of known calcification probably.  He has a lot  of calcification related to previous bladder tumor therapy.  Urine sent for culture.  We will treat specifically if there is infection.  Patient given Cipro 500 mg b.i.d. for 3 days while awaiting culture results.  Prescription for Flexeril given.  He will also try heat on the area.  Keep previously scheduled appointment or let me know if he has additional problems related to flank pain        No follow-ups on file.

## 2023-07-21 LAB — UA COMPLETE W REFLEX CULTURE PNL UR: NORMAL

## 2023-09-05 ENCOUNTER — PROCEDURE VISIT (OUTPATIENT)
Dept: UROLOGY | Facility: CLINIC | Age: 78
End: 2023-09-05
Payer: MEDICARE

## 2023-09-05 VITALS
HEART RATE: 58 BPM | WEIGHT: 175 LBS | HEIGHT: 70 IN | DIASTOLIC BLOOD PRESSURE: 78 MMHG | BODY MASS INDEX: 25.05 KG/M2 | SYSTOLIC BLOOD PRESSURE: 187 MMHG

## 2023-09-05 DIAGNOSIS — N32.89 CALCIFICATION OF BLADDER: ICD-10-CM

## 2023-09-05 DIAGNOSIS — Z01.818 PRE-OP TESTING: ICD-10-CM

## 2023-09-05 DIAGNOSIS — I10 ESSENTIAL HYPERTENSION: ICD-10-CM

## 2023-09-05 DIAGNOSIS — C67.9 MALIGNANT NEOPLASM OF URINARY BLADDER, UNSPECIFIED SITE: Primary | ICD-10-CM

## 2023-09-05 DIAGNOSIS — R35.0 URINARY FREQUENCY: ICD-10-CM

## 2023-09-05 DIAGNOSIS — C67.9 PRIMARY MALIGNANT NEOPLASM OF BLADDER: Primary | ICD-10-CM

## 2023-09-05 DIAGNOSIS — N39.0 URINARY TRACT INFECTION WITHOUT HEMATURIA, SITE UNSPECIFIED: ICD-10-CM

## 2023-09-05 PROCEDURE — 87086 CULTURE, URINE: ICD-10-PCS | Mod: ,,, | Performed by: CLINICAL MEDICAL LABORATORY

## 2023-09-05 PROCEDURE — 87086 URINE CULTURE/COLONY COUNT: CPT | Mod: ,,, | Performed by: CLINICAL MEDICAL LABORATORY

## 2023-09-05 PROCEDURE — 52000 PR CYSTOURETHROSCOPY: ICD-10-PCS | Mod: S$PBB,,, | Performed by: UROLOGY

## 2023-09-05 PROCEDURE — 52000 CYSTOURETHROSCOPY: CPT | Mod: S$PBB,,, | Performed by: UROLOGY

## 2023-09-05 PROCEDURE — 52000 CYSTOURETHROSCOPY: CPT | Mod: PBBFAC | Performed by: UROLOGY

## 2023-09-05 RX ORDER — HYDROCODONE BITARTRATE AND ACETAMINOPHEN 7.5; 325 MG/1; MG/1
1 TABLET ORAL 2 TIMES DAILY PRN
COMMUNITY
Start: 2023-07-06

## 2023-09-05 RX ORDER — LIDOCAINE HYDROCHLORIDE 20 MG/ML
JELLY TOPICAL
Status: COMPLETED | OUTPATIENT
Start: 2023-09-05 | End: 2023-09-05

## 2023-09-05 RX ORDER — VALSARTAN 160 MG/1
320 TABLET ORAL DAILY
COMMUNITY
Start: 2023-08-24

## 2023-09-05 RX ADMIN — LIDOCAINE HYDROCHLORIDE 10 ML: 20 JELLY TOPICAL at 04:09

## 2023-09-05 NOTE — PROCEDURES
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  Urethra was anesthetized with lidocaine jelly.  No sedation was given.  The 16.5 [place] Olympus flexible digital cystoscope was passed transurethrally into the bladder.  Anterior urethra was clear.  Posterior urethra was wide open.  Verumontanum was open and he was open all way through into the bladder and bladder neck had previously been resected.  Calcifications noted on the left lateral posterior bladder wall.  Between the calcified lesion of the left posterolateral wall there was a papillary lesion noted on the left lateral sidewall that appeared to be recurrent TCC.  It was a papillary lesion involving an area of about 2 cm.  Orifice appeared to be resected and I feel it was left orifice.  No tumors were noted anywhere else in the bladder.  Bladder capacity was limited.  Scope was removed patient returned to regular exam room in satisfactory condition.  Recommendation is for TURBT.

## 2023-09-06 DIAGNOSIS — C67.9 MALIGNANT NEOPLASM OF URINARY BLADDER, UNSPECIFIED SITE: Primary | ICD-10-CM

## 2023-09-06 NOTE — PATIENT INSTRUCTIONS
Patient has recurrent TCC involving left lateral wall of bladder.  Patient will have TURBT on Wednesday October 4.  Had recent EKG done by Dr. Kessler and we will get a copy of that report.  Patient to get CBC and CMP on October 2nd and we will review.  Urine sent for C&S today.  Patient given Cipro 500 mg b.i.d. for 3 days to cover instrumentation.

## 2023-09-06 NOTE — PROCEDURES
History of Present Illness  Mr. Figueroa is 69 years old. He has been treated on a couple of occasions in the past for prostatitis with lower tract irritative symptoms. He had problems with presumed prostatitis in May and saw Dr. Johns at that time. He was having problems with urinary frequency, urgency, and sensation of incomplete emptying. He has had nocturia 3 times nightly in recent past whereas usually has nocturia only one or 2 times nightly which he considers normal. He took a round of Cipro in May and subsequently took a round of doxycycline that he could not complete because of photosensitivity reaction and irritation and blisters on his hands and arms. He had normal renal function with BUN 19 and creatinine 1.1. He had a normal PSA in February that was 0.523. He has continued to have lower tract irritative symptoms and has microscopic hematuria. He has not had any gross hematuria. He has no history of full-blown urinary tract infections nor does have a history of stones, hematuria, or any other voiding problems other than the previous treatment for prostatitis. He takes Flomax on an irregular basis. He thinks he voids a little better when he takes it but not dramatically better. He has not tried taking it very regularly.   Patient quit smoking in 1994. Recent normal liver and renal function studies. No imaging studies and has never had cystoscopy or seen another urologist, [September 8, 2015]    Mr. Figueroa returns today for his workup for urinary frequency and microscopic hematuria. He has not seen any blood since the last visit. He has had minimal help with his symptoms taking the Flomax on a regular basis. He has mild burning on urination but mostly just still has frequency of urination. [September 29, 2015]    Mr. Figueroa underwent transurethral resection of bladder tumors on October 8; he had a high-grade lesion of the posterior wall without invasion. He may need BCG therapy but I think he needs to  have one more cystoscopy first , probably repeat TURBT, before we decide to do that. He is voiding well and had no visible blood in his urine since about 2 weeks postop. Basically feeling normal now with minimal dysuria.  [November 9, 2015]    Mr. Schwartz returned today to start BCG therapy. He had transurethral removal of some more bladder tumors on January 12. Initial TURBT was October 8, 2015. Recent lesions removed were reported as WHO grade 2/3. No muscle invasion noted but there is a high probability of recurrence without additional therapy. Patient ready to proceed with BCG as we had previously discussed. No significant dysuria.  [March 1, 2016]  ------------------------------------------------------------------------------------------  --------------------------------------------------------------------------------------------  Mr. Schwartz has had a reasonably good week with no complications from the BCG last week. Excellent week golfing as he had a hole in one Saturday. Returns for the last treatment in current boost series. [December 20, 2016]    The above are Some of the previous notes on mr. schwartz. he has had 12 treatments with bcg. he had cystoscopy with biopsies of the bladder lesions on march 29. the report revealed no recurrent or residual tcc. he is doing okay clinically now with minimal dysuria and minimal bleeding following the last treatment. ready to proceed with a last round of BCG therapy.  [April 26, 2017]    Mr. Schwartz returns for another BCG today. After his last treatment he had fever  2 weeks ago. He had no fever for over 24 hours before it started. Temperature was 100.2 and he had chills. It is uncertain if it was related to BCG or not. Feeling fine today. We will continue him on his full strength treatment. No other problems and the last urine culture was negative. [May 10, 2017]    Mr. Schwartz returned today for his last BCG. No fever or problems after the last BCG treatment. He has had  nocturia only one time nightly recently and so is not having any significant irritative symptoms or obstructive symptoms. Generally feeling well. [May 24, 2017]    Mr. Figueroa returned for follow-up cystoscopy today. He completed his BCG therapy 3 months ago. Doing very well with minimal frequency. He has nocturia once or twice nightly typically. Continues to be very active. Missed shooting his age playing golf earlier today by one-shot. No new health issues.  [August 30, 2017]    Mr. Figueroa is seen in the clinic for the first time in over a year. He is doing okay clinically with no signs or symptoms of UTIs. Had no voiding problems and no visible blood in the urine. He's had no new health problems.    Mr. Figueroa returns today for follow-up cystoscopy. Doing well clinically with no major trouble with irritative symptoms and feels like he is doing perfectly normal. No new health issues.  [December 6, 2017]    Mr. Figueroa is in for preoperative evaluation in anticipation of having cystoscopy under anesthesia with bladder biopsies on Wednesday, March 7. He has multiple calcifications over old tumor sites where he's had previous tumors removed. He has had no trouble voiding and has nocturia usually one time nightly but occasionally x2. Overall had a good 3 months.  [March 5, 2018]    Mr. Figueroa is in for postop follow-up after the repeat TURBT and bladder biopsies done on March 7. The final pathology report revealed no residual cancer. Clinically doing well without any dysuria. Nocturia only one time nightly usually. Basically back to normal.  [April 11, 2018]    Mr. Figueroa is in for first cystoscopy since he had follow-up in April after the bladder biopsy done in March that revealed no recurrent or residual TCC. Clinically doing well with no dysuria, visible blood in the urine, or any other difficulty from  standpoint. [September 13, 2018]    Mr. Figueroa is in for follow-up cystoscopy. He is doing well clinically.  Bladder outlet obstructive and irritative symptoms have improved and he has nocturia no more than one time nightly now. He had his PSA last month and it is perfectly normal at 0.275.  [January 24, 2019]    Mr. Figueroa is in for follow-up cystoscopy. Clinically doing well and not having any visible blood in his urine or any new trouble with urination.  [May 20, 2019]    Mr. Figueroa is in for cystoscopy. He's had a good 4 months without any new health issues. No visible blood etc. [September 30, 2019]    Mr. Figueroa is in for his cystoscopic examination and follow-up of previous TCC urinary bladder. No problems during the last 4 months. He has had less frequency. He had for a while actually. Nocturia typically only 0-1 time nightly. [February 5, 2020]    Mr. Figueroa returns today for cystoscopy for follow-up of previous TCCs urinary bladder. We believe the last procedure he had done in the hospital was March, 2018. He has had no visible blood in his urine has had no new symptoms or problems from  standpoint. He has lost 9 pounds. Had some family stress and also has had change in dietary habits.  [July 29, 2020]    Mr. Figueroa was in today for follow-up of TCC urinary bladder. He underwent removal of lesions from the bladder and biopsies on August 19. Pathology report revealed that he had only CIS present with no invasion and did not have any papillary lesions present. He presented to clinic on  September 9 because of continued irritative symptoms and we did a culture on him that was negative. He was notified of that. He continues to have marked urinary frequency which is not improved any although it has been over a month since his procedure. He voids about every 2 to 2-1/2 hours and seems to be worse at night than daytime. He has not had any major pain. No visible blood in some time.  [September 23, 2020]    Mr. Figueroa returned today to start a series of BCG instillations. He had his 15 treatments that were completed in  May 2017 and has not had any additional BCG since that time. He had recurrent TCC of urinary bladder this year so we plan on starting another round of BCG and he is here today to start that. No new problems otherwise.  [October 26, 2020]    Mr. Figueroa had no problems after his BCG last week. He did have dysuria for one day or so but generally back to normal now. Ready to proceed with next BCG instillation. [November 2, 2020]    Mr. Figueroa rescheduled the appointment he had the last Monday for today to complete current round of BCG therapy. He did have some irritation after the last treatment but not terrible. It lasted 2 or 3 days. He did pass a clot this morning after having frequency and urgency during the night last night but no symptoms of infection otherwise. Ready to proceed with BCG instillation which will be his 18th treatment. [November 16, 2020]  ---------------------------------------------------------------------------------------------------------------------------------------------------------------  Above notes are from the old electronic medical record system.  First tumor today patient has had in some time.      Component 3 yr ago   Specimen Submitted A. Recurrent papillary TCC of urinary bladder    Diagnosis A. Urinary bladder lesion, biopsies:    - Urothelial carcinoma in situ    - There is no evidence of invasion               Last path specimen that showed carcinoma in Situ was 3 years ago      Physical exam revealed a well thin white male of stated age 77 in no acute distress.  HEENT within normal limits  Neck supple without lymphadenopathy or carotid bruits.  Heart regular rhythm without appreciable murmur.  Lungs clear to auscultation  Abdomen soft without organomegaly or tenderness  Genitalia that of a normal adult male  Extremities within normal limits  Neurological grossly physiologic

## 2023-09-08 LAB — UA COMPLETE W REFLEX CULTURE PNL UR: NO GROWTH

## 2023-10-03 DIAGNOSIS — C67.9 MALIGNANT NEOPLASM OF URINARY BLADDER, UNSPECIFIED SITE: Primary | ICD-10-CM

## 2023-10-03 RX ORDER — SODIUM CHLORIDE, SODIUM LACTATE, POTASSIUM CHLORIDE, CALCIUM CHLORIDE 600; 310; 30; 20 MG/100ML; MG/100ML; MG/100ML; MG/100ML
INJECTION, SOLUTION INTRAVENOUS CONTINUOUS
Status: CANCELLED | OUTPATIENT
Start: 2023-10-04

## 2023-10-04 ENCOUNTER — ANESTHESIA (OUTPATIENT)
Dept: SURGERY | Facility: HOSPITAL | Age: 78
End: 2023-10-04
Payer: MEDICARE

## 2023-10-04 ENCOUNTER — ANESTHESIA EVENT (OUTPATIENT)
Dept: SURGERY | Facility: HOSPITAL | Age: 78
End: 2023-10-04
Payer: MEDICARE

## 2023-10-04 ENCOUNTER — HOSPITAL ENCOUNTER (OUTPATIENT)
Facility: HOSPITAL | Age: 78
Discharge: HOME | End: 2023-10-04
Attending: UROLOGY | Admitting: UROLOGY
Payer: MEDICARE

## 2023-10-04 VITALS
BODY MASS INDEX: 25.05 KG/M2 | OXYGEN SATURATION: 96 % | DIASTOLIC BLOOD PRESSURE: 56 MMHG | SYSTOLIC BLOOD PRESSURE: 126 MMHG | HEART RATE: 42 BPM | WEIGHT: 175 LBS | TEMPERATURE: 98 F | HEIGHT: 70 IN | RESPIRATION RATE: 16 BRPM

## 2023-10-04 DIAGNOSIS — C67.9 MALIGNANT NEOPLASM OF URINARY BLADDER, UNSPECIFIED SITE: ICD-10-CM

## 2023-10-04 DIAGNOSIS — Z01.810 PREOP CARDIOVASCULAR EXAM: ICD-10-CM

## 2023-10-04 PROCEDURE — 88305 TISSUE EXAM BY PATHOLOGIST: CPT | Mod: TC,SUR | Performed by: UROLOGY

## 2023-10-04 PROCEDURE — 88311 DECALCIFY TISSUE: CPT | Mod: 26,,, | Performed by: PATHOLOGY

## 2023-10-04 PROCEDURE — 37000009 HC ANESTHESIA EA ADD 15 MINS: Performed by: UROLOGY

## 2023-10-04 PROCEDURE — 25000003 PHARM REV CODE 250: Performed by: UROLOGY

## 2023-10-04 PROCEDURE — 88305 SURGICAL PATHOLOGY: ICD-10-PCS | Mod: 26,,, | Performed by: PATHOLOGY

## 2023-10-04 PROCEDURE — 52234 PR CYSTOURETHROSCOPY,FULGUR 0.5-2 CM LESN: ICD-10-PCS | Mod: ,,, | Performed by: UROLOGY

## 2023-10-04 PROCEDURE — 71000033 HC RECOVERY, INTIAL HOUR: Performed by: UROLOGY

## 2023-10-04 PROCEDURE — D9220A PRA ANESTHESIA: ICD-10-PCS | Mod: ANES,,, | Performed by: ANESTHESIOLOGY

## 2023-10-04 PROCEDURE — 25000003 PHARM REV CODE 250: Performed by: NURSE ANESTHETIST, CERTIFIED REGISTERED

## 2023-10-04 PROCEDURE — 63600175 PHARM REV CODE 636 W HCPCS: Performed by: ANESTHESIOLOGY

## 2023-10-04 PROCEDURE — 52234 CYSTOSCOPY AND TREATMENT: CPT | Mod: ,,, | Performed by: UROLOGY

## 2023-10-04 PROCEDURE — 63600175 PHARM REV CODE 636 W HCPCS: Performed by: NURSE ANESTHETIST, CERTIFIED REGISTERED

## 2023-10-04 PROCEDURE — 71000016 HC POSTOP RECOV ADDL HR: Performed by: UROLOGY

## 2023-10-04 PROCEDURE — 93005 ELECTROCARDIOGRAM TRACING: CPT | Mod: 59

## 2023-10-04 PROCEDURE — 63600175 PHARM REV CODE 636 W HCPCS: Performed by: UROLOGY

## 2023-10-04 PROCEDURE — D9220A PRA ANESTHESIA: Mod: CRNA,,, | Performed by: NURSE ANESTHETIST, CERTIFIED REGISTERED

## 2023-10-04 PROCEDURE — 71000015 HC POSTOP RECOV 1ST HR: Performed by: UROLOGY

## 2023-10-04 PROCEDURE — 36000706: Performed by: UROLOGY

## 2023-10-04 PROCEDURE — 88311 SURGICAL PATHOLOGY: ICD-10-PCS | Mod: 26,,, | Performed by: PATHOLOGY

## 2023-10-04 PROCEDURE — D9220A PRA ANESTHESIA: ICD-10-PCS | Mod: CRNA,,, | Performed by: NURSE ANESTHETIST, CERTIFIED REGISTERED

## 2023-10-04 PROCEDURE — 37000008 HC ANESTHESIA 1ST 15 MINUTES: Performed by: UROLOGY

## 2023-10-04 PROCEDURE — 36000707: Performed by: UROLOGY

## 2023-10-04 PROCEDURE — 93010 EKG 12-LEAD: ICD-10-PCS | Mod: ,,, | Performed by: STUDENT IN AN ORGANIZED HEALTH CARE EDUCATION/TRAINING PROGRAM

## 2023-10-04 PROCEDURE — 93010 ELECTROCARDIOGRAM REPORT: CPT | Mod: ,,, | Performed by: STUDENT IN AN ORGANIZED HEALTH CARE EDUCATION/TRAINING PROGRAM

## 2023-10-04 PROCEDURE — 88305 TISSUE EXAM BY PATHOLOGIST: CPT | Mod: 26,,, | Performed by: PATHOLOGY

## 2023-10-04 PROCEDURE — D9220A PRA ANESTHESIA: Mod: ANES,,, | Performed by: ANESTHESIOLOGY

## 2023-10-04 RX ORDER — ACETAMINOPHEN 10 MG/ML
1000 INJECTION, SOLUTION INTRAVENOUS EVERY 8 HOURS
Status: DISCONTINUED | OUTPATIENT
Start: 2023-10-04 | End: 2023-10-04 | Stop reason: HOSPADM

## 2023-10-04 RX ORDER — ATROPINE SULFATE 0.4 MG/ML
INJECTION, SOLUTION ENDOTRACHEAL; INTRAMEDULLARY; INTRAMUSCULAR; INTRAVENOUS; SUBCUTANEOUS
Status: DISCONTINUED | OUTPATIENT
Start: 2023-10-04 | End: 2023-10-04

## 2023-10-04 RX ORDER — HYDROMORPHONE HYDROCHLORIDE 2 MG/ML
0.5 INJECTION, SOLUTION INTRAMUSCULAR; INTRAVENOUS; SUBCUTANEOUS EVERY 5 MIN PRN
Status: DISCONTINUED | OUTPATIENT
Start: 2023-10-04 | End: 2023-10-04 | Stop reason: HOSPADM

## 2023-10-04 RX ORDER — LIDOCAINE HYDROCHLORIDE 20 MG/ML
INJECTION, SOLUTION EPIDURAL; INFILTRATION; INTRACAUDAL; PERINEURAL
Status: DISCONTINUED | OUTPATIENT
Start: 2023-10-04 | End: 2023-10-04

## 2023-10-04 RX ORDER — EPHEDRINE SULFATE 50 MG/ML
INJECTION, SOLUTION INTRAVENOUS
Status: DISCONTINUED | OUTPATIENT
Start: 2023-10-04 | End: 2023-10-04

## 2023-10-04 RX ORDER — OXYCODONE HYDROCHLORIDE 5 MG/1
10 TABLET ORAL ONCE
Status: COMPLETED | OUTPATIENT
Start: 2023-10-04 | End: 2023-10-04

## 2023-10-04 RX ORDER — OXYCODONE HYDROCHLORIDE 5 MG/1
5 TABLET ORAL EVERY 4 HOURS PRN
Status: CANCELLED | OUTPATIENT
Start: 2023-10-04

## 2023-10-04 RX ORDER — LABETALOL HYDROCHLORIDE 5 MG/ML
5 INJECTION, SOLUTION INTRAVENOUS ONCE
Status: COMPLETED | OUTPATIENT
Start: 2023-10-04 | End: 2023-10-04

## 2023-10-04 RX ORDER — SODIUM CHLORIDE, SODIUM LACTATE, POTASSIUM CHLORIDE, CALCIUM CHLORIDE 600; 310; 30; 20 MG/100ML; MG/100ML; MG/100ML; MG/100ML
INJECTION, SOLUTION INTRAVENOUS CONTINUOUS
Status: DISCONTINUED | OUTPATIENT
Start: 2023-10-04 | End: 2023-10-04 | Stop reason: HOSPADM

## 2023-10-04 RX ORDER — DIPHENHYDRAMINE HYDROCHLORIDE 50 MG/ML
25 INJECTION INTRAMUSCULAR; INTRAVENOUS EVERY 6 HOURS PRN
Status: DISCONTINUED | OUTPATIENT
Start: 2023-10-04 | End: 2023-10-04 | Stop reason: HOSPADM

## 2023-10-04 RX ORDER — ONDANSETRON 2 MG/ML
INJECTION INTRAMUSCULAR; INTRAVENOUS
Status: DISCONTINUED | OUTPATIENT
Start: 2023-10-04 | End: 2023-10-04

## 2023-10-04 RX ORDER — CLONIDINE HYDROCHLORIDE 0.1 MG/1
0.1 TABLET ORAL EVERY 4 HOURS PRN
COMMUNITY

## 2023-10-04 RX ORDER — MEPERIDINE HYDROCHLORIDE 25 MG/ML
25 INJECTION INTRAMUSCULAR; INTRAVENOUS; SUBCUTANEOUS EVERY 10 MIN PRN
Status: DISCONTINUED | OUTPATIENT
Start: 2023-10-04 | End: 2023-10-04 | Stop reason: HOSPADM

## 2023-10-04 RX ORDER — MORPHINE SULFATE 10 MG/ML
4 INJECTION INTRAMUSCULAR; INTRAVENOUS; SUBCUTANEOUS EVERY 5 MIN PRN
Status: DISCONTINUED | OUTPATIENT
Start: 2023-10-04 | End: 2023-10-04 | Stop reason: HOSPADM

## 2023-10-04 RX ORDER — FENTANYL CITRATE 50 UG/ML
INJECTION, SOLUTION INTRAMUSCULAR; INTRAVENOUS
Status: DISCONTINUED | OUTPATIENT
Start: 2023-10-04 | End: 2023-10-04

## 2023-10-04 RX ORDER — MEPERIDINE HYDROCHLORIDE 50 MG/ML
25 INJECTION INTRAMUSCULAR; INTRAVENOUS; SUBCUTANEOUS ONCE
Status: DISCONTINUED | OUTPATIENT
Start: 2023-10-04 | End: 2023-10-04 | Stop reason: HOSPADM

## 2023-10-04 RX ORDER — CIPROFLOXACIN 2 MG/ML
400 INJECTION, SOLUTION INTRAVENOUS ONCE
Status: COMPLETED | OUTPATIENT
Start: 2023-10-04 | End: 2023-10-04

## 2023-10-04 RX ORDER — ONDANSETRON 2 MG/ML
4 INJECTION INTRAMUSCULAR; INTRAVENOUS DAILY PRN
Status: DISCONTINUED | OUTPATIENT
Start: 2023-10-04 | End: 2023-10-04 | Stop reason: HOSPADM

## 2023-10-04 RX ORDER — PROPOFOL 10 MG/ML
VIAL (ML) INTRAVENOUS
Status: DISCONTINUED | OUTPATIENT
Start: 2023-10-04 | End: 2023-10-04

## 2023-10-04 RX ADMIN — EPHEDRINE SULFATE 25 MG: 50 INJECTION INTRAVENOUS at 09:10

## 2023-10-04 RX ADMIN — EPHEDRINE SULFATE 20 MG: 50 INJECTION INTRAVENOUS at 09:10

## 2023-10-04 RX ADMIN — LIDOCAINE HYDROCHLORIDE 80 MG: 20 INJECTION, SOLUTION INTRAVENOUS at 09:10

## 2023-10-04 RX ADMIN — MORPHINE SULFATE 4 MG: 10 INJECTION, SOLUTION INTRAMUSCULAR; INTRAVENOUS at 11:10

## 2023-10-04 RX ADMIN — SODIUM CHLORIDE, POTASSIUM CHLORIDE, SODIUM LACTATE AND CALCIUM CHLORIDE: 600; 310; 30; 20 INJECTION, SOLUTION INTRAVENOUS at 09:10

## 2023-10-04 RX ADMIN — LABETALOL HYDROCHLORIDE 5 MG: 5 INJECTION, SOLUTION INTRAVENOUS at 12:10

## 2023-10-04 RX ADMIN — PROPOFOL 150 MG: 10 INJECTION, EMULSION INTRAVENOUS at 09:10

## 2023-10-04 RX ADMIN — SODIUM CHLORIDE, POTASSIUM CHLORIDE, SODIUM LACTATE AND CALCIUM CHLORIDE: 600; 310; 30; 20 INJECTION, SOLUTION INTRAVENOUS at 11:10

## 2023-10-04 RX ADMIN — HYDROMORPHONE HYDROCHLORIDE 0.5 MG: 2 INJECTION, SOLUTION INTRAMUSCULAR; INTRAVENOUS; SUBCUTANEOUS at 11:10

## 2023-10-04 RX ADMIN — EPHEDRINE SULFATE 5 MG: 50 INJECTION INTRAVENOUS at 09:10

## 2023-10-04 RX ADMIN — OXYCODONE HYDROCHLORIDE 10 MG: 5 TABLET ORAL at 11:10

## 2023-10-04 RX ADMIN — ONDANSETRON 4 MG: 2 INJECTION INTRAMUSCULAR; INTRAVENOUS at 09:10

## 2023-10-04 RX ADMIN — ACETAMINOPHEN 1000 MG: 10 INJECTION, SOLUTION INTRAVENOUS at 01:10

## 2023-10-04 RX ADMIN — CIPROFLOXACIN 400 MG: 2 INJECTION, SOLUTION INTRAVENOUS at 09:10

## 2023-10-04 RX ADMIN — MORPHINE SULFATE 2 MG: 10 INJECTION, SOLUTION INTRAMUSCULAR; INTRAVENOUS at 11:10

## 2023-10-04 RX ADMIN — ATROPINE SULFATE 0.4 MG: 0.4 INJECTION, SOLUTION INTRAMUSCULAR; INTRAVENOUS; SUBCUTANEOUS at 09:10

## 2023-10-04 RX ADMIN — MEPERIDINE HYDROCHLORIDE 25 MG: 25 INJECTION INTRAMUSCULAR; INTRAVENOUS; SUBCUTANEOUS at 12:10

## 2023-10-04 RX ADMIN — FENTANYL CITRATE 100 MCG: 50 INJECTION INTRAMUSCULAR; INTRAVENOUS at 09:10

## 2023-10-04 NOTE — TRANSFER OF CARE
"Anesthesia Transfer of Care Note    Patient: Ruben Figueroa    Procedure(s) Performed: Procedure(s) (LRB):  TURBT (TRANSURETHRAL RESECTION OF BLADDER TUMOR) (N/A)    Patient location: PACU    Anesthesia Type: general    Transport from OR: Transported from OR on room air with adequate spontaneous ventilation    Post pain: adequate analgesia    Post assessment: no apparent anesthetic complications and tolerated procedure well    Post vital signs: stable    Level of consciousness: responds to stimulation    Nausea/Vomiting: no nausea/vomiting    Complications: none    Transfer of care protocol was followedComments: Report Given to PACU rn VSS      Last vitals:   Visit Vitals  BP (!) 202/101 (BP Location: Right arm, Patient Position: Lying)   Pulse 74   Temp 36.4 °C (97.6 °F) (Oral)   Resp 15   Ht 5' 10" (1.778 m)   Wt 79.4 kg (175 lb)   SpO2 97%   BMI 25.11 kg/m²     "

## 2023-10-04 NOTE — OP NOTE
[facility] - Periop Services  General Surgery  Operative Note    SUMMARY     Date of Procedure: 10/4/2023     Procedure: Procedure(s) (LRB):  TURBT (TRANSURETHRAL RESECTION OF BLADDER TUMOR) (N/A)       Surgeon(s) and Role:     * Terrence Licea Jr., MD - Primary    Assisting Surgeon: None    Pre-Operative Diagnosis: Malignant neoplasm of urinary bladder, unspecified site [C67.9]    Post-Operative Diagnosis: Post-Op Diagnosis Codes:     * Malignant neoplasm of urinary bladder, unspecified site [C67.9]    Anesthesia: General LMA    Operative Findings (including complications, if any):     Description of Technical Procedures:   The patient was taken to the hospital endoscopy suite.  Satisfactory general LMA was introduced.  He was placed in the dorsal lithotomy position and prepared for transurethral surgery.  The 22F Storz rigid cystoscope was passed transurethrally into the bladder using the QRcao camera system for viewing.  The anterior urethra was clear.  Post urethra had a very small prostate that was nonobstructing in the bladder neck appeared to have been resected probably as part of the bladder tumor removals in the past.  Inspection of the bladder revealed papillary tumors on the left side of the bladder.  The left orifice was patulous but I never really saw the right orifice.  Lesions were on the left side of the bladder and I could easily avoid the left ureter.  There was extensive calcification with growth into the wall of the calcified tissue.  There were papillary lesions scattered amongst the calcified areas of the bladder wall and there appeared to be pseudo diverticula where the calcifications went into the wall.  The cold cup biopsy forceps were used to remove the majority of the tumor that appeared papillary again this was on the left lateral sidewall and the posterolateral wall.  Biopsies were made and submitted for pathology.  Some of the calcifications were also included with the  tumor.  Lesion was 1-2 cm in size.  Specimen was submitted for pathology.  Extensive fulguration of the tumor area was carried out with the Bugbee electrode.  We were able to destroy all remaining tumor that was visible.  The NBI light was used and I did not see any additional lesions other than the calcifications.  Scope was removed.  A 20 [place] silicone Coronel was left indwelling with 10 cc water in the balloon.  The patient tolerated procedure well and left for the PACU in satisfactory condition.  Blood loss was felt to be less than 10 mL.    Significant Surgical Tasks Conducted by the Assistant(s), if Applicable:     Estimated Blood Loss (EBL): 10 mL           Implants: * No implants in log *    Specimens:   Specimen (24h ago, onward)       Start     Ordered    10/04/23 0947  Surgical Pathology  RELEASE UPON ORDERING         10/04/23 0947                            Condition: Stable    Disposition: PACU - hemodynamically stable.    Attestation: I was present and scrubbed for the entire procedure.

## 2023-10-04 NOTE — OR NURSING
1045 Rec'd pt to PACU drowsy but arousable to verbal stimuli. No signs of distress noted, respirations even and unlabored. Temp 95.8, all other VSS. Marianne hugger attached to pt. Leg bag secure and intact with no kinks or obstructions noted. No needs. Will continue to monitor.     1102 Pt c/o pain 8/10. IV morphine given, see MAR for admin.     1120 Pt states pain unrelieved by morphine. IV dilaudid given, see MAR for admin.     1138 Out of PACU. VSS. No signs of bleeding/distress noted. Temp 97.4 oral.     1140 Pt to ASC 4 awake and alert with no distress noted, respirations even and unlabored. Family at bedside. Bedside report given to KILLIAN Gaines RN. Leg bag chris secure and intact with no kinks or obstructions noted. C/o pain, denies other needs. /96, P 50, R 14, O2 98% RA.

## 2023-10-04 NOTE — ANESTHESIA PREPROCEDURE EVALUATION
10/04/2023  Ruben Figueroa is a 77 y.o., male.      Pre-op Assessment    I have reviewed the Patient Summary Reports.    I have reviewed the NPO Status.   I have reviewed the Medications.     Review of Systems         Anesthesia Plan  Type of Anesthesia, risks & benefits discussed:    Anesthesia Type: Gen Supraglottic Airway  Intra-op Monitoring Plan: Standard ASA Monitors  Post Op Pain Control Plan: IV/PO Opioids PRN  Induction:  IV  Informed Consent: Informed consent signed with the Patient and all parties understand the risks and agree with anesthesia plan.  All questions answered.   ASA Score: 2    Ready For Surgery From Anesthesia Perspective.     .  NPO greater than 8 hours  NAC  Allergies      Doxycycline Hyclate Doxycycline Hyclate  Not Specified  3/22/2021   Deletion Reason:    Penicillins    10/4/23 EKG: Marked sinus bradycardia; 40 bpm; ST & T wave abnormality         Medical History   Malignant neoplasm of bladder Hypertension   BPH (benign prostatic hyperplasia) Chronic prostatitis   Hyperlipidemia Insomnia   Sciatica    Bradycardia    Airway exam deferred (COVID precautions); adequate ROM at neck.

## 2023-10-04 NOTE — DISCHARGE INSTRUCTIONS
FOLLOW UP WITH 'S NURSE PRACTITIONER:  -Lab Follow up tomorrow at 11:00   -Tuesday Oct.11th at 3:00pm- Cardiology Appointment    Pull Chris tomorrow morning if urine is still clear. If urine is blood tinge leave in for another day. Keep chris bad emptied. If unable to urinate after chris removed, call Dr. Licea's office immediately.     Dr. Licea will call you by Friday afternoon with biopsy results and will make a follow up after that phone call.

## 2023-10-04 NOTE — H&P
History of Present Illness  Mr. Figueroa is 69 years old. He has been treated on a couple of occasions in the past for prostatitis with lower tract irritative symptoms. He had problems with presumed prostatitis in May and saw Dr. Johns at that time. He was having problems with urinary frequency, urgency, and sensation of incomplete emptying. He has had nocturia 3 times nightly in recent past whereas usually has nocturia only one or 2 times nightly which he considers normal. He took a round of Cipro in May and subsequently took a round of doxycycline that he could not complete because of photosensitivity reaction and irritation and blisters on his hands and arms. He had normal renal function with BUN 19 and creatinine 1.1. He had a normal PSA in February that was 0.523. He has continued to have lower tract irritative symptoms and has microscopic hematuria. He has not had any gross hematuria. He has no history of full-blown urinary tract infections nor does have a history of stones, hematuria, or any other voiding problems other than the previous treatment for prostatitis. He takes Flomax on an irregular basis. He thinks he voids a little better when he takes it but not dramatically better. He has not tried taking it very regularly.   Patient quit smoking in 1994. Recent normal liver and renal function studies. No imaging studies and has never had cystoscopy or seen another urologist, [September 8, 2015]     Mr. Figueroa returns today for his workup for urinary frequency and microscopic hematuria. He has not seen any blood since the last visit. He has had minimal help with his symptoms taking the Flomax on a regular basis. He has mild burning on urination but mostly just still has frequency of urination. [September 29, 2015]     Mr. Figueroa underwent transurethral resection of bladder tumors on October 8; he had a high-grade lesion of the posterior wall without invasion. He may need BCG therapy but I think he needs to  have one more cystoscopy first , probably repeat TURBT, before we decide to do that. He is voiding well and had no visible blood in his urine since about 2 weeks postop. Basically feeling normal now with minimal dysuria.  [November 9, 2015]     Mr. Schwartz returned today to start BCG therapy. He had transurethral removal of some more bladder tumors on January 12. Initial TURBT was October 8, 2015. Recent lesions removed were reported as WHO grade 2/3. No muscle invasion noted but there is a high probability of recurrence without additional therapy. Patient ready to proceed with BCG as we had previously discussed. No significant dysuria.  [March 1, 2016]  ------------------------------------------------------------------------------------------  --------------------------------------------------------------------------------------------  Mr. Schwartz has had a reasonably good week with no complications from the BCG last week. Excellent week golfing as he had a hole in one Saturday. Returns for the last treatment in current boost series. [December 20, 2016]     The above are Some of the previous notes on mr. schwartz. he has had 12 treatments with bcg. he had cystoscopy with biopsies of the bladder lesions on march 29. the report revealed no recurrent or residual tcc. he is doing okay clinically now with minimal dysuria and minimal bleeding following the last treatment. ready to proceed with a last round of BCG therapy.  [April 26, 2017]     Mr. Schwartz returns for another BCG today. After his last treatment he had fever  2 weeks ago. He had no fever for over 24 hours before it started. Temperature was 100.2 and he had chills. It is uncertain if it was related to BCG or not. Feeling fine today. We will continue him on his full strength treatment. No other problems and the last urine culture was negative. [May 10, 2017]     Mr. Schwartz returned today for his last BCG. No fever or problems after the last BCG treatment. He has  had nocturia only one time nightly recently and so is not having any significant irritative symptoms or obstructive symptoms. Generally feeling well. [May 24, 2017]     Mr. Figueroa returned for follow-up cystoscopy today. He completed his BCG therapy 3 months ago. Doing very well with minimal frequency. He has nocturia once or twice nightly typically. Continues to be very active. Missed shooting his age playing golf earlier today by one-shot. No new health issues.  [August 30, 2017]     Mr. Figueroa is seen in the clinic for the first time in over a year. He is doing okay clinically with no signs or symptoms of UTIs. Had no voiding problems and no visible blood in the urine. He's had no new health problems.     Mr. Figueroa returns today for follow-up cystoscopy. Doing well clinically with no major trouble with irritative symptoms and feels like he is doing perfectly normal. No new health issues.  [December 6, 2017]     Mr. Figueroa is in for preoperative evaluation in anticipation of having cystoscopy under anesthesia with bladder biopsies on Wednesday, March 7. He has multiple calcifications over old tumor sites where he's had previous tumors removed. He has had no trouble voiding and has nocturia usually one time nightly but occasionally x2. Overall had a good 3 months.  [March 5, 2018]     Mr. Figueroa is in for postop follow-up after the repeat TURBT and bladder biopsies done on March 7. The final pathology report revealed no residual cancer. Clinically doing well without any dysuria. Nocturia only one time nightly usually. Basically back to normal.  [April 11, 2018]     Mr. Figueroa is in for first cystoscopy since he had follow-up in April after the bladder biopsy done in March that revealed no recurrent or residual TCC. Clinically doing well with no dysuria, visible blood in the urine, or any other difficulty from  standpoint. [September 13, 2018]     Mr. Figueroa is in for follow-up cystoscopy. He is doing well  clinically. Bladder outlet obstructive and irritative symptoms have improved and he has nocturia no more than one time nightly now. He had his PSA last month and it is perfectly normal at 0.275.  [January 24, 2019]     Mr. Figueroa is in for follow-up cystoscopy. Clinically doing well and not having any visible blood in his urine or any new trouble with urination.  [May 20, 2019]     Mr. Figueroa is in for cystoscopy. He's had a good 4 months without any new health issues. No visible blood etc. [September 30, 2019]     Mr. Figueroa is in for his cystoscopic examination and follow-up of previous TCC urinary bladder. No problems during the last 4 months. He has had less frequency. He had for a while actually. Nocturia typically only 0-1 time nightly. [February 5, 2020]     Mr. Figueroa returns today for cystoscopy for follow-up of previous TCCs urinary bladder. We believe the last procedure he had done in the hospital was March, 2018. He has had no visible blood in his urine has had no new symptoms or problems from  standpoint. He has lost 9 pounds. Had some family stress and also has had change in dietary habits.  [July 29, 2020]     Mr. Figueroa was in today for follow-up of TCC urinary bladder. He underwent removal of lesions from the bladder and biopsies on August 19. Pathology report revealed that he had only CIS present with no invasion and did not have any papillary lesions present. He presented to clinic on  September 9 because of continued irritative symptoms and we did a culture on him that was negative. He was notified of that. He continues to have marked urinary frequency which is not improved any although it has been over a month since his procedure. He voids about every 2 to 2-1/2 hours and seems to be worse at night than daytime. He has not had any major pain. No visible blood in some time.  [September 23, 2020]     Mr. Figueroa returned today to start a series of BCG instillations. He had his 15 treatments that  were completed in May 2017 and has not had any additional BCG since that time. He had recurrent TCC of urinary bladder this year so we plan on starting another round of BCG and he is here today to start that. No new problems otherwise.  [October 26, 2020]     Mr. Figueroa had no problems after his BCG last week. He did have dysuria for one day or so but generally back to normal now. Ready to proceed with next BCG instillation. [November 2, 2020]     Mr. Figueroa rescheduled the appointment he had the last Monday for today to complete current round of BCG therapy. He did have some irritation after the last treatment but not terrible. It lasted 2 or 3 days. He did pass a clot this morning after having frequency and urgency during the night last night but no symptoms of infection otherwise. Ready to proceed with BCG instillation which will be his 18th treatment. [November 16, 2020]  ---------------------------------------------------------------------------------------------------------------------------------------------------------------  Above notes are from the old electronic medical record system.  First tumor today patient has had in some time.        Component 3 yr ago   Specimen Submitted A. Recurrent papillary TCC of urinary bladder    Diagnosis A. Urinary bladder lesion, biopsies:    - Urothelial carcinoma in situ    - There is no evidence of invasion               Last path specimen that showed carcinoma in Situ was 3 years ago        Physical exam revealed a well thin white male of stated age 77 in no acute distress.  HEENT within normal limits  Neck supple without lymphadenopathy or carotid bruits.  Heart regular rhythm without appreciable murmur.  Lungs clear to auscultation  Abdomen soft without organomegaly or tenderness  Genitalia that of a normal adult male  Extremities within normal limits  Neurological grossly physiologic                Procedures  Terrence Licea Jr., MD (Physician)    Urology  Procedures     Flexible cystoscopy     Preoperative diagnosis:  Previous transitional cell carcinoma of urinary bladder; for bladder tumor recheck     Postoperative diagnosis:  Recurrent TCC of left lateral bladder wall     Description:  The patient was taken to the clinic cystoscopy room and prepared for flexible cystoscopy.  Urethra was anesthetized with lidocaine jelly.  No sedation was given.  The 16.5 Pashto Olympus flexible digital cystoscope was passed transurethrally into the bladder.  Anterior urethra was clear.  Posterior urethra was wide open.  Verumontanum was open and he was open all way through into the bladder and bladder neck had previously been resected.  Calcifications noted on the left lateral posterior bladder wall.  Between the calcified lesion of the left posterolateral wall there was a papillary lesion noted on the left lateral sidewall that appeared to be recurrent TCC.  It was a papillary lesion involving an area of about 2 cm.  Orifice appeared to be resected and I feel it was left orifice.  No tumors were noted anywhere else in the bladder.  Bladder capacity was limited.  Scope was removed patient returned to regular exam room in satisfactory condition.  Recommendation is for TURBT.        There are no changes to the above note except patient's heart rate is 40 this morning.  Heart sounds okay with slow rate.  Only change to his medications is increase of valsartan from 160 mg to 320 mg daily.  He is on no beta-blockers but has had a slow heart rate his whole life, normally 48-52.

## 2023-10-04 NOTE — ANESTHESIA PROCEDURE NOTES
Intubation    Date/Time: 10/4/2023 9:16 AM    Performed by: Edgar Morgan CRNA  Authorized by: Juan Daniels MD    Intubation:     Induction:  Intravenous    Intubated:  Postinduction    Mask Ventilation:  Easy mask    Attempts:  1    Attempted By:  CRNA    Difficult Airway Encountered?: No      Complications:  None    Airway Device:  Supraglottic airway/LMA    Airway Device Size:  4.0    Style/Cuff Inflation:  Cuffed (inflated to minimal occlusive pressure)    Placement Verified By:  Capnometry    Complicating Factors:  None    Findings Post-Intubation:  BS equal bilateral and atraumatic/condition of teeth unchanged

## 2023-10-06 ENCOUNTER — TELEPHONE (OUTPATIENT)
Dept: UROLOGY | Facility: CLINIC | Age: 78
End: 2023-10-06
Payer: MEDICARE

## 2023-10-06 LAB
DHEA SERPL-MCNC: NORMAL
ESTROGEN SERPL-MCNC: NORMAL PG/ML
INSULIN SERPL-ACNC: NORMAL U[IU]/ML
LAB AP GROSS DESCRIPTION: NORMAL
LAB AP LABORATORY NOTES: NORMAL
T3RU NFR SERPL: NORMAL %

## 2023-10-06 NOTE — TELEPHONE ENCOUNTER
Final Diagnosis   A. 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  Dr. Campos agrees with the interpretation.   Gross Description    A. Bladder, bladder tumor biopsies:   The specimen is received in formalin as bladder-bladder tumor biopsies and consists of multiple hemorrhagic-tan and white biopsy fragments ranging from 0.1-0.5 cm greatest dimension, entirely submitted in block A1.  Additionally, within the same container, are multiple, firm, tan and white calcified fragments in aggregate measuring 1.3 x 1.2 x 0.5 cm, entirely submitted in block A2 following decalcification.     Telephone call to patient about pathology report.  Pathology report as above.  He does not have any invasive cancer but does have a significant malignancy and it is a grade II/III which classifies as high-grade.  Extensive calcification as expected..  Patient advised of pathology report.  He removed his catheter without problem yesterday.  He is having marked urinary frequency which is not surprising.  We will see him at scheduled appointment and probably plan on a repeat cysto UNDER ANESTHESIA next time.

## 2023-10-06 NOTE — ANESTHESIA POSTPROCEDURE EVALUATION
Anesthesia Post Evaluation    Patient: Ruben Figueroa    Procedure(s) Performed: Procedure(s) (LRB):  TURBT (TRANSURETHRAL RESECTION OF BLADDER TUMOR) (N/A)    Final Anesthesia Type: general      Patient location during evaluation: PACU  Post-procedure vital signs: reviewed and stable  Pain management: adequate  Airway patency: patent    PONV status at discharge: No PONV  Anesthetic complications: no      Cardiovascular status: hemodynamically stable  Respiratory status: unassisted  Hydration status: euvolemic  Follow-up not needed.          Vitals Value Taken Time   /56 10/04/23 1457   Temp 36.4 °C (97.6 °F) 10/04/23 1053   Pulse 46 10/04/23 1411   Resp 16 10/04/23 1249   SpO2 98 % 10/04/23 1411   Vitals shown include unvalidated device data.      Event Time   Out of Recovery 11:38:00         Pain/Matias Score: No data recorded

## 2023-10-09 NOTE — DISCHARGE SUMMARY
Procedure:  Flexible cystoscopy    Preoperative diagnosis:  Previous transitional cell carcinoma of urinary bladder; for bladder tumor recheck    Postoperative diagnosis:  Recurrent TCC of left lateral bladder wall    Description:  The patient was taken to the clinic cystoscopy room and prepared for flexible cystoscopy.  Urethra was anesthetized with lidocaine jelly.  No sedation was given.  The 16.5 Serbian Olympus flexible digital cystoscope was passed transurethrally into the bladder.  Anterior urethra was clear.  Posterior urethra was wide open.  Verumontanum was open and he was open all way through into the bladder and bladder neck had previously been resected.  Calcifications noted on the left lateral posterior bladder wall.  Between the calcified lesion of the left posterolateral wall there was a papillary lesion noted on the left lateral sidewall that appeared to be recurrent TCC.  It was a papillary lesion involving an area of about 2 cm.  Orifice appeared to be resected and I feel it was left orifice.  No tumors were noted anywhere else in the bladder.  Bladder capacity was limited.  Scope was removed patient returned to regular exam room in satisfactory condition.  Recommendation is for TURBT.    Physical exam revealed a well thin white male of stated age 77 in no acute distress.  HEENT within normal limits  Neck supple without lymphadenopathy or carotid bruits.  Heart regular rhythm without appreciable murmur.  Lungs clear to auscultation  Abdomen soft without organomegaly or tenderness  Genitalia that of a normal adult male  Extremities within normal limits  Neurological grossly physiologic  [September 5, 2023]    There are no changes to the above note except patient's heart rate is 40 this morning.  Heart sounds okay with slow rate.  Only change to his medications is increase of Valsartan from 160 mg to 320 mg daily.  He is on no beta-blockers but has had a slow heart rate his whole life, normally  48-52.  ---------------------------------------------------------------------------------------------  The above note is from Dr. Licea's last clinic visit with Mr. Figueroa for repeat cystoscopy for bladder tumor recheck on September 5, 2023.    Mr. Figueroa was admitted to outpatient surgery department of [facility] morning of Wednesday, October 4, 2023. He was taken to operative suite # 4 where he underwent Cystoscopy with TUR-BT under general LMA anesthesia by Dr. Licea (please see his operative procedure note for complete details). He tolerated the procedure well and was awakened and taken to PACU in stable condition.  While in PACU, he received a total of 1 mg of Hydromorphone IV titrated over a 10 minute period for pain and 10 mg of Morphine IV titrated over a 15 minute period for pain.  After PACU he was taken back to ASC.  While in ASC, he required an additional 25 mg of Meperidine IV for pain.  His pain was finally relieved.  After all the pain medicine he had an uneventful recovery requiring no additional post-op pain medications. A 20 French Chris catheter was left indwelling which he will remove in the morning (Mr. Figueroa has removed his chris catheter in the past and said he is comfortable removing his catheter at home). Urine in the leg bag is clear to pink tinged.  NO blood clots seen.    Mr. Figueroa is feeling better.  He is awake, alert and requesting to go home.  Patient was discharged to home in good condition.     was reviewed on Mr. Figueroa and his last prescription for a narcotic was written by nurse practitioner Felisha Foote on April 6, 2023 for Norco (Hydrocodone) 7.5/325 mg # 60 with no refill.    Dr. Licea wrote the following prescriptions for Mr. Figueroa:    Prescription # 1:  Percocet (Oxycodone) 5/325 mg, # 16 with no refill.  Patient may take 1 tablet by mouth every 4 hours as needed for severe pain.    Prescription # 2:  Phenergan (Promethazine) 25 mg, # 16 with no refill.   "Patient may take 1 tablet by mouth every 4 - 6 hours as needed for nausea and vomiting.    Mr. Figueroa has a post-op return appointment with Dr. Licea on Wednesday, November 15, 2023 at 1:45 p.m.      BRIDGER Moody, CNOR,  [facility]  ----------------------------------------------------------------------------------------------  Below is Mr. Figueroa's pathology report:    [facility]  [address] [place] [id number]  Ruben Figueroa [id number]  M, 77 yrs, 1945  [address] [place] [id number]  H: [phone] M: [phone]    Surgical Pathology (Final result) [id number]  Authorizing Provider: Terrence Licea Jr., MD  Ordering Provider: Terrence Licea Jr., MD  Ordering Location: [facility]  Collected: 10/04/2023 09:47 AM  Pathologist: Abraham Benson MD  Received: 10/04/2023 10:59 AM    Specimens:  A.  Bladder, bladder tumor biopsies    Final Diagnosis  A. 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 Dr. Campos agrees with the interpretation.    Gross Description:  A. Bladder, bladder tumor biopsies:  The specimen is received in formalin as "bladder-bladder tumor biopsies" and consists of multiple hemorrhagic-tan and white biopsy fragments ranging from 0.1-0.5 cm greatest dimension, entirely submitted in block A1. Additionally, within the same container, are multiple, firm, tan and white calcified fragments in aggregate measuring 1.3 x 1.2 x 0.5 cm, entirely submitted in block A2 following decalcification.    Grossing was completed by Jose Cota.    Microscopic Description;  A microscopic examination was performed " and the diagnosis reflects the findings.    Electronically signed by Abraham Benson MD on 10/6/2023 at 9:44 AM

## 2023-10-24 ENCOUNTER — OFFICE VISIT (OUTPATIENT)
Dept: DERMATOLOGY | Facility: CLINIC | Age: 78
End: 2023-10-24
Payer: MEDICARE

## 2023-10-24 DIAGNOSIS — L57.0 ACTINIC KERATOSES: Primary | ICD-10-CM

## 2023-10-24 DIAGNOSIS — Z85.828 HISTORY OF NONMELANOMA SKIN CANCER: ICD-10-CM

## 2023-10-24 DIAGNOSIS — L82.1 SEBORRHEIC KERATOSES: ICD-10-CM

## 2023-10-24 PROCEDURE — 17003 DESTRUCT PREMALG LES 2-14: CPT | Mod: ,,, | Performed by: STUDENT IN AN ORGANIZED HEALTH CARE EDUCATION/TRAINING PROGRAM

## 2023-10-24 PROCEDURE — 17000 PR DESTRUCTION(LASER SURGERY,CRYOSURGERY,CHEMOSURGERY),PREMALIGNANT LESIONS,FIRST LESION: ICD-10-PCS | Mod: ,,, | Performed by: STUDENT IN AN ORGANIZED HEALTH CARE EDUCATION/TRAINING PROGRAM

## 2023-10-24 PROCEDURE — 17000 DESTRUCT PREMALG LESION: CPT | Mod: ,,, | Performed by: STUDENT IN AN ORGANIZED HEALTH CARE EDUCATION/TRAINING PROGRAM

## 2023-10-24 PROCEDURE — 17003 DESTRUCTION, PREMALIGNANT LESIONS; SECOND THROUGH 14 LESIONS: ICD-10-PCS | Mod: ,,, | Performed by: STUDENT IN AN ORGANIZED HEALTH CARE EDUCATION/TRAINING PROGRAM

## 2023-10-24 PROCEDURE — 99213 OFFICE O/P EST LOW 20 MIN: CPT | Mod: 25,,, | Performed by: STUDENT IN AN ORGANIZED HEALTH CARE EDUCATION/TRAINING PROGRAM

## 2023-10-24 PROCEDURE — 99213 PR OFFICE/OUTPT VISIT, EST, LEVL III, 20-29 MIN: ICD-10-PCS | Mod: 25,,, | Performed by: STUDENT IN AN ORGANIZED HEALTH CARE EDUCATION/TRAINING PROGRAM

## 2023-10-24 NOTE — PROGRESS NOTES
[facility]  Scotty Kim MD    [address] [place] [address]  [phone]    Fax: [phone]    Patient Name: Ruben Figueroa  Medical Record Number: [id number]  PCP: Jon Gonzalez MD  Age: 78 y.o. : 1945  Contact: [phone] (home)     History of Present Illness:     Ruben Figueroa is a 78 y.o.  male here for follow up of history of NMSC. Most recent BCC of R cheek and L leg treated with Mohs surgery . Denies any new growing lesions.     The patient has no other concerns today.    Review of Systems:     Unremarkable other than mentioned above.     Physical Exam:     General: Relaxed, oriented, alert    Skin examination of the scalp, face, neck, chest, back, abdomen, upper extremities and lower extremities were normal except for as listed below      Assessment and Plan:     1. History of NMSC   Well-healed scar on R cheek and L leg  No e/o recurrence   Recommend sunscreen and good photoprotection       2. Actinic Keratoses  Erythematous, scaly papules on right ear, right hand, left hand    Plan: Liquid Nitrogen.  A total of 3 lesions were treated with liquid nitrogen for 2 freeze-thaw cycles lasting 5 seconds, located on the above locations.   The patient's consent was obtained including but not limited to risks of crusting, scabbing,  blistering, scarring, darker or lighter pigmentary change, recurrence, incomplete removal and infection.    Counseling.  Sun protective clothing and broad spectrum sunscreen can prevent the formation of AK.   AKs can be treated with cryotherapy, photodynamic therapy, imiquimod, topical 5-FU.  Actinic Keratoses are precancerous proliferations that occur within sun damaged skin. If untreated,  a small subset of AKs can develop into Squamous Cell Carcinoma.    3. Seborrheic keratoses   - brown stuck on appearing papules/plaques  - patient educated on benign nature. No treatment necessary unless they become irritated or  inflamed       Return to clinic in 1 year    AVS printed with patient instructions     Scotty Kim MD   Mohs Surgery/Dermatologic Oncology  Dermatology

## 2023-10-30 ENCOUNTER — CLINICAL SUPPORT (OUTPATIENT)
Dept: UROLOGY | Facility: CLINIC | Age: 78
End: 2023-10-30
Payer: MEDICARE

## 2023-10-30 DIAGNOSIS — N39.0 URINARY TRACT INFECTION WITHOUT HEMATURIA, SITE UNSPECIFIED: Primary | ICD-10-CM

## 2023-10-30 PROCEDURE — 99212 OFFICE O/P EST SF 10 MIN: CPT | Mod: PBBFAC

## 2023-10-30 PROCEDURE — 87086 URINE CULTURE/COLONY COUNT: CPT | Mod: ,,, | Performed by: CLINICAL MEDICAL LABORATORY

## 2023-10-30 PROCEDURE — 87086 CULTURE, URINE: ICD-10-PCS | Mod: ,,, | Performed by: CLINICAL MEDICAL LABORATORY

## 2023-10-30 NOTE — PROGRESS NOTES
Patient came by to leave a urine specimen, states he feels he has a UTI and would like to have it checked.   No meds given pending results. Urine sent for culture.

## 2023-11-01 ENCOUNTER — TELEPHONE (OUTPATIENT)
Dept: UROLOGY | Facility: HOSPITAL | Age: 78
End: 2023-11-01
Payer: MEDICARE

## 2023-11-01 LAB — UA COMPLETE W REFLEX CULTURE PNL UR: NORMAL

## 2023-11-01 NOTE — TELEPHONE ENCOUNTER
Telephone call to patient about urine culture.  Urine culture grew only skin ines.  He was advised he does not need antimicrobials for that.  He is having marked urinary frequency as well as irritative symptoms.  I am sure that is due to his small bladder capacity related to all the tumor removals he has had.  I suggested he try some Prelief and see if that will help.  He was also advised that there was a change in his EKG.  He has seen Dr. Kessler recent past.  He has had some trouble with his blood pressure getting under control.  No chest pain etc..  He was advised that the delayed reading by Cardiology suggested there was a change in EKG compared to last year.

## 2023-12-09 DIAGNOSIS — Z71.89 COMPLEX CARE COORDINATION: ICD-10-CM

## 2023-12-13 DIAGNOSIS — F32.A DEPRESSION, UNSPECIFIED DEPRESSION TYPE: ICD-10-CM

## 2023-12-13 RX ORDER — TRAZODONE HYDROCHLORIDE 100 MG/1
100 TABLET ORAL NIGHTLY
Qty: 90 TABLET | Refills: 3 | Status: SHIPPED | OUTPATIENT
Start: 2023-12-13

## 2023-12-14 ENCOUNTER — OFFICE VISIT (OUTPATIENT)
Dept: UROLOGY | Facility: CLINIC | Age: 78
End: 2023-12-14
Payer: MEDICARE

## 2023-12-14 VITALS
DIASTOLIC BLOOD PRESSURE: 67 MMHG | HEART RATE: 80 BPM | SYSTOLIC BLOOD PRESSURE: 129 MMHG | HEIGHT: 70 IN | WEIGHT: 178 LBS | BODY MASS INDEX: 25.48 KG/M2

## 2023-12-14 DIAGNOSIS — C67.9 PRIMARY MALIGNANT NEOPLASM OF BLADDER: Primary | ICD-10-CM

## 2023-12-14 DIAGNOSIS — N32.89 CALCIFICATION OF BLADDER: ICD-10-CM

## 2023-12-14 DIAGNOSIS — I10 ESSENTIAL HYPERTENSION: ICD-10-CM

## 2023-12-14 DIAGNOSIS — Z09 POSTOP CHECK: ICD-10-CM

## 2023-12-14 DIAGNOSIS — R35.0 URINARY FREQUENCY: ICD-10-CM

## 2023-12-14 PROCEDURE — 99214 OFFICE O/P EST MOD 30 MIN: CPT | Mod: PBBFAC | Performed by: UROLOGY

## 2023-12-14 PROCEDURE — 99024 POSTOP FOLLOW-UP VISIT: CPT | Mod: ,,, | Performed by: UROLOGY

## 2023-12-14 PROCEDURE — 99024 PR POST-OP FOLLOW-UP VISIT: ICD-10-PCS | Mod: ,,, | Performed by: UROLOGY

## 2023-12-14 NOTE — PROGRESS NOTES
Subjective     Patient ID: Ruben Figueroa is a 78 y.o. male.    Chief Complaint: Post-op Evaluation (One month post op TURBT on 10/4/23)    Description:  The patient was taken to the clinic cystoscopy room and prepared for flexible cystoscopy.  Urethra was anesthetized with lidocaine jelly.  No sedation was given.  The 16.5 Greek Olympus flexible digital cystoscope was passed transurethrally into the bladder.  Anterior urethra was clear.  Posterior urethra was wide open.  Verumontanum was open and he was open all way through into the bladder and bladder neck had previously been resected.  Calcifications noted on the left lateral posterior bladder wall.  Between the calcified lesion of the left posterolateral wall there was a papillary lesion noted on the left lateral sidewall that appeared to be recurrent TCC.  It was a papillary lesion involving an area of about 2 cm.  Orifice appeared to be resected and I feel it was left orifice.  No tumors were noted anywhere else in the bladder.  Bladder capacity was limited.  Scope was removed patient returned to regular exam room in satisfactory condition.  Recommendation is for TURBT.     Physical exam revealed a well thin white male of stated age 77 in no acute distress.  HEENT within normal limits  Neck supple without lymphadenopathy or carotid bruits.  Heart regular rhythm without appreciable murmur.  Lungs clear to auscultation  Abdomen soft without organomegaly or tenderness  Genitalia that of a normal adult male  Extremities within normal limits  Neurological grossly physiologic  [September 5, 2023]     There are no changes to the above note except patient's heart rate is 40 this morning.  Heart sounds okay with slow rate.  Only change to his medications is increase of Valsartan from 160 mg to 320 mg daily.  He is on no beta-blockers but has had a slow heart rate his whole life, normally  48-52.  ---------------------------------------------------------------------------------------------  The above note is from Dr. Licea's last clinic visit with Mr. Figueroa for repeat cystoscopy for bladder tumor recheck on September 5, 2023.     Mr. Figueroa was admitted to outpatient surgery department of [facility] morning of Wednesday, October 4, 2023. He was taken to operative suite # 4 where he underwent Cystoscopy with TUR-BT under general LMA anesthesia by Dr. Licea (please see his operative procedure note for complete details). He tolerated the procedure well and was awakened and taken to PACU in stable condition.  While in PACU, he received a total of 1 mg of Hydromorphone IV titrated over a 10 minute period for pain and 10 mg of Morphine IV titrated over a 15 minute period for pain.  After PACU he was taken back to ASC.  While in ASC, he required an additional 25 mg of Meperidine IV for pain.  His pain was finally relieved.  After all the pain medicine he had an uneventful recovery requiring no additional post-op pain medications. A 20 French Chris catheter was left indwelling which he will remove in the morning (Mr. Figueroa has removed his chris catheter in the past and said he is comfortable removing his catheter at home). Urine in the leg bag is clear to pink tinged.  NO blood clots seen.     Mr. Figueroa is feeling better.  He is awake, alert and requesting to go home.  Patient was discharged to home in good condition.      was reviewed on Mr. Figueroa and his last prescription for a narcotic was written by nurse practitioner Felisha Foote on April 6, 2023 for Norco (Hydrocodone) 7.5/325 mg # 60 with no refill.     Dr. Licea wrote the following prescriptions for Mr. Figueroa:     Prescription # 1:  Percocet (Oxycodone) 5/325 mg, # 16 with no refill.  Patient may take 1 tablet by mouth every 4 hours as needed for severe pain.     Prescription # 2:  Phenergan (Promethazine) 25 mg, # 16 with no refill.  " Patient may take 1 tablet by mouth every 4 - 6 hours as needed for nausea and vomiting.     Mr. Figueroa has a post-op return appointment with Dr. Licea on Wednesday, November 15, 2023 at 1:45 p.m.       BRIDGER Moody, CNOR,  [facility]  ----------------------------------------------------------------------------------------------  Below is Mr. Figueroa's pathology report:     [facility]  [address] [place] [id number]  Ruben Figueroa [id number]  M, 77 yrs, 1945  [address] [place] [address]  H: [phone] M: [phone]     Surgical Pathology (Final result) [id number]  Authorizing Provider: Terrence Licea Jr., MD  Ordering Provider: Terrence Licea Jr., MD  Ordering Location: [facility]  Collected: 10/04/2023 09:47 AM  Pathologist: Abraham Benson MD  Received: 10/04/2023 10:59 AM     Specimens:  A.  Bladder, bladder tumor biopsies     Final Diagnosis  A. 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 Dr. Campos agrees with the interpretation.     Gross Description:  A. Bladder, bladder tumor biopsies:  The specimen is received in formalin as "bladder-bladder tumor biopsies" and consists of multiple hemorrhagic-tan and white biopsy fragments ranging from 0.1-0.5 cm greatest dimension, entirely submitted in block A1. Additionally, within the same container, are multiple, firm, tan and white calcified fragments in aggregate measuring 1.3 x 1.2 x 0.5 cm, entirely submitted in block A2 following decalcification.     Grossing was completed by Jose Cota.     Microscopic Description;  A microscopic examination " was performed and the diagnosis reflects the findings.     Electronically signed by Abraham Benson MD on 10/6/2023 at 9:44 AM    Electronically signed by Terrence Licea Jr., MD at 10/10/2023  3:54 PM  ----------------------------------------------------------------------------------------------------------------------------------------------------------------------------------------------------------------------------------------------------------------------------------------------------------  Above is the discharge summary and pathology report on Mr. Figueroa's bladder tumor.  He has improved somewhat with his frequency but still mostly at daytime problem since that time and nocturia down to once or twice nightly.  He is had some changes blood pressure medications.  His blood pressure is doing much better but he has had more fatigue and never really feels well possibly related to medication.. [December 14, 2023]    Review of Systems       Objective     Physical Exam  Constitutional:       Appearance: Normal appearance. He is normal weight.   Neurological:      Mental Status: He is alert.   Psychiatric:         Mood and Affect: Mood normal.         Behavior: Behavior normal.     Urinalysis revealed only occasional pus cells.  Dipstick had a trace of protein with 1+ leukocytes, pH 6.0 and specific gravity 1.010     Assessment and Plan     1. Primary malignant neoplasm of bladder  -     Cystoscopy; Future; Expected date: 02/14/2024  -     Prior authorization Order    2. Calcification of bladder    3. Urinary frequency    4. Essential hypertension    5. Postop check    Patient generally doing okay.  Return for cystoscopy on Wednesday February 14th at 1:30 p.m.          No follow-ups on file.

## 2024-02-14 ENCOUNTER — PROCEDURE VISIT (OUTPATIENT)
Dept: UROLOGY | Facility: CLINIC | Age: 79
End: 2024-02-14
Payer: MEDICARE

## 2024-02-14 VITALS
BODY MASS INDEX: 25.2 KG/M2 | HEIGHT: 70 IN | SYSTOLIC BLOOD PRESSURE: 152 MMHG | DIASTOLIC BLOOD PRESSURE: 77 MMHG | WEIGHT: 176 LBS | HEART RATE: 66 BPM

## 2024-02-14 DIAGNOSIS — C67.9 PRIMARY MALIGNANT NEOPLASM OF BLADDER: Primary | ICD-10-CM

## 2024-02-14 DIAGNOSIS — N32.89 CALCIFICATION OF BLADDER: ICD-10-CM

## 2024-02-14 DIAGNOSIS — R35.0 URINARY FREQUENCY: ICD-10-CM

## 2024-02-14 DIAGNOSIS — R32 URINARY INCONTINENCE, UNSPECIFIED TYPE: ICD-10-CM

## 2024-02-14 PROCEDURE — 52000 CYSTOURETHROSCOPY: CPT | Mod: S$PBB,,, | Performed by: UROLOGY

## 2024-02-14 PROCEDURE — 52000 CYSTOURETHROSCOPY: CPT | Mod: PBBFAC | Performed by: UROLOGY

## 2024-02-14 RX ORDER — LIDOCAINE HYDROCHLORIDE 20 MG/ML
JELLY TOPICAL
Status: COMPLETED | OUTPATIENT
Start: 2024-02-14 | End: 2024-02-14

## 2024-02-14 RX ORDER — AMLODIPINE BESYLATE 5 MG/1
5 TABLET ORAL DAILY
COMMUNITY
Start: 2023-06-09

## 2024-02-14 RX ADMIN — LIDOCAINE HYDROCHLORIDE 10 ML: 20 JELLY TOPICAL at 02:02

## 2024-02-14 NOTE — PROCEDURES
Flexible cystoscopy    Preoperative diagnosis:  Previous urothelial carcinoma of bladder; for bladder tumor recheck    Postoperative diagnosis:  Same with no recurrence of urothelial carcinoma    Description:  The patient was taken to the clinic cystoscopy room.  He was placed in supine position and usual preparation for flexible cystoscopy carried out.  Urethra was anesthetized with lidocaine jelly.  No sedation was given.  The 16.5 Indonesian Olympus flexible digital cystoscope was passed transurethrally into the bladder.  Anterior urethra was clear.  Posterior urethra appeared to have a very small prostate with essentially no tissue.  Bladder was entered and it looked very similar to what it looked like at last cystoscopy in October.  There was extensive calcification on the left lateral wall and a small amount of calcification on the right lateral wall with abnormal looking tissue surrounding all the calcification on the left.  It looks essentially the same as it did before.  Biopsies of the area previously revealed equivocal low-grade malignancy, but it was not definite.  No papillary lesions were noted.  Orifices were difficult to see.  Bladder capacity was very limited and I could not get bladder fully distended.  I do not feel additional biopsies needed yet..  Recommend repeat cystoscopy in about 4 months.

## 2024-02-14 NOTE — PROCEDURES
Description:  The patient was taken to the clinic cystoscopy room and prepared for flexible cystoscopy.  Urethra was anesthetized with lidocaine jelly.  No sedation was given.  The 16.5 Maori Olympus flexible digital cystoscope was passed transurethrally into the bladder.  Anterior urethra was clear.  Posterior urethra was wide open.  Verumontanum was open and he was open all way through into the bladder and bladder neck had previously been resected.  Calcifications noted on the left lateral posterior bladder wall.  Between the calcified lesion of the left posterolateral wall there was a papillary lesion noted on the left lateral sidewall that appeared to be recurrent TCC.  It was a papillary lesion involving an area of about 2 cm.  Orifice appeared to be resected and I feel it was left orifice.  No tumors were noted anywhere else in the bladder.  Bladder capacity was limited.  Scope was removed patient returned to regular exam room in satisfactory condition.  Recommendation is for TURBT.     Physical exam revealed a well thin white male of stated age 77 in no acute distress.  HEENT within normal limits  Neck supple without lymphadenopathy or carotid bruits.  Heart regular rhythm without appreciable murmur.  Lungs clear to auscultation  Abdomen soft without organomegaly or tenderness  Genitalia that of a normal adult male  Extremities within normal limits  Neurological grossly physiologic  [September 5, 2023]     There are no changes to the above note except patient's heart rate is 40 this morning.  Heart sounds okay with slow rate.  Only change to his medications is increase of Valsartan from 160 mg to 320 mg daily.  He is on no beta-blockers but has had a slow heart rate his whole life, normally 48-52.  ---------------------------------------------------------------------------------------------  The above note is from Dr. Licea's last clinic visit with Mr. Figueroa for repeat cystoscopy for bladder tumor recheck  on September 5, 2023.     Mr. Figueroa was admitted to outpatient surgery department of [facility] morning of Wednesday, October 4, 2023. He was taken to operative suite # 4 where he underwent Cystoscopy with TUR-BT under general LMA anesthesia by Dr. Licea (please see his operative procedure note for complete details). He tolerated the procedure well and was awakened and taken to PACU in stable condition.  While in PACU, he received a total of 1 mg of Hydromorphone IV titrated over a 10 minute period for pain and 10 mg of Morphine IV titrated over a 15 minute period for pain.  After PACU he was taken back to ASC.  While in ASC, he required an additional 25 mg of Meperidine IV for pain.  His pain was finally relieved.  After all the pain medicine he had an uneventful recovery requiring no additional post-op pain medications. A 20 French Chris catheter was left indwelling which he will remove in the morning (Mr. Figueroa has removed his chris catheter in the past and said he is comfortable removing his catheter at home). Urine in the leg bag is clear to pink tinged.  NO blood clots seen.     Mr. Figueroa is feeling better.  He is awake, alert and requesting to go home.  Patient was discharged to home in good condition.      was reviewed on Mr. Figueroa and his last prescription for a narcotic was written by nurse practitioner Felisha Foote on April 6, 2023 for Norco (Hydrocodone) 7.5/325 mg # 60 with no refill.     Dr. Licea wrote the following prescriptions for Mr. Figueroa:     Prescription # 1:  Percocet (Oxycodone) 5/325 mg, # 16 with no refill.  Patient may take 1 tablet by mouth every 4 hours as needed for severe pain.     Prescription # 2:  Phenergan (Promethazine) 25 mg, # 16 with no refill.  Patient may take 1 tablet by mouth every 4 - 6 hours as needed for nausea and vomiting.     Mr. Figueroa has a post-op return appointment with Dr. Licea on Wednesday, November 15, 2023 at 1:45 p.m.       Javi MINA  "BRIDGER Rose CNOR,  [facility]  ----------------------------------------------------------------------------------------------  Below is Mr. Figueroa's pathology report:     [facility]  [address] [place] [id number]  Ruben Figueroa [id number]  M, 77 yrs, 1945  [address] [facility] [address]  H: [phone] M: [phone]     Surgical Pathology (Final result) [id number]  Authorizing Provider: Terrence Licea Jr., MD  Ordering Provider: Terrence Licea Jr., MD  Ordering Location: [facility]  Collected: 10/04/2023 09:47 AM  Pathologist: Abraham Benson MD  Received: 10/04/2023 10:59 AM     Specimens:  A.  Bladder, bladder tumor biopsies     Final Diagnosis  A. 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 Dr. Campos agrees with the interpretation.     Gross Description:  A. Bladder, bladder tumor biopsies:  The specimen is received in formalin as "bladder-bladder tumor biopsies" and consists of multiple hemorrhagic-tan and white biopsy fragments ranging from 0.1-0.5 cm greatest dimension, entirely submitted in block A1. Additionally, within the same container, are multiple, firm, tan and white calcified fragments in aggregate measuring 1.3 x 1.2 x 0.5 cm, entirely submitted in block A2 following decalcification.     Grossing was completed by Jose Cota.     Microscopic Description;  A microscopic examination was performed and the diagnosis reflects the findings.     Electronically signed by Abraham Benson MD on 10/6/2023 at 9:44 AM     Electronically signed by Terrence Licea Jr., MD at 10/10/2023  3:54 " PM  ----------------------------------------------------------------------------------------------------------------------------------------------------------------------------------------------------------------------------------------------------------------------------------------------------------  Above is the discharge summary and pathology report on Mr. Figueroa's bladder tumor.  He has improved somewhat with his frequency but still mostly at daytime problem since that time and nocturia down to once or twice nightly.  He is had some changes blood pressure medications.  His blood pressure is doing much better but he has had more fatigue and never really feels well possibly related to medication.. [December 14, 2023]     Mr. Figueroa is here for 1st cystoscopy in 4 months.  He continues mainly to have the problems with daytime urinary frequency.  Less of a problem at night because he wears protection and has leakage at night but nocturia only x1 typically.  No new health problems. [February 14, 2024]    Urinalysis by me looks surprisingly good.  I saw only occasional pus cells.  The dipstick negative with pH of 6.0 and specific gravity 1.010. [February 14, 2024]

## 2024-02-15 NOTE — PATIENT INSTRUCTIONS
Cystoscopy as described.  No definite recurrence of TCC.  Extensive bladder wall calcification is basically unchanged.  Patient given Cipro 500 mg b.i.d. for 3 days  Repeat cystoscopy in 4 months.  We will try Gemtesa for urinary frequency and see if that helps frequency any.

## 2024-04-10 ENCOUNTER — OFFICE VISIT (OUTPATIENT)
Dept: FAMILY MEDICINE | Facility: CLINIC | Age: 79
End: 2024-04-10
Payer: MEDICARE

## 2024-04-10 VITALS
HEART RATE: 50 BPM | HEIGHT: 70 IN | DIASTOLIC BLOOD PRESSURE: 90 MMHG | BODY MASS INDEX: 25.34 KG/M2 | RESPIRATION RATE: 18 BRPM | OXYGEN SATURATION: 99 % | SYSTOLIC BLOOD PRESSURE: 160 MMHG | WEIGHT: 177 LBS

## 2024-04-10 DIAGNOSIS — E78.2 MIXED HYPERLIPIDEMIA: ICD-10-CM

## 2024-04-10 DIAGNOSIS — N40.1 BENIGN PROSTATIC HYPERPLASIA WITH INCOMPLETE BLADDER EMPTYING: Chronic | ICD-10-CM

## 2024-04-10 DIAGNOSIS — R32 URINARY INCONTINENCE, UNSPECIFIED TYPE: ICD-10-CM

## 2024-04-10 DIAGNOSIS — C67.9 MALIGNANT NEOPLASM OF URINARY BLADDER, UNSPECIFIED SITE: ICD-10-CM

## 2024-04-10 DIAGNOSIS — R39.14 BENIGN PROSTATIC HYPERPLASIA WITH INCOMPLETE BLADDER EMPTYING: Chronic | ICD-10-CM

## 2024-04-10 DIAGNOSIS — F32.A DEPRESSION, UNSPECIFIED DEPRESSION TYPE: ICD-10-CM

## 2024-04-10 DIAGNOSIS — I10 PRIMARY HYPERTENSION: Primary | ICD-10-CM

## 2024-04-10 DIAGNOSIS — R30.0 DYSURIA: ICD-10-CM

## 2024-04-10 LAB
ALBUMIN SERPL BCP-MCNC: 3.8 G/DL (ref 3.5–5)
ALBUMIN/GLOB SERPL: 1 {RATIO}
ALP SERPL-CCNC: 94 U/L (ref 45–115)
ALT SERPL W P-5'-P-CCNC: 16 U/L (ref 16–61)
ANION GAP SERPL CALCULATED.3IONS-SCNC: 9 MMOL/L (ref 7–16)
AST SERPL W P-5'-P-CCNC: 16 U/L (ref 15–37)
BASOPHILS # BLD AUTO: 0.05 K/UL (ref 0–0.2)
BASOPHILS NFR BLD AUTO: 0.6 % (ref 0–1)
BILIRUB SERPL-MCNC: 0.7 MG/DL (ref ?–1.2)
BILIRUB UR QL STRIP: NEGATIVE
BUN SERPL-MCNC: 12 MG/DL (ref 7–18)
BUN/CREAT SERPL: 8 (ref 6–20)
CALCIUM SERPL-MCNC: 9.5 MG/DL (ref 8.5–10.1)
CHLORIDE SERPL-SCNC: 111 MMOL/L (ref 98–107)
CHOLEST SERPL-MCNC: 214 MG/DL (ref 0–200)
CHOLEST/HDLC SERPL: 3.8 {RATIO}
CLARITY UR: CLEAR
CO2 SERPL-SCNC: 28 MMOL/L (ref 21–32)
COLOR UR: COLORLESS
CREAT SERPL-MCNC: 1.42 MG/DL (ref 0.7–1.3)
DIFFERENTIAL METHOD BLD: ABNORMAL
EGFR (NO RACE VARIABLE) (RUSH/TITUS): 50 ML/MIN/1.73M2
EOSINOPHIL # BLD AUTO: 0.24 K/UL (ref 0–0.5)
EOSINOPHIL NFR BLD AUTO: 2.9 % (ref 1–4)
ERYTHROCYTE [DISTWIDTH] IN BLOOD BY AUTOMATED COUNT: 13.3 % (ref 11.5–14.5)
GLOBULIN SER-MCNC: 3.7 G/DL (ref 2–4)
GLUCOSE SERPL-MCNC: 110 MG/DL (ref 74–106)
GLUCOSE UR STRIP-MCNC: NORMAL MG/DL
HCT VFR BLD AUTO: 43.4 % (ref 40–54)
HDLC SERPL-MCNC: 56 MG/DL (ref 40–60)
HGB BLD-MCNC: 14.3 G/DL (ref 13.5–18)
IMM GRANULOCYTES # BLD AUTO: 0.03 K/UL (ref 0–0.04)
IMM GRANULOCYTES NFR BLD: 0.4 % (ref 0–0.4)
KETONES UR STRIP-SCNC: NEGATIVE MG/DL
LDLC SERPL CALC-MCNC: 140 MG/DL
LDLC/HDLC SERPL: 2.5 {RATIO}
LEUKOCYTE ESTERASE UR QL STRIP: NEGATIVE
LYMPHOCYTES # BLD AUTO: 2.69 K/UL (ref 1–4.8)
LYMPHOCYTES NFR BLD AUTO: 32.4 % (ref 27–41)
MCH RBC QN AUTO: 31.7 PG (ref 27–31)
MCHC RBC AUTO-ENTMCNC: 32.9 G/DL (ref 32–36)
MCV RBC AUTO: 96.2 FL (ref 80–96)
MONOCYTES # BLD AUTO: 0.56 K/UL (ref 0–0.8)
MONOCYTES NFR BLD AUTO: 6.7 % (ref 2–6)
MPC BLD CALC-MCNC: 11.1 FL (ref 9.4–12.4)
NEUTROPHILS # BLD AUTO: 4.73 K/UL (ref 1.8–7.7)
NEUTROPHILS NFR BLD AUTO: 57 % (ref 53–65)
NITRITE UR QL STRIP: NEGATIVE
NONHDLC SERPL-MCNC: 158 MG/DL
NRBC # BLD AUTO: 0 X10E3/UL
NRBC, AUTO (.00): 0 %
PH UR STRIP: 6.5 PH UNITS
PLATELET # BLD AUTO: 193 K/UL (ref 150–400)
POTASSIUM SERPL-SCNC: 3.1 MMOL/L (ref 3.5–5.1)
PROT SERPL-MCNC: 7.5 G/DL (ref 6.4–8.2)
PROT UR QL STRIP: NEGATIVE
RBC # BLD AUTO: 4.51 M/UL (ref 4.6–6.2)
RBC # UR STRIP: NEGATIVE /UL
SODIUM SERPL-SCNC: 145 MMOL/L (ref 136–145)
SP GR UR STRIP: 1.01
TRIGL SERPL-MCNC: 88 MG/DL (ref 35–150)
UROBILINOGEN UR STRIP-ACNC: NORMAL MG/DL
VLDLC SERPL-MCNC: 18 MG/DL
WBC # BLD AUTO: 8.3 K/UL (ref 4.5–11)

## 2024-04-10 PROCEDURE — 85025 COMPLETE CBC W/AUTO DIFF WBC: CPT | Mod: ,,, | Performed by: CLINICAL MEDICAL LABORATORY

## 2024-04-10 PROCEDURE — 80053 COMPREHEN METABOLIC PANEL: CPT | Mod: ,,, | Performed by: CLINICAL MEDICAL LABORATORY

## 2024-04-10 PROCEDURE — 80061 LIPID PANEL: CPT | Mod: ,,, | Performed by: CLINICAL MEDICAL LABORATORY

## 2024-04-10 PROCEDURE — 81003 URINALYSIS AUTO W/O SCOPE: CPT | Mod: QW,,, | Performed by: CLINICAL MEDICAL LABORATORY

## 2024-04-10 PROCEDURE — 99214 OFFICE O/P EST MOD 30 MIN: CPT | Mod: ,,, | Performed by: FAMILY MEDICINE

## 2024-04-10 RX ORDER — TRAZODONE HYDROCHLORIDE 100 MG/1
100 TABLET ORAL NIGHTLY
Qty: 90 TABLET | Refills: 3 | Status: SHIPPED | OUTPATIENT
Start: 2024-04-10

## 2024-04-10 NOTE — PROGRESS NOTES
Jon Gonzalez MD        PATIENT NAME: Ruben Figueroa  : 1945  DATE: 4/10/24  MRN: [id number]      Billing Provider: Jon Gonzalez MD  Level of Service: MI OFFICE/OUTPT VISIT, EST, LEVL IV, 30-39 MIN  Patient PCP Information       Provider PCP Type    Jon Gonzalez MD General            Reason for Visit / Chief Complaint: Hypertension       Update PCP  Update Chief Complaint         History of Present Illness / Problem Focused Workflow     Ruben Figueroa presents to the clinic with Hypertension     Routine followup.  No significant interval change. Uses depends due to incontinence.  No meds yet today    Hypertension  Pertinent negatives include no chest pain, headaches, neck pain or palpitations.       Review of Systems     Review of Systems   Constitutional:  Negative for activity change, appetite change, fever and unexpected weight change.   HENT:  Negative for congestion, rhinorrhea, sinus pressure, sinus pain, sore throat and trouble swallowing.    Eyes:  Negative for photophobia, pain, discharge and visual disturbance.   Respiratory:  Negative for cough, chest tightness, wheezing and stridor.    Cardiovascular:  Negative for chest pain, palpitations and leg swelling.   Gastrointestinal:  Negative for abdominal pain, blood in stool, constipation, diarrhea and nausea.   Endocrine: Negative for polydipsia, polyphagia and polyuria.   Genitourinary:  Negative for difficulty urinating, flank pain and hematuria.   Musculoskeletal:  Negative for arthralgias and neck pain.   Skin:  Negative for rash.   Allergic/Immunologic: Negative for food allergies.   Neurological:  Negative for dizziness, tremors, seizures, syncope, weakness (global weakness) and headaches.   Psychiatric/Behavioral:  Negative for behavioral problems, confusion, decreased concentration, dysphoric mood and hallucinations. The patient is not nervous/anxious.         Medical / Social / Family History     Past Medical History:   Diagnosis  Palliative Medicine Our team will be available tmrw at 10am, apparently pt's NOK is mother Darryle Yoandy who will be here at that time- see care management note from today. Date    BPH (benign prostatic hyperplasia)     Chronic prostatitis     Hyperlipidemia     Hypertension     Insomnia     Malignant neoplasm of bladder     Sciatica        Past Surgical History:   Procedure Laterality Date    BLADDER TUMOR EXCISION      skin cancer removed      TURBT (TRANSURETHRAL RESECTION OF BLADDER TUMOR) N/A 10/4/2023    Procedure: TURBT (TRANSURETHRAL RESECTION OF BLADDER TUMOR);  Surgeon: Terrence Licea Jr., MD;  Location: [facility];  Service: Urology;  Laterality: N/A;       Social History    reports that he quit smoking about 34 years ago. His smoking use included cigarettes. He started smoking about 64 years ago. He has a 30.0 pack-year smoking history. He has been exposed to tobacco smoke. He has never used smokeless tobacco. He reports current alcohol use. He reports that he does not use drugs.    Family History  's family history includes Bladder Cancer in his father; Heart disease in his father; Hypertension in his mother.    Medications and Allergies     Medications  No outpatient medications have been marked as taking for the 4/10/24 encounter (Office Visit) with Jon Gonzalez MD.       Allergies  Review of patient's allergies indicates:   Allergen Reactions    Doxycycline hyclate     Penicillins        Physical Examination     Vitals:    04/10/24 1031   BP: (!) 160/90   Pulse: (!) 50   Resp: 18     Physical Exam  Constitutional:       General: He is not in acute distress.     Appearance: Normal appearance.   HENT:      Head: Normocephalic.      Right Ear: Tympanic membrane and ear canal normal.      Left Ear: Tympanic membrane and ear canal normal.      Nose: Nose normal.      Mouth/Throat:      Mouth: Mucous membranes are moist.      Pharynx: No oropharyngeal exudate.   Eyes:      Extraocular Movements: Extraocular movements intact.      Pupils: Pupils are equal, round, and reactive to light.   Cardiovascular:      Rate and Rhythm: Normal rate and regular rhythm.       Heart sounds: No murmur heard.  Pulmonary:      Effort: Pulmonary effort is normal.      Breath sounds: Normal breath sounds. No wheezing.   Abdominal:      General: Abdomen is flat. Bowel sounds are normal.      Palpations: Abdomen is soft.      Hernia: No hernia is present.   Musculoskeletal:         General: Normal range of motion.      Cervical back: Normal range of motion and neck supple.      Right lower leg: No edema.      Left lower leg: No edema.   Lymphadenopathy:      Cervical: No cervical adenopathy.   Skin:     General: Skin is warm and dry.      Coloration: Skin is not jaundiced.      Findings: No lesion.   Neurological:      General: No focal deficit present.      Mental Status: He is alert and oriented to person, place, and time.      Cranial Nerves: No cranial nerve deficit.      Gait: Gait normal.   Psychiatric:         Mood and Affect: Mood normal.         Behavior: Behavior normal.         Judgment: Judgment normal.          Assessment and Plan (including Health Maintenance)      Problem List  Smart Sets  Document Outside HM   :    Plan:     1. Depression, unspecified depression type  The current medical regimen is effective;  continue present plan and medications.          Health Maintenance Due   Topic Date Due    TETANUS VACCINE  Never done    Shingles Vaccine (1 of 2) Never done    RSV Vaccine (Age 60+ and Pregnant patients) (1 - 1-dose 60+ series) Never done    COVID-19 Vaccine (3 - Moderna risk series) 05/11/2021    Influenza Vaccine (1) Never done       1. Primary hypertension    2. Depression, unspecified depression type  -     traZODone (DESYREL) 100 MG tablet; Take 1 tablet (100 mg total) by mouth every evening.  Dispense: 90 tablet; Refill: 3    3. Urinary incontinence, unspecified type  -     diaper,brief,adult,disposable Misc; 1 Pair by Misc.(Non-Drug; Combo Route) route 3 (three) times daily as needed.  Dispense: 96 each; Refill: 11    4. Mixed hyperlipidemia    5. Benign prostatic  hyperplasia with incomplete bladder emptying    6. Malignant neoplasm of urinary bladder, unspecified site         Health Maintenance Topics with due status: Not Due       Topic Last Completion Date    Lipid Panel 12/28/2022       Future Appointments   Date Time Provider Department Center   6/17/2024  1:15 PM Terrence Licea Jr., MD [facility]   10/24/2024  1:00 PM Charley Kim MD [facility]        There are no Patient Instructions on file for this visit.  Follow up in about 6 months (around 10/10/2024) for routine followup.     Signature:  Jon Gonzalez MD      Date of encounter: 4/10/24

## 2024-04-11 DIAGNOSIS — I10 PRIMARY HYPERTENSION: Primary | ICD-10-CM

## 2024-04-11 RX ORDER — POTASSIUM CHLORIDE 750 MG/1
10 CAPSULE, EXTENDED RELEASE ORAL DAILY
Qty: 90 CAPSULE | Refills: 3 | Status: SHIPPED | OUTPATIENT
Start: 2024-04-11

## 2024-06-17 ENCOUNTER — PROCEDURE VISIT (OUTPATIENT)
Dept: UROLOGY | Facility: CLINIC | Age: 79
End: 2024-06-17
Payer: MEDICARE

## 2024-06-17 VITALS
SYSTOLIC BLOOD PRESSURE: 156 MMHG | HEIGHT: 70 IN | WEIGHT: 176 LBS | HEART RATE: 57 BPM | DIASTOLIC BLOOD PRESSURE: 79 MMHG | BODY MASS INDEX: 25.2 KG/M2

## 2024-06-17 DIAGNOSIS — C67.9 PRIMARY MALIGNANT NEOPLASM OF BLADDER: ICD-10-CM

## 2024-06-17 DIAGNOSIS — N39.0 URINARY TRACT INFECTION WITHOUT HEMATURIA, SITE UNSPECIFIED: Primary | ICD-10-CM

## 2024-06-17 PROCEDURE — 99024 POSTOP FOLLOW-UP VISIT: CPT | Mod: ,,, | Performed by: UROLOGY

## 2024-06-17 PROCEDURE — 87086 URINE CULTURE/COLONY COUNT: CPT | Mod: ,,, | Performed by: CLINICAL MEDICAL LABORATORY

## 2024-06-17 RX ORDER — VALSARTAN 320 MG/1
320 TABLET ORAL DAILY
COMMUNITY
Start: 2024-03-28

## 2024-06-17 NOTE — PATIENT INSTRUCTIONS
Mr. Figueroa was scheduled for another cysto today as I thought I was going to retire by June 30th at the time of his last visit.  It turns out that I will be here a little longer until Dr. Tabor arrives.  We will postpone his cysto a couple of months.  No charge for visit.  Urine culture will be sent.

## 2024-06-19 LAB — UA COMPLETE W REFLEX CULTURE PNL UR: NO GROWTH

## 2024-07-09 DIAGNOSIS — Z71.89 COMPLEX CARE COORDINATION: ICD-10-CM

## 2024-08-14 ENCOUNTER — PROCEDURE VISIT (OUTPATIENT)
Dept: UROLOGY | Facility: CLINIC | Age: 79
End: 2024-08-14
Payer: MEDICARE

## 2024-08-14 VITALS
HEART RATE: 61 BPM | WEIGHT: 170 LBS | HEIGHT: 70 IN | BODY MASS INDEX: 24.34 KG/M2 | DIASTOLIC BLOOD PRESSURE: 82 MMHG | SYSTOLIC BLOOD PRESSURE: 159 MMHG

## 2024-08-14 DIAGNOSIS — C67.9 PRIMARY MALIGNANT NEOPLASM OF BLADDER: Primary | ICD-10-CM

## 2024-08-14 DIAGNOSIS — I10 ESSENTIAL HYPERTENSION: ICD-10-CM

## 2024-08-14 DIAGNOSIS — N32.89 CALCIFICATION OF BLADDER: ICD-10-CM

## 2024-08-14 DIAGNOSIS — Z01.818 PRE-OP TESTING: ICD-10-CM

## 2024-08-14 DIAGNOSIS — R79.89 AZOTEMIA: ICD-10-CM

## 2024-08-14 DIAGNOSIS — N39.0 URINARY TRACT INFECTION WITHOUT HEMATURIA, SITE UNSPECIFIED: ICD-10-CM

## 2024-08-14 DIAGNOSIS — R32 URINARY INCONTINENCE, UNSPECIFIED TYPE: ICD-10-CM

## 2024-08-14 DIAGNOSIS — C67.9 MALIGNANT NEOPLASM OF URINARY BLADDER, UNSPECIFIED SITE: Primary | ICD-10-CM

## 2024-08-14 PROCEDURE — 52000 CYSTOURETHROSCOPY: CPT | Mod: S$PBB,,, | Performed by: UROLOGY

## 2024-08-14 PROCEDURE — 52000 CYSTOURETHROSCOPY: CPT | Mod: PBBFAC | Performed by: UROLOGY

## 2024-08-14 PROCEDURE — 87086 URINE CULTURE/COLONY COUNT: CPT | Mod: ,,, | Performed by: CLINICAL MEDICAL LABORATORY

## 2024-08-14 RX ORDER — LIDOCAINE HYDROCHLORIDE 20 MG/ML
JELLY TOPICAL
Status: COMPLETED | OUTPATIENT
Start: 2024-08-14 | End: 2024-08-14

## 2024-08-14 RX ADMIN — LIDOCAINE HYDROCHLORIDE 10 ML: 20 JELLY TOPICAL at 03:08

## 2024-08-14 NOTE — PROCEDURES
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  The urethra was anesthetized with lidocaine jelly.  No sedation was given.  The 16.5 Citizen of the [place] Olympus flexible digital cystoscope was passed transurethrally into the bladder and bladder viewed carefully.  The anterior urethra was clear.  Posterior urethra had very little prostate tissue present.  The bladder neck  was wide open post resection.  There was the usual calcification on the left side of the bladder where there was a defect with just calcification noted on the mid left lateral wall.  Ureteral orifices were dilated post resection. Both appeared very patulous.  There were few other little areas of calcification noted.  I did not see any obvious tumors initially but as the scope was turned in retrograde fashion there was some papillary regrowth just inside the bladder neck on the patient's right side anteriorly.  This did appear to be a lesion with multiple small papillary regrowth involving about 2 sq cm of tissue.  It had the classical appearance of a recurrent urothelial carcinoma.  Scope was removed and I tried to see the tumor looking in forward direction but was unable to see it.  Scope was removed and patient returned to regular exam room in satisfactory condition.  He needs the tumor destroyed with cautery and biopsies if possible.  That will have to be done under general anesthesia because of the location.  Patient advised of findings.  -----------------------------------------------------------------------------------------------------------------------    Physical examination revealed a well-developed well-nourished white male of 78 in the acute distress  HEENT:   Within normal limits  Neck supple without lymphadenopathy or carotid bruits.  Heart regular rhythm at a very slow rate without appreciable murmurs.  Lungs clear to auscultation:    Abdomen:  Soft without organomegaly or tenderness  Genitalia:  That of a normal adult male  Extremities:  Within normal limits  Neurological:  Grossly physiologic    Urinalysis by me basically unremarkable.  I saw only occasional pus cells and did not appreciate any red cells microscopically.  Dipstick was negative with pH 6.0 and specific gravity 1.005.                ----------------------------------------------------------------------------------------------------------------------------------------------------------------------------------------------------------------------------------------------------------------------------------------------------------------------------------------------------------

## 2024-08-14 NOTE — PATIENT INSTRUCTIONS
Mr. Figueroa was found to have a small recurrence of urothelial carcinoma just inside the bladder neck as described.  Tumor could not be destroyed today because of location.  Patient will have TURBT on Thursday September 5 as 1st case.  EKG obtained from Dr. Kessler's office.  Lab data will be done today.  Instructions regarding pre admission given by Ms. Jim and understood by patient and wife.    Lab studies revealed creatinine has gone up to 1.55.  After we do the bladder tumor destruction probably will need to make him an appointment with Nephrology to see if anything can be done to help avoid further worsening

## 2024-08-16 LAB — UA COMPLETE W REFLEX CULTURE PNL UR: NO GROWTH

## 2024-09-04 DIAGNOSIS — C67.9 PRIMARY MALIGNANT NEOPLASM OF BLADDER: Primary | ICD-10-CM

## 2024-09-04 RX ORDER — SODIUM CHLORIDE, SODIUM LACTATE, POTASSIUM CHLORIDE, CALCIUM CHLORIDE 600; 310; 30; 20 MG/100ML; MG/100ML; MG/100ML; MG/100ML
INJECTION, SOLUTION INTRAVENOUS CONTINUOUS
Status: CANCELLED | OUTPATIENT
Start: 2024-09-04

## 2024-09-04 RX ORDER — CIPROFLOXACIN 2 MG/ML
400 INJECTION, SOLUTION INTRAVENOUS ONCE
Status: CANCELLED | OUTPATIENT
Start: 2024-09-05

## 2024-09-05 ENCOUNTER — ANESTHESIA (OUTPATIENT)
Dept: SURGERY | Facility: HOSPITAL | Age: 79
End: 2024-09-05
Payer: MEDICARE

## 2024-09-05 ENCOUNTER — ANESTHESIA EVENT (OUTPATIENT)
Dept: SURGERY | Facility: HOSPITAL | Age: 79
End: 2024-09-05
Payer: MEDICARE

## 2024-09-05 ENCOUNTER — HOSPITAL ENCOUNTER (OUTPATIENT)
Facility: HOSPITAL | Age: 79
Discharge: HOME | End: 2024-09-05
Attending: UROLOGY | Admitting: UROLOGY
Payer: MEDICARE

## 2024-09-05 VITALS
SYSTOLIC BLOOD PRESSURE: 174 MMHG | HEIGHT: 70 IN | RESPIRATION RATE: 16 BRPM | BODY MASS INDEX: 25.05 KG/M2 | OXYGEN SATURATION: 99 % | HEART RATE: 54 BPM | DIASTOLIC BLOOD PRESSURE: 102 MMHG | WEIGHT: 175 LBS | TEMPERATURE: 98 F

## 2024-09-05 DIAGNOSIS — C67.9 MALIGNANT NEOPLASM OF URINARY BLADDER, UNSPECIFIED SITE: Primary | ICD-10-CM

## 2024-09-05 DIAGNOSIS — C67.9 PRIMARY MALIGNANT NEOPLASM OF BLADDER: ICD-10-CM

## 2024-09-05 PROCEDURE — 36000706: Performed by: UROLOGY

## 2024-09-05 PROCEDURE — 27000510 HC BLANKET BAIR HUGGER ANY SIZE: Performed by: ANESTHESIOLOGY

## 2024-09-05 PROCEDURE — 63600175 PHARM REV CODE 636 W HCPCS: Performed by: ANESTHESIOLOGY

## 2024-09-05 PROCEDURE — 71000033 HC RECOVERY, INTIAL HOUR: Performed by: UROLOGY

## 2024-09-05 PROCEDURE — 63600175 PHARM REV CODE 636 W HCPCS: Performed by: NURSE ANESTHETIST, CERTIFIED REGISTERED

## 2024-09-05 PROCEDURE — 63600175 PHARM REV CODE 636 W HCPCS: Performed by: UROLOGY

## 2024-09-05 PROCEDURE — 27000716 HC OXISENSOR PROBE, ANY SIZE: Performed by: ANESTHESIOLOGY

## 2024-09-05 PROCEDURE — 27000177 HC AIRWAY, LARYNGEAL MASK: Performed by: ANESTHESIOLOGY

## 2024-09-05 PROCEDURE — 25000003 PHARM REV CODE 250: Performed by: UROLOGY

## 2024-09-05 PROCEDURE — 71000016 HC POSTOP RECOV ADDL HR: Performed by: UROLOGY

## 2024-09-05 PROCEDURE — 37000009 HC ANESTHESIA EA ADD 15 MINS: Performed by: UROLOGY

## 2024-09-05 PROCEDURE — 52235 CYSTOSCOPY AND TREATMENT: CPT | Mod: ,,, | Performed by: UROLOGY

## 2024-09-05 PROCEDURE — 71000015 HC POSTOP RECOV 1ST HR: Performed by: UROLOGY

## 2024-09-05 PROCEDURE — 25000003 PHARM REV CODE 250: Performed by: NURSE ANESTHETIST, CERTIFIED REGISTERED

## 2024-09-05 PROCEDURE — 36000707: Performed by: UROLOGY

## 2024-09-05 PROCEDURE — 37000008 HC ANESTHESIA 1ST 15 MINUTES: Performed by: UROLOGY

## 2024-09-05 RX ORDER — ONDANSETRON 4 MG/1
8 TABLET, ORALLY DISINTEGRATING ORAL EVERY 8 HOURS PRN
Status: DISCONTINUED | OUTPATIENT
Start: 2024-09-05 | End: 2024-09-05 | Stop reason: HOSPADM

## 2024-09-05 RX ORDER — GLYCOPYRROLATE 0.2 MG/ML
INJECTION INTRAMUSCULAR; INTRAVENOUS
Status: DISCONTINUED | OUTPATIENT
Start: 2024-09-05 | End: 2024-09-05

## 2024-09-05 RX ORDER — SODIUM CHLORIDE, SODIUM LACTATE, POTASSIUM CHLORIDE, CALCIUM CHLORIDE 600; 310; 30; 20 MG/100ML; MG/100ML; MG/100ML; MG/100ML
INJECTION, SOLUTION INTRAVENOUS CONTINUOUS
Status: DISCONTINUED | OUTPATIENT
Start: 2024-09-05 | End: 2024-09-05 | Stop reason: HOSPADM

## 2024-09-05 RX ORDER — ONDANSETRON HYDROCHLORIDE 2 MG/ML
INJECTION, SOLUTION INTRAVENOUS
Status: DISCONTINUED | OUTPATIENT
Start: 2024-09-05 | End: 2024-09-05

## 2024-09-05 RX ORDER — HYDROMORPHONE HYDROCHLORIDE 2 MG/ML
0.5 INJECTION, SOLUTION INTRAMUSCULAR; INTRAVENOUS; SUBCUTANEOUS EVERY 5 MIN PRN
Status: DISCONTINUED | OUTPATIENT
Start: 2024-09-05 | End: 2024-09-05 | Stop reason: HOSPADM

## 2024-09-05 RX ORDER — PROPOFOL 10 MG/ML
VIAL (ML) INTRAVENOUS
Status: DISCONTINUED | OUTPATIENT
Start: 2024-09-05 | End: 2024-09-05

## 2024-09-05 RX ORDER — ONDANSETRON HYDROCHLORIDE 2 MG/ML
4 INJECTION, SOLUTION INTRAVENOUS DAILY PRN
Status: DISCONTINUED | OUTPATIENT
Start: 2024-09-05 | End: 2024-09-05 | Stop reason: HOSPADM

## 2024-09-05 RX ORDER — LIDOCAINE HYDROCHLORIDE 20 MG/ML
INJECTION, SOLUTION EPIDURAL; INFILTRATION; INTRACAUDAL; PERINEURAL
Status: DISCONTINUED | OUTPATIENT
Start: 2024-09-05 | End: 2024-09-05

## 2024-09-05 RX ORDER — DIPHENHYDRAMINE HYDROCHLORIDE 50 MG/ML
25 INJECTION INTRAMUSCULAR; INTRAVENOUS EVERY 6 HOURS PRN
Status: DISCONTINUED | OUTPATIENT
Start: 2024-09-05 | End: 2024-09-05 | Stop reason: HOSPADM

## 2024-09-05 RX ORDER — MEPERIDINE HYDROCHLORIDE 25 MG/ML
25 INJECTION INTRAMUSCULAR; INTRAVENOUS; SUBCUTANEOUS EVERY 10 MIN PRN
Status: DISCONTINUED | OUTPATIENT
Start: 2024-09-05 | End: 2024-09-05 | Stop reason: HOSPADM

## 2024-09-05 RX ORDER — FENTANYL CITRATE 50 UG/ML
INJECTION, SOLUTION INTRAMUSCULAR; INTRAVENOUS
Status: DISCONTINUED | OUTPATIENT
Start: 2024-09-05 | End: 2024-09-05

## 2024-09-05 RX ORDER — EPINEPHRINE 0.1 MG/ML
INJECTION INTRAVENOUS
Status: DISCONTINUED | OUTPATIENT
Start: 2024-09-05 | End: 2024-09-05

## 2024-09-05 RX ORDER — GLUCAGON 1 MG
1 KIT INJECTION
Status: DISCONTINUED | OUTPATIENT
Start: 2024-09-05 | End: 2024-09-05 | Stop reason: HOSPADM

## 2024-09-05 RX ORDER — MORPHINE SULFATE 10 MG/ML
4 INJECTION INTRAMUSCULAR; INTRAVENOUS; SUBCUTANEOUS EVERY 5 MIN PRN
Status: DISCONTINUED | OUTPATIENT
Start: 2024-09-05 | End: 2024-09-05 | Stop reason: HOSPADM

## 2024-09-05 RX ORDER — OXYCODONE HYDROCHLORIDE 5 MG/1
5 TABLET ORAL EVERY 4 HOURS PRN
Status: DISCONTINUED | OUTPATIENT
Start: 2024-09-05 | End: 2024-09-05 | Stop reason: HOSPADM

## 2024-09-05 RX ORDER — CIPROFLOXACIN 2 MG/ML
400 INJECTION, SOLUTION INTRAVENOUS ONCE
Status: COMPLETED | OUTPATIENT
Start: 2024-09-05 | End: 2024-09-05

## 2024-09-05 RX ORDER — EPHEDRINE SULFATE 50 MG/ML
INJECTION, SOLUTION INTRAVENOUS
Status: DISCONTINUED | OUTPATIENT
Start: 2024-09-05 | End: 2024-09-05

## 2024-09-05 RX ORDER — ATROPINE SULFATE 0.4 MG/ML
INJECTION, SOLUTION ENDOTRACHEAL; INTRAMEDULLARY; INTRAMUSCULAR; INTRAVENOUS; SUBCUTANEOUS
Status: DISCONTINUED | OUTPATIENT
Start: 2024-09-05 | End: 2024-09-05

## 2024-09-05 RX ADMIN — CIPROFLOXACIN 400 MG: 2 INJECTION, SOLUTION INTRAVENOUS at 07:09

## 2024-09-05 RX ADMIN — LIDOCAINE HYDROCHLORIDE 60 MG: 20 INJECTION, SOLUTION INTRAVENOUS at 07:09

## 2024-09-05 RX ADMIN — GLYCOPYRROLATE 0.3 MG: 0.2 INJECTION INTRAMUSCULAR; INTRAVENOUS at 07:09

## 2024-09-05 RX ADMIN — EPHEDRINE SULFATE 10 MG: 50 INJECTION INTRAVENOUS at 07:09

## 2024-09-05 RX ADMIN — MORPHINE SULFATE 4 MG: 10 INJECTION INTRAVENOUS at 09:09

## 2024-09-05 RX ADMIN — ATROPINE SULFATE 0.2 MG: 0.4 INJECTION, SOLUTION INTRAMUSCULAR; INTRAVENOUS; SUBCUTANEOUS at 07:09

## 2024-09-05 RX ADMIN — FENTANYL CITRATE 25 MCG: 50 INJECTION, SOLUTION INTRAMUSCULAR; INTRAVENOUS at 08:09

## 2024-09-05 RX ADMIN — PROPOFOL 80 MG: 10 INJECTION, EMULSION INTRAVENOUS at 07:09

## 2024-09-05 RX ADMIN — SODIUM CHLORIDE, POTASSIUM CHLORIDE, SODIUM LACTATE AND CALCIUM CHLORIDE: 600; 310; 30; 20 INJECTION, SOLUTION INTRAVENOUS at 06:09

## 2024-09-05 RX ADMIN — EPHEDRINE SULFATE 20 MG: 50 INJECTION INTRAVENOUS at 07:09

## 2024-09-05 RX ADMIN — OXYCODONE HYDROCHLORIDE 5 MG: 5 TABLET ORAL at 09:09

## 2024-09-05 RX ADMIN — SODIUM CHLORIDE, POTASSIUM CHLORIDE, SODIUM LACTATE AND CALCIUM CHLORIDE: 600; 310; 30; 20 INJECTION, SOLUTION INTRAVENOUS at 10:09

## 2024-09-05 RX ADMIN — FENTANYL CITRATE 50 MCG: 50 INJECTION, SOLUTION INTRAMUSCULAR; INTRAVENOUS at 07:09

## 2024-09-05 RX ADMIN — EPINEPHRINE 10 MCG: 0.1 INJECTION, SOLUTION ENDOTRACHEAL; INTRACARDIAC; INTRAVENOUS at 07:09

## 2024-09-05 RX ADMIN — ONDANSETRON 4 MG: 2 INJECTION INTRAMUSCULAR; INTRAVENOUS at 07:09

## 2024-09-05 NOTE — OR NURSING
0836 Rec'd pt to PACU asleep with no distress noted, respirations even and unlabored. VSS. Coronel with leg bag patent, secure, intact with no kinks or obstructions noted, red-pink tinged urine noted. No needs. Will continue to monitor.     0901 Pt c/o pain 6/10. IV morphine given, see MAR for admin.     0916 Out of PACU. VSS. No signs of bleeding/distress noted.     0920 Pt to ASC 4 awake and alert. No signs of distress noted, respirations even and unlabored. Family at bedside. Bedside report given to ALONZO Alcocer RN. Coronel with leg bag patent, secure, intact with no kinks or obstructions noted, red-pink tinged urine noted. States pain improved, denies other needs. /78, P 54, R 12, O2 99% RA.

## 2024-09-05 NOTE — H&P
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  The urethra was anesthetized with lidocaine jelly.  No sedation was given.  The 16.5 [place] Olympus flexible digital cystoscope was passed transurethrally into the bladder and bladder viewed carefully.  The anterior urethra was clear.  Posterior urethra had very little prostate tissue present.  The bladder neck  was wide open post resection.  There was the usual calcification on the left side of the bladder where there was a defect with just calcification noted on the mid left lateral wall.  Ureteral orifices were dilated post resection. Both appeared very patulous.  There were few other little areas of calcification noted.  I did not see any obvious tumors initially but as the scope was turned in retrograde fashion there was some papillary regrowth just inside the bladder neck on the patient's right side anteriorly.  This did appear to be a lesion with multiple small papillary regrowth involving about 2 sq cm of tissue.  It had the classical appearance of a recurrent urothelial carcinoma.  Scope was removed and I tried to see the tumor looking in forward direction but was unable to see it.  Scope was removed and patient returned to regular exam room in satisfactory condition.  He needs the tumor destroyed with cautery and biopsies if possible.  That will have to be done under general anesthesia because of the location.  Patient advised of findings.  -------------------------------------------------------------------------------------    Physical examination: revealed a well-developed well-nourished white male of 78 in the acute distress  HEENT:  Within normal limits  Neck:  supple without lymphadenopathy or carotid bruits.  Heart: regular rhythm at a very slow rate without appreciable murmurs.  Lungs: clear to auscultation:    Abdomen:  Soft without organomegaly or tenderness  Genitalia:  That of a normal adult male  Extremities:  Within normal limits  Neurological:  Grossly physiologic     Urinalysis by me basically unremarkable.  I saw only occasional pus cells and did not appreciate any red cells microscopically.  Dipstick was negative with pH 6.0 and specific gravity 1.005.    Mr. Figueroa was found to have a small recurrence of urothelial carcinoma just inside the bladder neck as described.  Tumor could not be destroyed today because of location.  Patient will have TURBT on Thursday September 5 as 1st case.  EKG obtained from Dr. Kessler's office.  Lab data will be done today.  Instructions regarding pre admission given by Ms. Fernandez and understood by patient and wife.     Lab studies revealed creatinine has gone up to 1.55.  After we do the bladder tumor destruction probably will need to make him an appointment with Nephrology to see if anything can be done to help avoid further worsening

## 2024-09-05 NOTE — TRANSFER OF CARE
"Anesthesia Transfer of Care Note    Patient: Ruben Figueroa    Procedure(s) Performed: Procedure(s) (LRB):  TURBT (TRANSURETHRAL RESECTION OF BLADDER TUMOR) (N/A)    Patient location: PACU    Anesthesia Type: general    Transport from OR: Transported from OR on 100% O2 by closed face mask with adequate spontaneous ventilation    Post pain: adequate analgesia    Post assessment: no apparent anesthetic complications    Post vital signs: stable    Level of consciousness: responds to stimulation    Nausea/Vomiting: no nausea/vomiting    Complications: none    Transfer of care protocol was followed      Last vitals: Visit Vitals  BP (!) 154/89 (BP Location: Right arm, Patient Position: Lying)   Pulse 97   Temp 36.7 °C (98 °F) (Oral)   Resp 15   Ht 5' 10" (1.778 m)   Wt 79.4 kg (175 lb)   SpO2 99%   BMI 25.11 kg/m²     "

## 2024-09-05 NOTE — OP NOTE
[facility] - Periop Services  General Surgery  Operative Note    SUMMARY     Date of Procedure: 9/5/2024     Procedure: Procedure(s) (LRB):  TURBT (TRANSURETHRAL RESECTION OF BLADDER TUMOR) (N/A)       Surgeons and Role:     * Terrence Licea Jr., MD - Primary    Assisting Surgeon: None    Pre-Operative Diagnosis: Malignant neoplasm of urinary bladder, unspecified site [C67.9]    Post-Operative Diagnosis: Post-Op Diagnosis Codes:     * Malignant neoplasm of urinary bladder, unspecified site [C67.9]    Anesthesia: General LMA    Operative Findings (including complications, if any):   Multiple suspicious areas on bladder neck and  throughout on anterior and posterior bladder walls without overt papillary appearance plus multiple bladder wall calcifications.  Total areas of suspicious lesions 2-3 cm.    Description of Technical Procedures:   Patient was taken to the hospital endoscopy suite.  Satisfactory general LMA was introduced and patient placed in the dorsal lithotomy position and prepared for destruction of tumors known to be present from previous clinic cystoscopy.  We knew from clinic cystoscopy that main suspicious area was on the bladder neck with multiple areas on anterior and posterior bladder walls.  The area of the bladder neck was felt to be in an area that would be difficult to resect and plan was to biopsy if possible, but if not, destroy any areas of suspicion.  Urothelial carcinoma had been proven with previous bladder tumor surgery.  The Olympus flexible digital 16.5 Frisian cystoscope was passed transurethrally into the bladder.  Anterior urethra was clear.  Posterior urethra had very little prostate remaining and the 1st lesion noted was on the bladder neck as described from clinic cystoscopy.  This was on the anterior bladder neck more to the left of the midline than the right and this area probably was about 1 cm of raised irregular appearing that did not have standard mucosa  appearance but did not have overt papillary appearance either.  Both ureteral orifices were patulous from previous resection.  There was a large area on the left bladder wall that was calcified and appeared that the entire thickness of the bladder was replaced with calcification above the left ureteral orifice.  There were multiple red petechial areas on the anterior and posterior bladder walls that were suspicious for early urothelial carcinomas.  None had a raised appearance.  It was felt that we would not be able to get adequate biopsies of any the areas so we would destroy the areas because I suspect some of the reddish areas were early urothelial carcinomas and others were just inflammatory tissue.  Spot fulguration of the small lesions on the anterior and posterior bladder walls were carried out and about 10 areas that were cauterized.  The main concern was about the tissue on the bladder neck.  Bladder was emptied of fluid and scope had to be turned backward to use the Bugbee electrode on the bladder neck from about the 12 to 3 o'clock position on looking into the bladder which meant it was on the opposite side when the scope was turned back on itself.  This area was thoroughly cauterized and totally destroyed.  We pulled the scope back to look at it in forward direction and it appeared that there were no lesions that remained that had not been cauterized.  I did not see any other areas of active bleeding in the bladder or on the bladder neck.  Scope was removed.  A 20 [place] silicone Coronel was left indwelling with 10 cc water in the balloon.  Leg bag was attached and patient sent to PACU in satisfactory condition.  Blood loss was felt to be about 5 mL and there were no complications.  Patient tolerated well but did have bradycardia preceding the case and during the case.    Significant Surgical Tasks Conducted by the Assistant(s), if Applicable:     Estimated Blood Loss (EBL): * No values recorded between  9/5/2024  7:54 AM and 9/5/2024  8:34 AM *           Implants: * No implants in log *    Specimens:   Specimen (24h ago, onward)      None                    Condition: Stable    Disposition: PACU - hemodynamically stable.    Attestation: I was present and scrubbed for the entire procedure.

## 2024-09-05 NOTE — ANESTHESIA POSTPROCEDURE EVALUATION
Anesthesia Post Evaluation    Patient: Ruben Figueroa    Procedure(s) Performed: Procedure(s) (LRB):  TURBT (TRANSURETHRAL RESECTION OF BLADDER TUMOR) (N/A)    Final Anesthesia Type: general      Patient location during evaluation: PACU  Post-procedure vital signs: reviewed and stable  Pain management: adequate  Airway patency: patent    PONV status at discharge: No PONV  Anesthetic complications: no      Cardiovascular status: hemodynamically stable  Respiratory status: unassisted  Hydration status: euvolemic  Follow-up not needed.              Vitals Value Taken Time   /115 09/05/24 1231   Temp 36.7 °C (98 °F) 09/05/24 0840   Pulse 52 09/05/24 1231   Resp 16 09/05/24 1200   SpO2 99 % 09/05/24 1122   Vitals shown include unfiled device data.      Event Time   Out of Recovery 09:16:00         Pain/Matias Score: Pain Rating Prior to Med Admin: 6 (9/5/2024  9:38 AM)  Matias Score: 10 (9/5/2024  1:20 PM)

## 2024-09-05 NOTE — DISCHARGE SUMMARY
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  The urethra was anesthetized with lidocaine jelly.  No sedation was given.  The 16.5 Arabic Olympus flexible digital cystoscope was passed transurethrally into the bladder and bladder viewed carefully.  The anterior urethra was clear.  Posterior urethra had very little prostate tissue present.  The bladder neck  was wide open post resection.  There was the usual calcification on the left side of the bladder where there was a defect with just calcification noted on the mid left lateral wall.  Ureteral orifices were dilated post resection. Both appeared very patulous.  There were few other little areas of calcification noted.  I did not see any obvious tumors initially but as the scope was turned in retrograde fashion there was some papillary regrowth just inside the bladder neck on the patient's right side anteriorly.  This did appear to be a lesion with multiple small papillary regrowth involving about 2 sq cm of tissue.  It had the classical appearance of a recurrent urothelial carcinoma.  Scope was removed and I tried to see the tumor looking in forward direction but was unable to see it.  Scope was removed and patient returned to regular exam room in satisfactory condition.  He needs the tumor destroyed with cautery and biopsies if possible.  That will have to be done under general anesthesia because of the location.  Patient advised of findings.  ------------------------------------------------------------------------------------  Physical examination: revealed a well-developed well-nourished white male of 78 in the acute distress  HEENT:  Within normal limits  Neck:  supple without lymphadenopathy or carotid bruits.  Heart: regular rhythm at a very slow rate without appreciable murmurs.  Lungs: clear to auscultation:    Abdomen:  Soft without organomegaly or tenderness  Genitalia:  That of a normal adult male  Extremities:  Within normal limits  Neurological:  Grossly physiologic     Urinalysis by me basically unremarkable.  I saw only occasional pus cells and did not appreciate any red cells microscopically.  Dipstick was negative with pH 6.0 and specific gravity 1.005.     Mr. Figueroa was found to have a small recurrence of urothelial carcinoma just inside the bladder neck as described.  Tumor could not be destroyed today because of location.  Patient will have TURBT on Thursday September 5 as 1st case.  EKG obtained from Dr. Kessler's office.  Lab data will be done today.  Instructions regarding pre admission given by Ms. Fernandez and understood by patient and wife.     Lab studies revealed creatinine has gone up to 1.55.  After we do the bladder tumor destruction probably will need to make him an appointment with Nephrology to see if anything can be done to help avoid further worsening  [August 14, 2024]  -----------------------------------------------------------------------------------  The above note is from Dr. Licea's last clinic visit with Mr. Figueroa on August 14, 2024.    Mr. Figueroa was admitted to outpatient surgery department of [facility] this morning. He was taken to operative suite # 11 where he underwent Flexible Cystoscopy with fulguration of multiple suspicious areas on the bladder neck and throughout the anterior and posterior bladder walls under general LMA anesthesia by Dr. Licea (please see his operative procedure note for complete details). He tolerated the procedure well and was awakened and taken to PACU in stable condition.  After PACU he was returned to [facility].   He had an uneventful recovery requiring only 1 Percolone 5 mg tablet for  post-op pain.     Dr. Licea left a 20 French Chris catheter indwelling which Mr. Figueroa will remove in the morning (Mr. Figueroa has removed his chris catheter in the past after similar procedures and said he is comfortable removing his catheter at home). Urine in the leg bag is clear to pink tinged.  NO blood clots seen.      was reviewed on Mr. Figueroa and his last narcotic prescription was written on July 17, 2024 for Norco (Hydrocodone) 7.5/325, # 60 with no refill by nurse practitioner Felisha Foote.    Dr. Licea wrote the following prescriptions for Mr. Figueroa:        Prescription # 1: Percocet (Oxycodone) 5 mg, # 16 with no refill.  Patient may take 1 tablet by mouth every 4 hours as needed for pain.    Prescription # 2:  Phenergan (Promethazine) 25 mg, # 16 with no refill.  Patient may take 1 tablet by mouth every 4 hours as needed for nausea and vomiting.    No return appointment was given to Mr. Figueroa at time of discharge.    Mr. Figueroa is awake, alert and feeling good.  He is requesting to go home.  He is discharged to home with his wife on same day of admission, September 5, 2024.      Javi Rose, BRIDGER, JAJA,  [facility]

## 2024-09-05 NOTE — ANESTHESIA PREPROCEDURE EVALUATION
09/05/2024  Ruben Figueroa is a 78 y.o., male.      Pre-op Assessment    I have reviewed the Patient Summary Reports.    I have reviewed the NPO Status.   I have reviewed the Medications.     Review of Systems         Anesthesia Plan  Type of Anesthesia, risks & benefits discussed:    Anesthesia Type: Gen Supraglottic Airway  Intra-op Monitoring Plan: Standard ASA Monitors  Post Op Pain Control Plan: IV/PO Opioids PRN  Induction:  IV  Informed Consent: Informed consent signed with the Patient and all parties understand the risks and agree with anesthesia plan.  All questions answered.   ASA Score: 2    Ready For Surgery From Anesthesia Perspective.     .  NPO greater than 8 hours  No anesthetic complications  Allergic to PCN and doxycycline    Hct 41  Cr 1.6  10/4/23 EKG: Marked sinus bradycardia; 40 bpm   ST and T wave abnormality, consider inferior ischemia     HTN  BPH  Advanced age    MP III; adequate ROM at neck

## 2024-09-05 NOTE — ANESTHESIA PROCEDURE NOTES
Intubation    Date/Time: 9/5/2024 7:29 AM    Performed by: Kavin Bain CRNA  Authorized by: Juan Daniels MD    Intubation:     Induction:  Intravenous    Intubated:  Postinduction    Mask Ventilation:  Easy mask    Attempts:  1    Attempted By:  Student    Difficult Airway Encountered?: No      Complications:  None    Airway Device:  Intubating LMA    Airway Device Size:  4.0    Style/Cuff Inflation:  Cuffed

## 2024-09-05 NOTE — DISCHARGE INSTRUCTIONS
Remove catheter in am. Call dr valentino or return to er if needed. Follow up appointment for 1 month.

## 2024-10-02 DIAGNOSIS — I10 PRIMARY HYPERTENSION: Primary | ICD-10-CM

## 2024-10-02 DIAGNOSIS — E78.2 MIXED HYPERLIPIDEMIA: ICD-10-CM

## 2024-10-07 ENCOUNTER — OFFICE VISIT (OUTPATIENT)
Dept: UROLOGY | Facility: CLINIC | Age: 79
End: 2024-10-07
Payer: MEDICARE

## 2024-10-07 VITALS
HEART RATE: 68 BPM | WEIGHT: 170 LBS | TEMPERATURE: 98 F | DIASTOLIC BLOOD PRESSURE: 66 MMHG | SYSTOLIC BLOOD PRESSURE: 120 MMHG | BODY MASS INDEX: 24.34 KG/M2 | HEIGHT: 70 IN

## 2024-10-07 DIAGNOSIS — Z09 POSTOP CHECK: Primary | ICD-10-CM

## 2024-10-07 DIAGNOSIS — I10 ESSENTIAL HYPERTENSION: ICD-10-CM

## 2024-10-07 DIAGNOSIS — R32 URINARY INCONTINENCE, UNSPECIFIED TYPE: ICD-10-CM

## 2024-10-07 DIAGNOSIS — R31.9 URINARY TRACT INFECTION WITH HEMATURIA, SITE UNSPECIFIED: ICD-10-CM

## 2024-10-07 DIAGNOSIS — N39.0 URINARY TRACT INFECTION WITH HEMATURIA, SITE UNSPECIFIED: ICD-10-CM

## 2024-10-07 DIAGNOSIS — C67.9 PRIMARY MALIGNANT NEOPLASM OF BLADDER: ICD-10-CM

## 2024-10-07 DIAGNOSIS — N32.89 CALCIFICATION OF BLADDER: ICD-10-CM

## 2024-10-07 PROCEDURE — 99215 OFFICE O/P EST HI 40 MIN: CPT | Mod: PBBFAC | Performed by: UROLOGY

## 2024-10-07 PROCEDURE — 87077 CULTURE AEROBIC IDENTIFY: CPT | Mod: ,,, | Performed by: CLINICAL MEDICAL LABORATORY

## 2024-10-07 PROCEDURE — 99999 PR PBB SHADOW E&M-EST. PATIENT-LVL V: CPT | Mod: PBBFAC,,, | Performed by: UROLOGY

## 2024-10-07 PROCEDURE — 87186 SC STD MICRODIL/AGAR DIL: CPT | Mod: ,,, | Performed by: CLINICAL MEDICAL LABORATORY

## 2024-10-07 PROCEDURE — 87086 URINE CULTURE/COLONY COUNT: CPT | Mod: ,,, | Performed by: CLINICAL MEDICAL LABORATORY

## 2024-10-07 PROCEDURE — 99024 POSTOP FOLLOW-UP VISIT: CPT | Mod: ,,, | Performed by: UROLOGY

## 2024-10-07 NOTE — PROGRESS NOTES
Mr. Ruben Figueroa has a long history of recurrent urothelial carcinomas dating back to original high-grade tumor of 2015.  He has had multiple BCG instillations.  He has had calcifications develop in his bladder and a lot of his bladder has been basically replaced with calcifications.  He had fulguration of a suspicious bladder lesion that was just on the bladder neck and difficult to get a true biopsy on September 5th.  I just cauterized the area.  I did not see any active definite carcinoma.  Patient will need continued active surveillance but he has had so much BCG that it is unlikely he can tolerate any additional BCG instillations because of his limited bladder capacity.  Despite that, he has nocturia only 1 time nightly normally.  He does have some gradual worsening of his urinary incontinence and has to have protection because of that, generally up to 4 depends per day..  Overall doing well since the last procedure 1 month ago.  No bleeding problems etc..  Has lost 6 lb since last clinic visit.  Has  less appetite than he used to.  No sensation of infection.  No new health problems otherwise and he has not had any significant bleeding.  [October 7, 2024]    Urinalysis had a few pus cells with occasional red cells.  The dipstick had trace of blood, 2+ leukocytes, pH 5.0 and specific gravity 1.010 [October 7, 2024]

## 2024-10-07 NOTE — PATIENT INSTRUCTIONS
Mr. Figueroa had no trouble recovering from his most recent surgery.  He is back to his baseline.  He needs to continue active surveillance on an indefinite basis.  Urine sent for culture.  He will have follow-up cystoscopy by Dr. Tabor in March, 2025.

## 2024-10-08 DIAGNOSIS — C67.9 PRIMARY MALIGNANT NEOPLASM OF BLADDER: Primary | ICD-10-CM

## 2024-10-09 ENCOUNTER — TELEPHONE (OUTPATIENT)
Dept: UROLOGY | Facility: CLINIC | Age: 79
End: 2024-10-09
Payer: MEDICARE

## 2024-10-09 DIAGNOSIS — N39.0 URINARY TRACT INFECTION WITHOUT HEMATURIA, SITE UNSPECIFIED: Primary | ICD-10-CM

## 2024-10-09 DIAGNOSIS — N28.9 DECREASED RENAL FUNCTION: Primary | ICD-10-CM

## 2024-10-09 LAB — UA COMPLETE W REFLEX CULTURE PNL UR: ABNORMAL

## 2024-10-09 RX ORDER — CEFUROXIME AXETIL 250 MG/1
250 TABLET ORAL 2 TIMES DAILY
Qty: 20 TABLET | Refills: 0 | Status: SHIPPED | OUTPATIENT
Start: 2024-10-09

## 2024-10-09 NOTE — TELEPHONE ENCOUNTER
Telephone call to patient for 2 purposes.  Patient notified of positive urine culture as he grew 8000 colonies of Enterococcus on culture from 2 days ago.  Patient has allergy to penicillin.  I feel it would be better to treat him with cephalosporins than quinolones if he can tolerate.  Prescription submitted for cefuroxime to [facility] pharmacy.  Second reason for the call was related to his abnormal renal function studies.  He is mildly azotemic now with the last 2 lab checks.  Renal function was normal a year ago.  We will make him an appointment with Nephrology for advice on how to protect current renal function.  Patient agreeable with the referral.

## 2024-10-14 ENCOUNTER — OFFICE VISIT (OUTPATIENT)
Dept: FAMILY MEDICINE | Facility: CLINIC | Age: 79
End: 2024-10-14
Payer: MEDICARE

## 2024-10-14 VITALS
SYSTOLIC BLOOD PRESSURE: 120 MMHG | DIASTOLIC BLOOD PRESSURE: 68 MMHG | HEART RATE: 54 BPM | RESPIRATION RATE: 6 BRPM | HEIGHT: 70 IN | OXYGEN SATURATION: 97 % | TEMPERATURE: 98 F | WEIGHT: 166.81 LBS | BODY MASS INDEX: 23.88 KG/M2

## 2024-10-14 DIAGNOSIS — E78.2 MIXED HYPERLIPIDEMIA: ICD-10-CM

## 2024-10-14 DIAGNOSIS — R39.14 BENIGN PROSTATIC HYPERPLASIA WITH INCOMPLETE BLADDER EMPTYING: Chronic | ICD-10-CM

## 2024-10-14 DIAGNOSIS — N18.31 CHRONIC KIDNEY DISEASE, STAGE 3A: Primary | ICD-10-CM

## 2024-10-14 DIAGNOSIS — N40.1 BENIGN PROSTATIC HYPERPLASIA WITH INCOMPLETE BLADDER EMPTYING: Chronic | ICD-10-CM

## 2024-10-14 DIAGNOSIS — I10 PRIMARY HYPERTENSION: ICD-10-CM

## 2024-10-14 LAB
ALBUMIN SERPL BCP-MCNC: 3.7 G/DL (ref 3.5–5)
ALBUMIN/GLOB SERPL: 0.9 {RATIO}
ALP SERPL-CCNC: 81 U/L (ref 45–115)
ALT SERPL W P-5'-P-CCNC: 18 U/L (ref 16–61)
ANION GAP SERPL CALCULATED.3IONS-SCNC: 10 MMOL/L (ref 7–16)
AST SERPL W P-5'-P-CCNC: 19 U/L (ref 15–37)
BASOPHILS # BLD AUTO: 0.05 K/UL (ref 0–0.2)
BASOPHILS NFR BLD AUTO: 0.4 % (ref 0–1)
BILIRUB SERPL-MCNC: 0.4 MG/DL (ref ?–1.2)
BUN SERPL-MCNC: 15 MG/DL (ref 7–18)
BUN/CREAT SERPL: 12 (ref 6–20)
CALCIUM SERPL-MCNC: 9.5 MG/DL (ref 8.5–10.1)
CHLORIDE SERPL-SCNC: 113 MMOL/L (ref 98–107)
CHOLEST SERPL-MCNC: 236 MG/DL (ref 0–200)
CHOLEST/HDLC SERPL: 3.9 {RATIO}
CO2 SERPL-SCNC: 22 MMOL/L (ref 21–32)
CREAT SERPL-MCNC: 1.29 MG/DL (ref 0.7–1.3)
DIFFERENTIAL METHOD BLD: ABNORMAL
EGFR (NO RACE VARIABLE) (RUSH/TITUS): 56 ML/MIN/1.73M2
EOSINOPHIL # BLD AUTO: 0.18 K/UL (ref 0–0.5)
EOSINOPHIL NFR BLD AUTO: 1.6 % (ref 1–4)
ERYTHROCYTE [DISTWIDTH] IN BLOOD BY AUTOMATED COUNT: 13.2 % (ref 11.5–14.5)
GLOBULIN SER-MCNC: 4 G/DL (ref 2–4)
GLUCOSE SERPL-MCNC: 112 MG/DL (ref 74–106)
HCT VFR BLD AUTO: 45.9 % (ref 40–54)
HDLC SERPL-MCNC: 60 MG/DL (ref 40–60)
HGB BLD-MCNC: 14.9 G/DL (ref 13.5–18)
IMM GRANULOCYTES # BLD AUTO: 0.03 K/UL (ref 0–0.04)
IMM GRANULOCYTES NFR BLD: 0.3 % (ref 0–0.4)
LDLC SERPL CALC-MCNC: 152 MG/DL
LDLC/HDLC SERPL: 2.5 {RATIO}
LYMPHOCYTES # BLD AUTO: 3.36 K/UL (ref 1–4.8)
LYMPHOCYTES NFR BLD AUTO: 29.6 % (ref 27–41)
MCH RBC QN AUTO: 31.2 PG (ref 27–31)
MCHC RBC AUTO-ENTMCNC: 32.5 G/DL (ref 32–36)
MCV RBC AUTO: 96.2 FL (ref 80–96)
MONOCYTES # BLD AUTO: 0.71 K/UL (ref 0–0.8)
MONOCYTES NFR BLD AUTO: 6.2 % (ref 2–6)
MPC BLD CALC-MCNC: 10.5 FL (ref 9.4–12.4)
NEUTROPHILS # BLD AUTO: 7.04 K/UL (ref 1.8–7.7)
NEUTROPHILS NFR BLD AUTO: 61.9 % (ref 53–65)
NONHDLC SERPL-MCNC: 176 MG/DL
NRBC # BLD AUTO: 0 X10E3/UL
NRBC, AUTO (.00): 0 %
PLATELET # BLD AUTO: 269 K/UL (ref 150–400)
POTASSIUM SERPL-SCNC: 3.6 MMOL/L (ref 3.5–5.1)
PROT SERPL-MCNC: 7.7 G/DL (ref 6.4–8.2)
RBC # BLD AUTO: 4.77 M/UL (ref 4.6–6.2)
SODIUM SERPL-SCNC: 141 MMOL/L (ref 136–145)
TRIGL SERPL-MCNC: 120 MG/DL (ref 35–150)
VLDLC SERPL-MCNC: 24 MG/DL
WBC # BLD AUTO: 11.37 K/UL (ref 4.5–11)

## 2024-10-14 PROCEDURE — 99214 OFFICE O/P EST MOD 30 MIN: CPT | Mod: ,,, | Performed by: FAMILY MEDICINE

## 2024-10-14 PROCEDURE — 80053 COMPREHEN METABOLIC PANEL: CPT | Mod: ,,, | Performed by: CLINICAL MEDICAL LABORATORY

## 2024-10-14 PROCEDURE — 85025 COMPLETE CBC W/AUTO DIFF WBC: CPT | Mod: ,,, | Performed by: CLINICAL MEDICAL LABORATORY

## 2024-10-14 PROCEDURE — 80061 LIPID PANEL: CPT | Mod: ,,, | Performed by: CLINICAL MEDICAL LABORATORY

## 2024-10-14 NOTE — PROGRESS NOTES
Jon Gonzalez MD        PATIENT NAME: Ruben Figueroa  : 1945  DATE: 10/14/24  MRN: [id number]      Billing Provider: Jon Gonzalez MD  Level of Service: LA OFFICE/OUTPT VISIT, EST, LEVL IV, 30-39 MIN  Patient PCP Information       Provider PCP Type    Jon Gonzalez MD General            Reason for Visit / Chief Complaint: Hypertension (6 month check)       Update PCP  Update Chief Complaint         History of Present Illness / Problem Focused Workflow     Ruben Figueroa presents to the clinic with Hypertension (6 month check)     Routine followup.  No significant interval change    Hypertension  Pertinent negatives include no chest pain, headaches, neck pain or palpitations.       Review of Systems     Review of Systems   Constitutional:  Negative for activity change, appetite change, fever and unexpected weight change.   HENT:  Negative for congestion, rhinorrhea, sinus pressure, sinus pain, sore throat and trouble swallowing.    Eyes:  Negative for photophobia, pain, discharge and visual disturbance.   Respiratory:  Negative for cough, chest tightness, wheezing and stridor.    Cardiovascular:  Negative for chest pain, palpitations and leg swelling.   Gastrointestinal:  Negative for abdominal pain, blood in stool, constipation, diarrhea and nausea.   Endocrine: Negative for polydipsia, polyphagia and polyuria.   Genitourinary:  Negative for difficulty urinating, flank pain and hematuria.   Musculoskeletal:  Negative for arthralgias and neck pain.   Skin:  Negative for rash.   Allergic/Immunologic: Negative for food allergies.   Neurological:  Negative for dizziness, tremors, seizures, syncope, weakness (global weakness) and headaches.   Psychiatric/Behavioral:  Negative for behavioral problems, confusion, decreased concentration, dysphoric mood and hallucinations. The patient is not nervous/anxious.         Medical / Social / Family History     Past Medical History:   Diagnosis Date    BPH (benign  prostatic hyperplasia)     Chronic prostatitis     Hyperlipidemia     Hypertension     Insomnia     Malignant neoplasm of bladder     Sciatica        Past Surgical History:   Procedure Laterality Date    BLADDER TUMOR EXCISION      skin cancer removed      TURBT (TRANSURETHRAL RESECTION OF BLADDER TUMOR) N/A 10/4/2023    Procedure: TURBT (TRANSURETHRAL RESECTION OF BLADDER TUMOR);  Surgeon: Terrence Licea Jr., MD;  Location: [facility];  Service: Urology;  Laterality: N/A;    TURBT (TRANSURETHRAL RESECTION OF BLADDER TUMOR) N/A 9/5/2024    Procedure: TURBT (TRANSURETHRAL RESECTION OF BLADDER TUMOR);  Surgeon: Terrence Licea Jr., MD;  Location: [facility] OR;  Service: Urology;  Laterality: N/A;       Social History    reports that he quit smoking about 34 years ago. His smoking use included cigarettes. He started smoking about 64 years ago. He has a 30 pack-year smoking history. He has been exposed to tobacco smoke. He has never used smokeless tobacco. He reports current alcohol use. He reports that he does not use drugs.    Family History  's family history includes Bladder Cancer in his father; Heart disease in his father; Hypertension in his mother.    Medications and Allergies     Medications  No outpatient medications have been marked as taking for the 10/14/24 encounter (Office Visit) with Jon Gonzalez MD.       Allergies  Review of patient's allergies indicates:   Allergen Reactions    Doxycycline hyclate     Penicillins        Physical Examination     Vitals:    10/14/24 0926   BP: 120/68   Pulse: (!) 54   Resp: (!) 6   Temp: 97.8 °F (36.6 °C)     Physical Exam  Constitutional:       General: He is not in acute distress.     Appearance: Normal appearance.   HENT:      Head: Normocephalic.      Right Ear: Tympanic membrane and ear canal normal.      Left Ear: Tympanic membrane and ear canal normal.      Nose: Nose normal.      Mouth/Throat:      Mouth: Mucous membranes are moist.      Pharynx:  No oropharyngeal exudate.   Eyes:      Extraocular Movements: Extraocular movements intact.      Pupils: Pupils are equal, round, and reactive to light.   Cardiovascular:      Rate and Rhythm: Normal rate and regular rhythm.      Heart sounds: No murmur heard.  Pulmonary:      Effort: Pulmonary effort is normal.      Breath sounds: Normal breath sounds. No wheezing.   Abdominal:      General: Abdomen is flat. Bowel sounds are normal.      Palpations: Abdomen is soft.      Hernia: No hernia is present.   Musculoskeletal:         General: Normal range of motion.      Cervical back: Normal range of motion and neck supple.      Right lower leg: No edema.      Left lower leg: No edema.   Lymphadenopathy:      Cervical: No cervical adenopathy.   Skin:     General: Skin is warm and dry.      Coloration: Skin is not jaundiced.      Findings: No lesion.   Neurological:      General: No focal deficit present.      Mental Status: He is alert and oriented to person, place, and time.      Cranial Nerves: No cranial nerve deficit.      Gait: Gait normal.   Psychiatric:         Mood and Affect: Mood normal.         Behavior: Behavior normal.         Judgment: Judgment normal.          Assessment and Plan (including Health Maintenance)      Problem List  Smart Sets  Document Outside HM   :    Plan:     1. Chronic kidney disease, stage 3a            Health Maintenance Due   Topic Date Due    TETANUS VACCINE  Never done    Shingles Vaccine (1 of 2) Never done    RSV Vaccine (Age 60+ and Pregnant patients) (1 - 1-dose 75+ series) Never done       1. Chronic kidney disease, stage 3a    2. Mixed hyperlipidemia    3. Primary hypertension    4. Benign prostatic hyperplasia with incomplete bladder emptying         Health Maintenance Topics with due status: Not Due       Topic Last Completion Date    Lipid Panel 04/10/2024       Future Appointments   Date Time Provider Department Center   10/23/2024 10:00 AM Giuliana Mc FNP [place] [facility] [place]   10/24/2024  1:00 PM Charley Kim MD [facility]        There are no Patient Instructions on file for this visit.  Follow up in about 6 months (around 4/14/2025) for routine followup.     Signature:  Jon Gonzalez MD      Date of encounter: 10/14/24

## 2024-10-23 ENCOUNTER — OFFICE VISIT (OUTPATIENT)
Dept: NEPHROLOGY | Facility: CLINIC | Age: 79
End: 2024-10-23
Payer: MEDICARE

## 2024-10-23 VITALS
RESPIRATION RATE: 18 BRPM | WEIGHT: 168 LBS | DIASTOLIC BLOOD PRESSURE: 60 MMHG | HEART RATE: 58 BPM | SYSTOLIC BLOOD PRESSURE: 118 MMHG | HEIGHT: 70 IN | BODY MASS INDEX: 24.05 KG/M2 | OXYGEN SATURATION: 98 %

## 2024-10-23 DIAGNOSIS — N28.9 DECREASED RENAL FUNCTION: ICD-10-CM

## 2024-10-23 PROCEDURE — 99203 OFFICE O/P NEW LOW 30 MIN: CPT | Mod: S$PBB,,, | Performed by: NURSE PRACTITIONER

## 2024-10-23 PROCEDURE — 99999 PR PBB SHADOW E&M-EST. PATIENT-LVL V: CPT | Mod: PBBFAC,,, | Performed by: NURSE PRACTITIONER

## 2024-10-23 PROCEDURE — 99215 OFFICE O/P EST HI 40 MIN: CPT | Mod: PBBFAC | Performed by: NURSE PRACTITIONER

## 2024-10-23 NOTE — PROGRESS NOTES
[facility] History and Physical  Patient Name: Ruben Figueroa  MRN: [id number]  Age: 79 y.o.  : 1945    Date: 10/23/2024 10:09 AM    Chief Complaint: Establish Care    HPI :   Mr Figueroa presents to Nephrology clinic to establish care. He is followed by Dr. Jon Gonzalez as his primary care provider.     BPH/Bladder Tumor removed/TURP: followed by Dr. Licea, taking Flomax.    HTN: diagnosed *. Current regimen includes amlodipine 5 mg daily, clonidine 0.1 mg q.4 hours p.r.n., hydralazine 50 mg q.12 hours, hydrochlorothiazide 12.5 mg daily, valsartan 320 mg daily, nifedipine 60 mg daily    Nephrology history: No FH of kidney disease, no nephrolithiasis, or recurrent UTIs. No OTC medications including NSAIDs.     Patient denies any CP, SOB, peripheral edema, dysuria, hematuria, changes in urinary habits, or increased frequency of urination.     Past Medical History:  has a past medical history of BPH (benign prostatic hyperplasia), Chronic prostatitis, Hyperlipidemia, Hypertension, Insomnia, Malignant neoplasm of bladder, and Sciatica.     Past Surgical History:   has a past surgical history that includes Bladder tumor excision; skin cancer removed; turbt (transurethral resection of bladder tumor) (N/A, 10/4/2023); and turbt (transurethral resection of bladder tumor) (N/A, 2024).     Family History:  family history includes Bladder Cancer in his father; Heart disease in his father; Hypertension in his mother. No family history of kidney disease.     Social History:   reports that he quit smoking about 34 years ago. His smoking use included cigarettes. He started smoking about 64 years ago. He has a 30 pack-year smoking history. He has been exposed to tobacco smoke. He has never used smokeless tobacco. He reports current alcohol use. He reports that he does not use drugs.     Allergies: is allergic to doxycycline hyclate and penicillins.     Medications:  Reviewed including OTC medications, herbal supplements, and NSAIDS.     Old records have been reviewed.      Review of Systems:  ROS: A 10 point ROS was completed and found to be negative except for that mentioned above.          Physical Exam:  Vitals:    10/23/24 0958   BP: 118/60   Pulse: (!) 58   Resp: 18       Constitutional: sitting in chair, in NAD  Eyes: EOMI, white sclera  ENMT: moist mucus membranes, nares patent  Cardiovascular: normal rate, S1/S2 noted, no edema  Respiratory: symmetrical chest expansion, CTA-B  Gastrointestinal: +BS, soft, NT/ND  Musculoskeletal: normal, no joint erythema/effusions  Skin: no rash, no purpura, warm extremities  Neurological: Alert and Oriented x 3, afocal    Labs:   Lab Results   Component Value Date     10/14/2024    K 3.6 10/14/2024    CREATININE 1.29 10/14/2024    ALT 18 10/14/2024    AST 19 10/14/2024    LDLCALC 152 10/14/2024    CHOL 236 (H) 10/14/2024    HDL 60 10/14/2024    TRIG 120 10/14/2024    WBC 11.37 (H) 10/14/2024    HGB 14.9 10/14/2024    HCT 45.9 10/14/2024     10/14/2024    PSA 0.275 12/12/2018        Otherwise Reviewed    Assessment/Plan:       Renal function decreased mildly starting 6 months ago, last sCr from 9 days ago reviewed and was 1.2, eGFR rising and was 56 as well. Seems like renal function is slowly resolving back to patients baseline which is sCr 1.0-1.1. Counseled to avoid nephrotoxic agents such as NSAIDs. With recent procedures and patient likely getting dehydrated 2/2 urinary frequency and trying to prevent accidents. Patient advised to increase hydration with water. BP is 118/60 and looks to be well controlled, denies any swelling in lower extremities. Advised to stop HCTZ and monitor swelling.     Proteinuria: urine Prot:Creat ratio is pending. Patient is on RAAS blockade with ARB.     Anemia: CBC pending    BMD: Calcium and Phosphorus PTH, Vit D pending    HTN: well controlled with current meds    RTC pending repeat labs  in 4 weeks.       Signature:             ANAHY Mcmullen  [facility]  [address] [facility] [address]  [phone]         30 minutes of total time spent on the encounter, which includes face to face time and non-face to face time preparing to see the patient (eg, review of tests), Obtaining and/or reviewing separately obtained history, Documenting clinical information in the electronic or other health record, Independently interpreting results (not separately reported) and communicating results to the patient/family/caregiver, or Care coordination (not separately reported).       Date of encounter: 10/23/24

## 2024-10-24 ENCOUNTER — OFFICE VISIT (OUTPATIENT)
Dept: DERMATOLOGY | Facility: CLINIC | Age: 79
End: 2024-10-24
Payer: MEDICARE

## 2024-10-24 DIAGNOSIS — L57.0 ACTINIC KERATOSES: Primary | ICD-10-CM

## 2024-10-24 DIAGNOSIS — L82.1 SEBORRHEIC KERATOSES: ICD-10-CM

## 2024-10-24 DIAGNOSIS — Z85.828 HISTORY OF NONMELANOMA SKIN CANCER: ICD-10-CM

## 2024-10-24 NOTE — PROGRESS NOTES
[facility]  Scotty Kim MD    [address] [place] [address]  [phone]    Fax: [phone]    Patient Name: Ruben Figueroa  Medical Record Number: [id number]  PCP: Jon Gonzalez MD  Age: 79 y.o. : 1945  Contact: [phone] (home)     History of Present Illness:     Ruben Figueroa is a 79 y.o.  male here for follow up of history of NMSC. Most recent BCC of R cheek and L leg treated with Mohs surgery . Denies any new growing lesions.     The patient has no other concerns today.    Review of Systems:     Unremarkable other than mentioned above.     Physical Exam:     General: Relaxed, oriented, alert    Skin examination of the scalp, face, neck, chest, back, abdomen, upper extremities and lower extremities were normal except for as listed below      Assessment and Plan:     1. History of NMSC   Well-healed scar on R cheek and L leg  No e/o recurrence   Recommend sunscreen and good photoprotection       2. Actinic Keratoses  Erythematous, scaly papules on nose, right arm, left hand,     Plan: Liquid Nitrogen.  A total of 3 lesions were treated with liquid nitrogen for 2 freeze-thaw cycles lasting 5 seconds, located on the above locations.   The patient's consent was obtained including but not limited to risks of crusting, scabbing,  blistering, scarring, darker or lighter pigmentary change, recurrence, incomplete removal and infection.    Counseling.  Sun protective clothing and broad spectrum sunscreen can prevent the formation of AK.   AKs can be treated with cryotherapy, photodynamic therapy, imiquimod, topical 5-FU.  Actinic Keratoses are precancerous proliferations that occur within sun damaged skin. If untreated,  a small subset of AKs can develop into Squamous Cell Carcinoma.    3. Seborrheic keratoses   - brown stuck on appearing papules/plaques  - patient educated on benign nature. No treatment necessary unless they become irritated or inflamed        Return to clinic in 1 year    AVS printed with patient instructions     Scotty Kim MD   Mohs Surgery/Dermatologic Oncology  Dermatology

## 2024-10-24 NOTE — PATIENT INSTRUCTIONS
WOUND CARE INSTRUCTIONS    1. Leave your pressure bandage on for 24 hours (unless told to keep it on for 48 hours). You will not need to perform any wound care until this bandage is removed. Please do not get the bandage wet.  2. When you initially begin wound care, you may let the water hit the pressure bandage to loosen it from your skin. The bandage should be removed before bathing/showering.  3. Wash your hands thoroughly before starting wound care. Do not use the same cloth/rag/sponge you would use to wash the remainder of your body as this may introduce bacteria from other areas of your body and possibly cause infection at the surgical site.  4. Please clean the surgery site once to twice daily with a mild liquid soap (i.e. Dove, Cetaphil, Baby shampoo).   5. Dry the area with a fresh Q-tip or clean gauze.  6. Perform Vinegar soak to the area to help prevent infection. Soak the affected area for 5-10 minutes once daily, then pat dry. To make a quart of the vinegar soak, mix 3 tablespoons white vinegar with 1 quart of luke-warm water.   7. Apply a generous amount of Vaseline or Aquaphor to the wound/sutures (If you have been prescribed antibiotic ointment such as Mupirocin or Gentamicin, use this instead of vaseline/aquaphor). If you are not sure of the sanitary condition of any Vaseline/Aquaphor you may have at home, please purchase a new jar or tube.    8. Cut a non-stick bandage pad to fit the area and then use bandaging tape to hold in place. Paper tape is a good option for very sensitive skin types.  9. You will be doing this wound care regimen until sutures are removed   10.  After surgery, you may restart all your medications that were stopped (if applicable).      If your surgical site is on your forehead, or close to the eye area, you will want to use ice packs. Please apply ice packs every hour for 20 minutes while awake. Sleep elevated for the next two nights as this will help decrease the amount of  bruising and swelling you will notice the evening after surgery and into the next morning.   For surgical areas on your arms/legs, try to keep the area elevated above the level of your heart as much as possible. This will help to decrease swelling. Frequent gentle rubbing of your fingers or toes in that area will prevent numbness and stiffness.   If located on your arm/hand, we ask that you do not lift anything heavier than a gallon of milk for two weeks. Keep the arm/hand elevated to help decrease swelling in the wrist and fingers. Do not wear jewelry as impending swelling could cause discomfort.  For surgical areas on your head/neck, do not bend over or stoop down. Do not drop your head, as this increases blood to the surgical area and can induce bleeding. Refrain from use of hair care products, hair coloring, or permanents until sutures have been removed and/or the surgical site has completely healed.    BATHING: Begin bathing/showering once pressure bandage comes off. Do not let direct water pressure hit the surgery site. It is okay if it gets wet, just let the water roll over.    PAIN: Tylenol (Acetaminophen) or NSAIDs such as ibuprofen (Advil) or naproxen (Aleve) are adequate for pain relief in most cases, if you are able to take those medications. Alternating Tylenol (acetaminophen) and NSAIDs at 3 hour intervals works well as these medications work differently. For instance, take 1 g of Tylenol at hour 0, then 200-400 mg of Advil at hour 3 if needed, then 1 g of Tylenol at hour 6 if needed, then 200-400 mg of Advil at hour 9 if needed. Do not exceed the daily limit for either medication, which can be found on the bottle. We try to avoid narcotic medications as much as possible. If you are still having severe pain not managed by the above methods, please call our clinic.    SIGNS OF POSSIBLE INFECTION: Significant redness surrounding the surgery site that is warm to the touch, persistent or worsening pain,  fever or flu-like symptoms, increased swelling to the area, thick yellow discharge, and/or foul odor. Please call our office as soon as possible if you experience any of these symptoms as you may have an infection.     BLEEDING: A mild amount of blood on the bandage is expected. Soaking through the bandage is not normal. If this occurs, remove the soiled bandage and apply uninterrupted pressure for 20 minutes by the clock. If this does not stop the bleeding, hold pressure for another 20 minutes with an ice pack. If bleeding stops, apply a bandage per wound care instructions.     IF THE BLEEDING PERSISTS, PLEASE CALL OUR OFFICE.     Normal office hours:   After hours:     If you have concerns about how your wound is healing and would like to send us a photo, please send us a [employer] message, or call for instructions on how to securely send an email.

## 2025-03-06 ENCOUNTER — PROCEDURE VISIT (OUTPATIENT)
Dept: UROLOGY | Facility: CLINIC | Age: 80
End: 2025-03-06
Payer: MEDICARE

## 2025-03-06 VITALS
HEIGHT: 70 IN | HEART RATE: 52 BPM | SYSTOLIC BLOOD PRESSURE: 183 MMHG | BODY MASS INDEX: 24.48 KG/M2 | DIASTOLIC BLOOD PRESSURE: 73 MMHG | WEIGHT: 171 LBS

## 2025-03-06 DIAGNOSIS — C67.9 PRIMARY MALIGNANT NEOPLASM OF BLADDER: ICD-10-CM

## 2025-03-06 PROCEDURE — 52000 CYSTOURETHROSCOPY: CPT | Mod: S$PBB,,, | Performed by: UROLOGY

## 2025-03-06 PROCEDURE — 99499 UNLISTED E&M SERVICE: CPT | Mod: S$PBB,,, | Performed by: UROLOGY

## 2025-03-06 PROCEDURE — 52000 CYSTOURETHROSCOPY: CPT | Mod: PBBFAC | Performed by: UROLOGY

## 2025-03-06 RX ORDER — LIDOCAINE HYDROCHLORIDE 20 MG/ML
JELLY TOPICAL
Status: SHIPPED | OUTPATIENT
Start: 2025-03-06

## 2025-03-06 NOTE — PROCEDURES
Office Cystoscopy Procedure Note    Date of Procedure: 2025    Indication:  Urothelial carcinoma of the bladder      Informed consent:  The risks, benefits, complications, treatment options, and expected outcomes were discussed with the patient. The patient concurred with the proposed plan and provided informed consent.     Anesthesia: Lidocaine jelly 2%          Procedure:  The patient was placed in the lithotomy position, was prepped and draped in the usual manner using sterile technique, and 2% lidocaine jelly instilled into the urethra.  A 17 F flexible cystoscope was then inserted into the urethra and the urethra and bladder carefully examined.  The following findings were noted:       Findings:   Urethra:   Pendulous urethral normal  Bulbourethral mild narrowing but passable with scope  TUR defect in the prostatic urethra with a narrowing of the bladder neck but passable the scope  Small bladder capacity  Cellules and grade 3 trabeculations throughout  Abnormal calcification seen in the region of the left ureteral orifice concerning for stone  Abnormal calcification seen along the bladder floor and the right bladder wall  No lesions pathognomonic for bladder cancer      Ureteral orifices:       -Right: Not identified       -Left: Not identified     Other findings:     Small capacity bladder        Specimens: None                   Complications: None; patient tolerated the procedure well            Disposition:   I have ordered a stat CT scan of the abdomen and pelvis noncontrast as there is a calcification that may be in the distal ureter essentially  with obstruction.  The patient's bladder is abnormal with small capacity and abnormal scarring throughout with heavy trabeculations       Condition: Stable     Plan:   The patient has not obtained any labs since October and plan to send down for his labs that have been scheduled for the future to check renal function and CBC and will obtain stat CT  scan of the abdomen and pelvis to make sure there is no hydroureteronephrosis as I am concerned that the calcification on the left wall in the region of the left ureteral orifice would represent a  ureteral stone with obstruction.         Ron Tabor MD

## 2025-03-07 ENCOUNTER — HOSPITAL ENCOUNTER (OUTPATIENT)
Dept: RADIOLOGY | Facility: HOSPITAL | Age: 80
Discharge: HOME | End: 2025-03-07
Attending: UROLOGY
Payer: MEDICARE

## 2025-03-07 ENCOUNTER — RESULTS FOLLOW-UP (OUTPATIENT)
Dept: NEPHROLOGY | Facility: CLINIC | Age: 80
End: 2025-03-07
Payer: MEDICARE

## 2025-03-07 DIAGNOSIS — R80.9 PROTEINURIA, UNSPECIFIED TYPE: ICD-10-CM

## 2025-03-07 DIAGNOSIS — N18.31 CHRONIC KIDNEY DISEASE, STAGE 3A: Primary | ICD-10-CM

## 2025-03-07 DIAGNOSIS — C67.9 PRIMARY MALIGNANT NEOPLASM OF BLADDER: ICD-10-CM

## 2025-03-07 PROCEDURE — 74176 CT ABD & PELVIS W/O CONTRAST: CPT | Mod: TC

## 2025-03-07 NOTE — TELEPHONE ENCOUNTER
Spoke w/ , he is aware of his lab work. Agreed to start jardiance. 6m appt made. Aware to avoid high sodium foods and monitor BP

## 2025-03-07 NOTE — TELEPHONE ENCOUNTER
----- Message from ANAHY Kiran sent at 3/7/2025  9:48 AM CST -----  Please let Mr. Figueroa know that CKD III seems to be more of his new baseline now but could also be from this possible obstruction that Dr. Tabor is evaluating. He does have some proteinuria, could   be from his HTN not being adequately controlled at times. Avoid high sodium foods and take his BP meds as prescribed. I'd recommend starting Jardiance for renal protection if he is willing. He will   need a 6 month follow up if he wants to do Jardiance and if not a year follow up. Thank you  ----- Message -----  From: Lab, Background User  Sent: 3/6/2025   1:01 PM CST  To: ANAHY Bautista

## 2025-03-24 ENCOUNTER — OFFICE VISIT (OUTPATIENT)
Dept: UROLOGY | Facility: CLINIC | Age: 80
End: 2025-03-24
Payer: MEDICARE

## 2025-03-24 VITALS
BODY MASS INDEX: 23.91 KG/M2 | HEART RATE: 63 BPM | WEIGHT: 167 LBS | HEIGHT: 70 IN | SYSTOLIC BLOOD PRESSURE: 212 MMHG | DIASTOLIC BLOOD PRESSURE: 87 MMHG

## 2025-03-24 DIAGNOSIS — R39.14 BENIGN PROSTATIC HYPERPLASIA WITH INCOMPLETE BLADDER EMPTYING: Chronic | ICD-10-CM

## 2025-03-24 DIAGNOSIS — C67.9 PRIMARY MALIGNANT NEOPLASM OF BLADDER: Primary | ICD-10-CM

## 2025-03-24 DIAGNOSIS — N13.30 BILATERAL HYDRONEPHROSIS: ICD-10-CM

## 2025-03-24 DIAGNOSIS — N28.9 DECREASED RENAL FUNCTION: ICD-10-CM

## 2025-03-24 DIAGNOSIS — N13.39 OTHER HYDRONEPHROSIS: ICD-10-CM

## 2025-03-24 DIAGNOSIS — N40.1 BENIGN PROSTATIC HYPERPLASIA WITH INCOMPLETE BLADDER EMPTYING: Chronic | ICD-10-CM

## 2025-03-24 PROCEDURE — 99999 PR PBB SHADOW E&M-EST. PATIENT-LVL IV: CPT | Mod: PBBFAC,,, | Performed by: UROLOGY

## 2025-03-24 PROCEDURE — 99214 OFFICE O/P EST MOD 30 MIN: CPT | Mod: S$PBB,,, | Performed by: UROLOGY

## 2025-03-24 PROCEDURE — 99214 OFFICE O/P EST MOD 30 MIN: CPT | Mod: PBBFAC | Performed by: UROLOGY

## 2025-03-24 NOTE — PROGRESS NOTES
Assessment:   1. Primary malignant neoplasm of bladder    2. Decreased renal function  -     Basic Metabolic Panel; Future; Expected date: 03/24/2025  -     NM Kidney W Flow Funct W Wo Pharmacol; Future; Expected date: 03/24/2025    3. Bilateral hydronephrosis  -     NM Kidney W Flow Funct W Wo Pharmacol; Future; Expected date: 03/24/2025    4. Other hydronephrosis  -     Basic Metabolic Panel; Future; Expected date: 03/24/2025  -     CBC Auto Differential; Future; Expected date: 03/24/2025  -     NM Kidney W Flow Funct W Wo Pharmacol; Future; Expected date: 03/24/2025    5. Benign prostatic hyperplasia with incomplete bladder emptying  -     PSA, Total (Diagnostic); Future; Expected date: 03/24/2025         Plan:     We reviewed the CT scan as well as the abnormal calcification and the bilateral hydronephrosis and I have ordered a Mag 3 Lasix renal scan to determine split function and T1 half.     BMP today.               Ron Tabor MD  Urology  03/24/2025          UROLOGY HISTORY AND PHYSICAL EXAM    Subjective:      Patient ID: Ruben Figueroa is a 79 y.o. male.    Chief Complaint::   HPI    The patient is an 79-year-old male with a history of bladder cancer that presents today after undergoing a CT scan which demonstrated bilateral hydronephrosis.  He is not having any gross hematuria and he reports no noticeable decrease in his urinary output.     Past Medical History:   Diagnosis Date    BPH (benign prostatic hyperplasia)     Chronic prostatitis     Hyperlipidemia     Hypertension     Insomnia     Malignant neoplasm of bladder     Sciatica      Past Surgical History:   Procedure Laterality Date    BLADDER TUMOR EXCISION      skin cancer removed      TURBT (TRANSURETHRAL RESECTION OF BLADDER TUMOR) N/A 10/4/2023    Procedure: TURBT (TRANSURETHRAL RESECTION OF BLADDER TUMOR);  Surgeon: Terrence Licea Jr., MD;  Location: [facility], [place];  Service: Urology;  Laterality: N/A;    TURBT (TRANSURETHRAL RESECTION OF  BLADDER TUMOR) N/A 9/5/2024    Procedure: TURBT (TRANSURETHRAL RESECTION OF BLADDER TUMOR);  Surgeon: Terrence Licea Jr., MD;  Location: [facility];  Service: Urology;  Laterality: N/A;        Medications Ordered Prior to Encounter[1]     Review of patient's allergies indicates:   Allergen Reactions    Doxycycline hyclate     Penicillins      Vitals:    03/24/25 1311   BP: (!) 212/87   Pulse: 63          Review of Systems   Constitutional:  Negative for activity change.   Respiratory:  Negative for shortness of breath.    Cardiovascular:  Negative for palpitations.   Gastrointestinal:  Negative for abdominal pain and vomiting.   Genitourinary:  Negative for decreased urine volume and hematuria.   Neurological:  Negative for headaches.        Objective:     Physical Exam  Vitals and nursing note reviewed.   Constitutional:       Appearance: Normal appearance. He is normal weight.   HENT:      Head: Normocephalic and atraumatic.   Eyes:      General: No scleral icterus.     Conjunctiva/sclera: Conjunctivae normal.   Cardiovascular:      Rate and Rhythm: Normal rate.   Pulmonary:      Effort: Pulmonary effort is normal.   Abdominal:      General: There is no distension.   Musculoskeletal:         General: No deformity.   Skin:     General: Skin is warm and dry.      Findings: No rash.   Neurological:      General: No focal deficit present.      Mental Status: He is alert.   Psychiatric:         Behavior: Behavior normal.         Thought Content: Thought content normal.          Lab Results   Component Value Date    PSA 0.275 12/12/2018        CMP  Sodium   Date Value Ref Range Status   03/06/2025 142 136 - 145 mmol/L Final     Potassium   Date Value Ref Range Status   03/06/2025 3.8 3.5 - 5.1 mmol/L Final     Chloride   Date Value Ref Range Status   03/06/2025 108 (H) 98 - 107 mmol/L Final     CO2   Date Value Ref Range Status   03/06/2025 26 23 - 31 mmol/L Final     Glucose   Date Value Ref Range Status    03/06/2025 98 82 - 115 mg/dL Final     BUN   Date Value Ref Range Status   03/06/2025 11 8 - 26 mg/dL Final     Creatinine   Date Value Ref Range Status   03/06/2025 1.61 (H) 0.72 - 1.25 mg/dL Final     Calcium   Date Value Ref Range Status   03/06/2025 9.3 8.8 - 10.0 mg/dL Final     Total Protein   Date Value Ref Range Status   10/14/2024 7.7 6.4 - 8.2 g/dL Final     Albumin   Date Value Ref Range Status   03/06/2025 3.8 3.4 - 4.8 g/dL Final     Bilirubin, Total   Date Value Ref Range Status   10/14/2024 0.4 >0.0 - 1.2 mg/dL Final     Alk Phos   Date Value Ref Range Status   10/14/2024 81 45 - 115 U/L Final     AST   Date Value Ref Range Status   10/14/2024 19 15 - 37 U/L Final     ALT   Date Value Ref Range Status   10/14/2024 18 16 - 61 U/L Final     Anion Gap   Date Value Ref Range Status   03/06/2025 12 7 - 16 mmol/L Final     eGFR   Date Value Ref Range Status   03/06/2025 43 (L) >=60 mL/min/1.73m2 Final     Comment:     Estimated GFR calculated using the CKD-EPI creatinine (2021) equation.      BMP  Lab Results   Component Value Date     03/06/2025    K 3.8 03/06/2025     (H) 03/06/2025    CO2 26 03/06/2025    BUN 11 03/06/2025    CREATININE 1.61 (H) 03/06/2025    CALCIUM 9.3 03/06/2025    ANIONGAP 12 03/06/2025    EGFRNORACEVR 43 (L) 03/06/2025              [1]   Current Outpatient Medications on File Prior to Visit   Medication Sig Dispense Refill    amLODIPine (NORVASC) 5 MG tablet Take 5 mg by mouth once daily.      cloNIDine (CATAPRES) 0.1 MG tablet Take 0.1 mg by mouth every 4 (four) hours as needed (take for elevated BP. no more than 6 times a day).      hydrALAZINE (APRESOLINE) 50 MG tablet Take 50 mg by mouth every 12 (twelve) hours.      HYDROcodone-acetaminophen (NORCO) 7.5-325 mg per tablet Take 1 tablet by mouth 2 (two) times daily as needed.      NIFEdipine (PROCARDIA-XL) 60 MG (OSM) 24 hr tablet Take 60 mg by mouth once daily.      potassium chloride (MICRO-K) 10 MEQ CpSR Take 1  capsule (10 mEq total) by mouth once daily. 90 capsule 3    traZODone (DESYREL) 100 MG tablet Take 1 tablet (100 mg total) by mouth every evening. 90 tablet 3    valsartan (DIOVAN) 320 MG tablet Take 320 mg by mouth once daily.      ALPRAZolam (XANAX) 0.5 MG tablet Take 1 tablet (0.5 mg total) by mouth 3 (three) times daily as needed for Anxiety. 90 tablet 2    empagliflozin (JARDIANCE) 25 mg tablet Take 1 tablet (25 mg total) by mouth once daily. (Patient not taking: Reported on 3/24/2025) 90 tablet 3    hydroCHLOROthiazide (HYDRODIURIL) 12.5 MG Tab Take 1 tablet (12.5 mg total) by mouth once daily. 90 tablet 3     Current Facility-Administered Medications on File Prior to Visit   Medication Dose Route Frequency Provider Last Rate Last Admin    LIDOcaine HCl 2% urojet   Urethral 1 time in Clinic/HOD Ron Tabor MD

## 2025-03-28 DIAGNOSIS — F32.A DEPRESSION, UNSPECIFIED DEPRESSION TYPE: ICD-10-CM

## 2025-03-28 RX ORDER — TRAZODONE HYDROCHLORIDE 100 MG/1
100 TABLET ORAL NIGHTLY
Qty: 90 TABLET | Refills: 3 | Status: SHIPPED | OUTPATIENT
Start: 2025-03-28

## 2025-03-31 ENCOUNTER — HOSPITAL ENCOUNTER (OUTPATIENT)
Dept: RADIOLOGY | Facility: HOSPITAL | Age: 80
Discharge: HOME | End: 2025-03-31
Attending: UROLOGY
Payer: MEDICARE

## 2025-03-31 ENCOUNTER — OFFICE VISIT (OUTPATIENT)
Dept: UROLOGY | Facility: CLINIC | Age: 80
End: 2025-03-31
Payer: MEDICARE

## 2025-03-31 DIAGNOSIS — N13.5 URETERAL OBSTRUCTION, LEFT: ICD-10-CM

## 2025-03-31 DIAGNOSIS — N13.39 OTHER HYDRONEPHROSIS: ICD-10-CM

## 2025-03-31 DIAGNOSIS — N17.9 ACUTE RENAL FAILURE, UNSPECIFIED ACUTE RENAL FAILURE TYPE: ICD-10-CM

## 2025-03-31 DIAGNOSIS — N13.30 BILATERAL HYDRONEPHROSIS: ICD-10-CM

## 2025-03-31 DIAGNOSIS — N28.9 DECREASED RENAL FUNCTION: ICD-10-CM

## 2025-03-31 DIAGNOSIS — C67.9 PRIMARY MALIGNANT NEOPLASM OF BLADDER: Primary | ICD-10-CM

## 2025-03-31 PROCEDURE — 78707 K FLOW/FUNCT IMAGE W/O DRUG: CPT | Mod: 26,,, | Performed by: STUDENT IN AN ORGANIZED HEALTH CARE EDUCATION/TRAINING PROGRAM

## 2025-03-31 PROCEDURE — A9562 TC99M MERTIATIDE: HCPCS | Performed by: UROLOGY

## 2025-03-31 PROCEDURE — 99214 OFFICE O/P EST MOD 30 MIN: CPT | Mod: S$PBB,,, | Performed by: UROLOGY

## 2025-03-31 PROCEDURE — 63600175 PHARM REV CODE 636 W HCPCS: Performed by: UROLOGY

## 2025-03-31 PROCEDURE — 99213 OFFICE O/P EST LOW 20 MIN: CPT | Mod: PBBFAC,25 | Performed by: UROLOGY

## 2025-03-31 PROCEDURE — 99999 PR PBB SHADOW E&M-EST. PATIENT-LVL III: CPT | Mod: PBBFAC,,, | Performed by: UROLOGY

## 2025-03-31 PROCEDURE — 78707 K FLOW/FUNCT IMAGE W/O DRUG: CPT | Mod: TC

## 2025-03-31 RX ORDER — FUROSEMIDE 10 MG/ML
40 INJECTION INTRAMUSCULAR; INTRAVENOUS ONCE
Status: COMPLETED | OUTPATIENT
Start: 2025-03-31 | End: 2025-03-31

## 2025-03-31 RX ORDER — BETIATIDE 1 MG/1
5.2 INJECTION, POWDER, LYOPHILIZED, FOR SOLUTION INTRAVENOUS
Status: COMPLETED | OUTPATIENT
Start: 2025-03-31 | End: 2025-03-31

## 2025-03-31 RX ADMIN — TECHNESCAN TC 99M MERTIATIDE 5.2 MILLICURIE: 1 INJECTION, POWDER, LYOPHILIZED, FOR SOLUTION INTRAVENOUS at 12:03

## 2025-03-31 RX ADMIN — FUROSEMIDE 40 MG: 10 INJECTION, SOLUTION INTRAVENOUS at 12:03

## 2025-03-31 NOTE — PROGRESS NOTES
Spoke with Cookie in radiology, Radiologist will not be on campus until 4/15, she recommended patient be sent to [facility].  Dr. Tabor was notified, he agreed.  Spoke with Javon in IR at [facility].  He scheduled patient for in the am on 4/1/25 at 7:30, instructed for patient to not eat or drink anything after midnight and check in at the same day surgery department.   Patient was notified, he verbalized understanding.  Order with patient info faxed to [phone]

## 2025-03-31 NOTE — PROGRESS NOTES
Assessment:   1. Primary malignant neoplasm of bladder  -     Basic Metabolic Panel; Future; Expected date: 03/31/2025  -     US Guided Vascular Access; Future; Expected date: 03/31/2025  -     IR Nephrostomy Tube Placement; Future; Expected date: 03/31/2025    2. Ureteral obstruction, left  -     US Guided Vascular Access; Future; Expected date: 03/31/2025  -     IR Nephrostomy Tube Placement; Future; Expected date: 03/31/2025    3. Bilateral hydronephrosis  -     Basic Metabolic Panel; Future; Expected date: 03/31/2025  -     US Guided Vascular Access; Future; Expected date: 03/31/2025  -     IR Nephrostomy Tube Placement; Future; Expected date: 03/31/2025    4. Decreased renal function  -     Protime-INR; Future; Expected date: 03/31/2025  -     US Guided Vascular Access; Future; Expected date: 03/31/2025  -     IR Nephrostomy Tube Placement; Future; Expected date: 03/31/2025    5. Acute renal failure, unspecified acute renal failure type  -     Protime-INR; Future; Expected date: 03/31/2025  -     IR Nephrostomy Tube Placement; Future; Expected date: 03/31/2025         Plan:     We reviewed drainage curves of the right and the left kidney as well as the calculated T 1/2 and the left kidney is obstructed.  We discussed based on the previous cystoscopy that retrograde ureteral stent placement was not possible based on abnormal anatomy and I recommended left percutaneous nephrostomy.  We discussed the purpose of the nephrostomy is to drain the kidney and that ultimately the goal will be able to perform antegrade ureteral stent placement as management of his obstruction but that he may require nephrostomy long-term if the distal ureter is not passable. We have made arrangements for percutaneous nephrostomy through our Interventional Radiology cooperation.             Ron Tabor MD  Urology  03/31/2025          UROLOGY HISTORY AND PHYSICAL EXAM    Subjective:      Patient ID: Ruben Figueroa is a 79 y.o.  male.    Chief Complaint::   HPI    The patient is an 79-year-old male with a history of bladder cancer that presented with cystoscopy which demonstrated an abnormal bladder with calcifications.  The ureteral orifice on the left was not visualized and the patient underwent CT scan and ultimately nuclear medicine Lasix renogram which demonstrated obstruction on the left.  He presents today for follow-up and reports that he is asymptomatic with no pain on the left.  Today is accompanied by his wife who reports that he struggles with memory loss.     Past Medical History:   Diagnosis Date    BPH (benign prostatic hyperplasia)     Chronic prostatitis     Hyperlipidemia     Hypertension     Insomnia     Malignant neoplasm of bladder     Sciatica      Past Surgical History:   Procedure Laterality Date    BLADDER TUMOR EXCISION      skin cancer removed      TURBT (TRANSURETHRAL RESECTION OF BLADDER TUMOR) N/A 10/4/2023    Procedure: TURBT (TRANSURETHRAL RESECTION OF BLADDER TUMOR);  Surgeon: Terrence Licea Jr., MD;  Location: [facility];  Service: Urology;  Laterality: N/A;    TURBT (TRANSURETHRAL RESECTION OF BLADDER TUMOR) N/A 9/5/2024    Procedure: TURBT (TRANSURETHRAL RESECTION OF BLADDER TUMOR);  Surgeon: Terrence Licea Jr., MD;  Location: [facility] OR;  Service: Urology;  Laterality: N/A;        Medications Ordered Prior to Encounter[1]     Review of patient's allergies indicates:   Allergen Reactions    Doxycycline hyclate     Penicillins      There were no vitals filed for this visit.       Review of Systems   Constitutional:  Negative for fever.   Gastrointestinal:  Negative for abdominal distention, abdominal pain, nausea and vomiting.   Genitourinary:  Negative for decreased urine volume and flank pain.   Musculoskeletal:  Positive for arthralgias. Negative for back pain.   Skin:  Negative for rash.   Hematological:  Negative for adenopathy.      Objective:     Physical Exam  Vitals and nursing note  reviewed.   Constitutional:       Appearance: Normal appearance. He is normal weight.   HENT:      Head: Normocephalic and atraumatic.   Eyes:      General: No scleral icterus.     Conjunctiva/sclera: Conjunctivae normal.   Cardiovascular:      Rate and Rhythm: Normal rate.   Pulmonary:      Effort: Pulmonary effort is normal. No respiratory distress.      Breath sounds: No wheezing.   Abdominal:      General: There is no distension.      Tenderness: There is no abdominal tenderness.   Musculoskeletal:         General: No deformity.   Skin:     General: Skin is warm and dry.      Findings: No rash.   Neurological:      Mental Status: He is alert. Mental status is at baseline.   Psychiatric:         Behavior: Behavior normal.        Lab Results   Component Value Date    PSA 0.111 03/24/2025    PSA 0.275 12/12/2018        CMP  Sodium   Date Value Ref Range Status   03/24/2025 142 136 - 145 mmol/L Final     Potassium   Date Value Ref Range Status   03/24/2025 3.5 3.5 - 5.1 mmol/L Final     Chloride   Date Value Ref Range Status   03/24/2025 109 (H) 98 - 107 mmol/L Final     CO2   Date Value Ref Range Status   03/24/2025 24 23 - 31 mmol/L Final     Glucose   Date Value Ref Range Status   03/24/2025 91 82 - 115 mg/dL Final     BUN   Date Value Ref Range Status   03/24/2025 10 8 - 26 mg/dL Final     Creatinine   Date Value Ref Range Status   03/24/2025 1.34 (H) 0.72 - 1.25 mg/dL Final     Calcium   Date Value Ref Range Status   03/24/2025 9.3 8.8 - 10.0 mg/dL Final     Total Protein   Date Value Ref Range Status   10/14/2024 7.7 6.4 - 8.2 g/dL Final     Albumin   Date Value Ref Range Status   03/06/2025 3.8 3.4 - 4.8 g/dL Final     Bilirubin, Total   Date Value Ref Range Status   10/14/2024 0.4 >0.0 - 1.2 mg/dL Final     Alk Phos   Date Value Ref Range Status   10/14/2024 81 45 - 115 U/L Final     AST   Date Value Ref Range Status   10/14/2024 19 15 - 37 U/L Final     ALT   Date Value Ref Range Status   10/14/2024 18 16 -  61 U/L Final     Anion Gap   Date Value Ref Range Status   03/24/2025 13 7 - 16 mmol/L Final     eGFR   Date Value Ref Range Status   03/24/2025 53 (L) >=60 mL/min/1.73m2 Final     Comment:     Estimated GFR calculated using the CKD-EPI creatinine (2021) equation.      BMP  Lab Results   Component Value Date     03/24/2025    K 3.5 03/24/2025     (H) 03/24/2025    CO2 24 03/24/2025    BUN 10 03/24/2025    CREATININE 1.34 (H) 03/24/2025    CALCIUM 9.3 03/24/2025    ANIONGAP 13 03/24/2025    EGFRNORACEVR 53 (L) 03/24/2025              [1]   Current Outpatient Medications on File Prior to Visit   Medication Sig Dispense Refill    ALPRAZolam (XANAX) 0.5 MG tablet Take 1 tablet (0.5 mg total) by mouth 3 (three) times daily as needed for Anxiety. 90 tablet 2    amLODIPine (NORVASC) 5 MG tablet Take 5 mg by mouth once daily.      cloNIDine (CATAPRES) 0.1 MG tablet Take 0.1 mg by mouth every 4 (four) hours as needed (take for elevated BP. no more than 6 times a day).      empagliflozin (JARDIANCE) 25 mg tablet Take 1 tablet (25 mg total) by mouth once daily. (Patient not taking: Reported on 3/24/2025) 90 tablet 3    hydrALAZINE (APRESOLINE) 50 MG tablet Take 50 mg by mouth every 12 (twelve) hours.      hydroCHLOROthiazide (HYDRODIURIL) 12.5 MG Tab Take 1 tablet (12.5 mg total) by mouth once daily. 90 tablet 3    HYDROcodone-acetaminophen (NORCO) 7.5-325 mg per tablet Take 1 tablet by mouth 2 (two) times daily as needed.      NIFEdipine (PROCARDIA-XL) 60 MG (OSM) 24 hr tablet Take 60 mg by mouth once daily.      potassium chloride (MICRO-K) 10 MEQ CpSR Take 1 capsule (10 mEq total) by mouth once daily. 90 capsule 3    traZODone (DESYREL) 100 MG tablet TAKE 1 TABLET BY MOUTH EVERY EVENING. 90 tablet 3    valsartan (DIOVAN) 320 MG tablet Take 320 mg by mouth once daily.       Current Facility-Administered Medications on File Prior to Visit   Medication Dose Route Frequency Provider Last Rate Last Admin    LIDOcaine  HCl 2% urojet   Urethral 1 time in Clinic/HOD Ron Tabor MD

## 2025-03-31 NOTE — PATIENT INSTRUCTIONS
You have been scheduled with [facility]'s radiology for left nephrostomy tube insertion for in the am at 7:30 4/1/25.  Do not eat or drink anything after midnight tonight and you will arrive at the same day surgery to be admitted. Please call [phone] for any further questions.

## 2025-04-08 ENCOUNTER — CLINICAL SUPPORT (OUTPATIENT)
Dept: CARDIOLOGY | Facility: CLINIC | Age: 80
End: 2025-04-08
Payer: MEDICARE

## 2025-04-08 ENCOUNTER — HOSPITAL ENCOUNTER (OUTPATIENT)
Dept: RADIOLOGY | Facility: HOSPITAL | Age: 80
Discharge: HOME | End: 2025-04-08
Attending: UROLOGY
Payer: MEDICARE

## 2025-04-08 ENCOUNTER — OFFICE VISIT (OUTPATIENT)
Dept: UROLOGY | Facility: CLINIC | Age: 80
End: 2025-04-08
Payer: MEDICARE

## 2025-04-08 VITALS
HEART RATE: 47 BPM | BODY MASS INDEX: 23.77 KG/M2 | SYSTOLIC BLOOD PRESSURE: 154 MMHG | WEIGHT: 166 LBS | HEIGHT: 70 IN | DIASTOLIC BLOOD PRESSURE: 61 MMHG

## 2025-04-08 DIAGNOSIS — N39.0 URINARY TRACT INFECTION WITHOUT HEMATURIA, SITE UNSPECIFIED: ICD-10-CM

## 2025-04-08 DIAGNOSIS — N13.30 BILATERAL HYDRONEPHROSIS: ICD-10-CM

## 2025-04-08 DIAGNOSIS — Z01.810 PRE-PROCEDURAL CARDIOVASCULAR EXAMINATION: ICD-10-CM

## 2025-04-08 DIAGNOSIS — N13.30 BILATERAL HYDRONEPHROSIS: Primary | ICD-10-CM

## 2025-04-08 PROCEDURE — 93005 ELECTROCARDIOGRAM TRACING: CPT | Mod: PBBFAC | Performed by: STUDENT IN AN ORGANIZED HEALTH CARE EDUCATION/TRAINING PROGRAM

## 2025-04-08 PROCEDURE — 99999 PR PBB SHADOW E&M-EST. PATIENT-LVL V: CPT | Mod: PBBFAC,,, | Performed by: UROLOGY

## 2025-04-08 PROCEDURE — 99212 OFFICE O/P EST SF 10 MIN: CPT | Mod: PBBFAC,25

## 2025-04-08 PROCEDURE — 74018 RADEX ABDOMEN 1 VIEW: CPT | Mod: 26,,, | Performed by: RADIOLOGY

## 2025-04-08 PROCEDURE — 87086 URINE CULTURE/COLONY COUNT: CPT | Mod: ,,, | Performed by: CLINICAL MEDICAL LABORATORY

## 2025-04-08 PROCEDURE — 99215 OFFICE O/P EST HI 40 MIN: CPT | Mod: PBBFAC,25 | Performed by: UROLOGY

## 2025-04-08 PROCEDURE — 74018 RADEX ABDOMEN 1 VIEW: CPT | Mod: TC

## 2025-04-08 PROCEDURE — 99999 PR PBB SHADOW E&M-EST. PATIENT-LVL II: CPT | Mod: PBBFAC,,,

## 2025-04-08 PROCEDURE — 93010 ELECTROCARDIOGRAM REPORT: CPT | Mod: S$PBB,,, | Performed by: STUDENT IN AN ORGANIZED HEALTH CARE EDUCATION/TRAINING PROGRAM

## 2025-04-08 NOTE — PATIENT INSTRUCTIONS
Your surgery is scheduled for Thursday 4/10/25 at [facility] in [place].    Labwork (1st floor clinic)   EKG      (2nd floor clinic)  [facility]        Our office will contact you the day before surgery with your arrival time.  Do not eat or drink anything after midnight the night before surgery (this includes gum, candy, chewing tobacco, etc).  Bring all medication in their original bottles.  Bathe with Hibiclens the night or morning before your surgery.  The morning of your surgery ONLY take blood pressure, heart, acid reflux, or thyroid (if you take a morning dose) medication.  Take these medications with a sip of water.  Bring your C-Pap machine if you have one.  All jewelry, piercings, or false eyelashes MUST be removed prior to surgery.   You will be called Wednesday afternoon with a time to arrive

## 2025-04-08 NOTE — PROGRESS NOTES
Patient came in today for urine collection for culture and pre procedure work up.  Dr. Tabor gave orders to disconnect each nephrostomy tube and collect urine specimen and send for culture, this was done as instructed, patient tolerated well.  Dr. Tabor also instructed to have patient get and EKG, KUB, CBC and BMP labs today for nephrostogram this Thursday.   Orders placed in EPIC, patient and wife were instructed, they verbalized understanding.

## 2025-04-09 NOTE — PROGRESS NOTES
Urine cultures blood work and radiographs were performed today in anticipation for antegrade nephrostograms in hopes of surgical planning for antegrade ureteral stent placement.

## 2025-04-10 ENCOUNTER — ANESTHESIA (OUTPATIENT)
Dept: SURGERY | Facility: HOSPITAL | Age: 80
End: 2025-04-10

## 2025-04-10 ENCOUNTER — ANESTHESIA EVENT (OUTPATIENT)
Dept: SURGERY | Facility: HOSPITAL | Age: 80
End: 2025-04-10

## 2025-04-10 ENCOUNTER — HOSPITAL ENCOUNTER (OUTPATIENT)
Facility: HOSPITAL | Age: 80
Discharge: HOME | End: 2025-04-10
Attending: UROLOGY | Admitting: UROLOGY
Payer: MEDICARE

## 2025-04-10 VITALS
OXYGEN SATURATION: 96 % | HEIGHT: 70 IN | WEIGHT: 170 LBS | BODY MASS INDEX: 24.34 KG/M2 | SYSTOLIC BLOOD PRESSURE: 122 MMHG | DIASTOLIC BLOOD PRESSURE: 57 MMHG | HEART RATE: 48 BPM | TEMPERATURE: 98 F | RESPIRATION RATE: 16 BRPM

## 2025-04-10 DIAGNOSIS — N13.30 BILATERAL HYDRONEPHROSIS: ICD-10-CM

## 2025-04-10 LAB
UA COMPLETE W REFLEX CULTURE PNL UR: NO GROWTH
UA COMPLETE W REFLEX CULTURE PNL UR: NO GROWTH

## 2025-04-10 PROCEDURE — 25500020 PHARM REV CODE 255: Performed by: RADIOLOGY PRACTITIONER ASSISTANT

## 2025-04-10 PROCEDURE — 50431 NJX PX NFROSGRM &/URTRGRM: CPT | Mod: 50,,, | Performed by: UROLOGY

## 2025-04-10 PROCEDURE — 50431 NJX PX NFROSGRM &/URTRGRM: CPT | Mod: 50

## 2025-04-10 RX ORDER — IOPAMIDOL 612 MG/ML
30 INJECTION, SOLUTION INTRAVASCULAR
Status: COMPLETED | OUTPATIENT
Start: 2025-04-10 | End: 2025-04-10

## 2025-04-10 RX ORDER — LEVOFLOXACIN 500 MG/1
500 TABLET, FILM COATED ORAL DAILY
Qty: 5 TABLET | Refills: 0 | Status: SHIPPED | OUTPATIENT
Start: 2025-04-10 | End: 2025-04-15

## 2025-04-10 RX ORDER — SODIUM CHLORIDE 9 MG/ML
INJECTION, SOLUTION INTRAVENOUS CONTINUOUS
Status: DISCONTINUED | OUTPATIENT
Start: 2025-04-10 | End: 2025-04-11 | Stop reason: HOSPADM

## 2025-04-10 RX ADMIN — IOPAMIDOL 30 ML: 612 INJECTION, SOLUTION INTRAVENOUS at 09:04

## 2025-04-10 NOTE — ANESTHESIA PREPROCEDURE EVALUATION
04/10/2025  Ruben Figueroa is a 79 y.o., male.      Pre-op Assessment    I have reviewed the Patient Summary Reports.     I have reviewed the Nursing Notes. I have reviewed the NPO Status.   I have reviewed the Medications.     Review of Systems  Anesthesia Hx:  No problems with previous Anesthesia             Denies Family Hx of Anesthesia complications.    Denies Personal Hx of Anesthesia complications.                    Social:  Non-Smoker, No Alcohol Use       Cardiovascular:  Exercise tolerance: good   Hypertension              ECG has been reviewed.                            Renal/:  Chronic Renal Disease, CKD   Bilateral hydronephrosis [N13.30]             Neurological:    Neuromuscular Disease,                                     Past Medical History:   Diagnosis Date    BPH (benign prostatic hyperplasia)     Chronic prostatitis     Hyperlipidemia     Hypertension     Insomnia     Malignant neoplasm of bladder     Sciatica      Past Surgical History:   Procedure Laterality Date    BLADDER TUMOR EXCISION      skin cancer removed      TURBT (TRANSURETHRAL RESECTION OF BLADDER TUMOR) N/A 10/4/2023    Procedure: TURBT (TRANSURETHRAL RESECTION OF BLADDER TUMOR);  Surgeon: Terrence Licea Jr., MD;  Location: [facility] OR;  Service: Urology;  Laterality: N/A;    TURBT (TRANSURETHRAL RESECTION OF BLADDER TUMOR) N/A 9/5/2024    Procedure: TURBT (TRANSURETHRAL RESECTION OF BLADDER TUMOR);  Surgeon: Terrence Licea Jr., MD;  Location: [facility] OR;  Service: Urology;  Laterality: N/A;         Physical Exam  General: Well nourished, Cooperative and Alert    Airway:  Mallampati: II   Mouth Opening: Normal  TM Distance: Normal  Tongue: Normal  Neck ROM: Normal ROM    Dental:  Intact    Chest/Lungs:  Clear to auscultation, Normal Respiratory Rate    Heart:  Rate: Normal  Rhythm: Regular Rhythm        Chemistry         Component Value Date/Time     04/08/2025 1140    K 3.6 04/08/2025 1140     04/08/2025 1140    CO2 25 04/08/2025 1140    BUN 12 04/08/2025 1140    CREATININE 1.12 04/08/2025 1140    GLU 95 04/08/2025 1140        Component Value Date/Time    CALCIUM 9.0 04/08/2025 1140    ALKPHOS 81 10/14/2024 1015    AST 19 10/14/2024 1015    ALT 18 10/14/2024 1015    BILITOT 0.4 10/14/2024 1015    ESTGFRAFRICA 86 08/19/2020 0710    EGFRNONAA 73 03/07/2022 1242        Lab Results   Component Value Date    WBC 8.42 04/08/2025    HGB 13.7 04/08/2025    HCT 41.3 04/08/2025     04/08/2025     Results for orders placed or performed during the hospital encounter of 10/04/23   EKG 12-lead    Collection Time: 10/04/23  7:25 AM    Narrative    Test Reason : Z01.810,    Vent. Rate : 040 BPM     Atrial Rate : 040 BPM     P-R Int : 154 ms          QRS Dur : 088 ms      QT Int : 532 ms       P-R-T Axes : 072 085 -17 degrees     QTc Int : 433 ms    Marked sinus bradycardia  ST and T wave abnormality, consider inferior ischemia  Abnormal ECG  When compared with ECG of 07-MAR-2022 13:19,  Inverted T waves have replaced nonspecific T wave abnormality in Inferior  leads  Confirmed by Zeina JEAN-BAPITSTE, Jama TAYLOR ([id number]) on 10/25/2023 10:10:51 AM    Referred By: OSWALDO OLIVEIRA           Confirmed By:Jama Pastrana MD         Anesthesia Plan  Type of Anesthesia, risks & benefits discussed:    Anesthesia Type: Gen ETT, Gen Supraglottic Airway  Intra-op Monitoring Plan: Standard ASA Monitors  Post Op Pain Control Plan: multimodal analgesia  Induction:  IV  Airway Plan: Direct, Post-Induction  Informed Consent: Informed consent signed with the Patient and all parties understand the risks and agree with anesthesia plan.  All questions answered.   ASA Score: 2  Day of Surgery Review of History & Physical: H&P Update referred to the surgeon/provider.I have interviewed and examined the patient. I have reviewed the patient's H&P dated:  There are no significant changes.     Ready For Surgery From Anesthesia Perspective.     .

## 2025-04-10 NOTE — PROCEDURES
DATE OF PROCEDURE: 4/10/25      PREOPERATIVE DIAGNOSIS:    Bilateral nephrostomy catheters in place  History of Bladder Cancer     POSTOPERATIVE DIAGNOSIS: Same as above      PROCEDURE PERFORMED: Bilateral antegrade nephrostograms, 22406      CONTRAST: 35 ml of nonionic contrast     TECHNIQUE:  The right nephrostomy tube was disconnected from gravity drainage. 15ml of Contrast was injected. The nephrostomy was in position and contrast was seen in the right ureter and contrast was seen flowing in the bladder. There were no filling defects in the ureter or renal pelvis.     The left nephrostomy was disconnected from gravity drainage. 20ml of Contrast was injected. The proximal ureter was tortuous. Contrast was seen going down the left ureter. Magnification was performed and there was seen a jet stream of contrast entering the bladder consistent with ureteral stricture and narrowing but eventual communication with the bladder.     The catheters were secured with a new dressing. The catheters were reconnected to gravity drainage and the patient was released. There were no complications.     IMPRESSION:  Antegrade nephrostograms demonstrated that contrast freely drained into the urinary bladder from the right kidney without evidence of stricture or obstruction on the right, but there was narrowing on the left consistent with ureteral stricture or possible obstruction at the ureteral orifice which is most likely the site of obstruction.       DISPOSITION:   The procedure was well tolerated, and the patient was discharged from the radiology suite in satisfactory condition. I will discuss the patient with interventional radiology and see if there is a possibility of placing bilateral nephroureteral stents (internal/external stents) with plans to eventually internalize as long term management.

## 2025-04-10 NOTE — H&P
Assessment:   1. Bilateral hydronephrosis    Other orders  -     Place in Outpatient; Standing  -     0.9% NaCl infusion  -     [facility] nursing order (specify); Standing  -     Verify informed consent; Standing  -     Place in Outpatient; Standing  -     IR Nephrostogram through Existing access; Standing  -     FL Limited Non-Billable; Standing         Plan:     The patient has tolerated the nephrostomy catheters in place and is draining well and today we will perform bilateral antegrade nephrostogram to see if we can identify the extent of obstruction  and surgically plan for internalization of the nephrostomy catheters as there might be a possibility of antegrade ureteral stent placement.       Ron Tabor MD  Urology  04/10/2025          UROLOGY HISTORY AND PHYSICAL EXAM    Subjective:      Patient ID: Ruben Figueroa is a 79 y.o. male.    Chief Complaint::   HPI    The patient is an 79-year-old male with a history of bladder cancer that has developed abnormal calcifications in the bladder and bilateral ureteral obstruction that is currently managed with bilateral percutaneous nephrostomy catheters.  The patient presents today with his wife and reports that they are draining yellow urine and that surprisingly he has tolerated them well and is doing well.       Past Medical History:   Diagnosis Date    BPH (benign prostatic hyperplasia)     Chronic prostatitis     Hyperlipidemia     Hypertension     Insomnia     Malignant neoplasm of bladder     Sciatica      Past Surgical History:   Procedure Laterality Date    BLADDER TUMOR EXCISION      skin cancer removed      TURBT (TRANSURETHRAL RESECTION OF BLADDER TUMOR) N/A 10/4/2023    Procedure: TURBT (TRANSURETHRAL RESECTION OF BLADDER TUMOR);  Surgeon: Terrence Licea Jr., MD;  Location: [facility];  Service: Urology;  Laterality: N/A;    TURBT (TRANSURETHRAL RESECTION OF BLADDER TUMOR) N/A 9/5/2024    Procedure: TURBT (TRANSURETHRAL RESECTION OF BLADDER TUMOR);   Surgeon: Terrence Licea Jr., MD;  Location: [facility];  Service: Urology;  Laterality: N/A;        Medications Ordered Prior to Encounter[1]     Review of patient's allergies indicates:   Allergen Reactions    Doxycycline hyclate     Penicillins      Vitals:    04/10/25 0754   BP: (!) 122/57   Pulse: (!) 48   Resp: 16   Temp: 97.9 °F (36.6 °C)          Review of Systems   Respiratory:  Negative for shortness of breath.    Cardiovascular:  Negative for chest pain and leg swelling.   Gastrointestinal:  Negative for abdominal distention and abdominal pain.   Genitourinary:  Negative for decreased urine volume and hematuria.   Skin:  Negative for wound.   Neurological:  Negative for headaches.        Objective:     Physical Exam  Vitals and nursing note reviewed.   Constitutional:       Appearance: Normal appearance. He is normal weight.   HENT:      Head: Normocephalic and atraumatic.   Eyes:      General: No scleral icterus.     Conjunctiva/sclera: Conjunctivae normal.   Cardiovascular:      Rate and Rhythm: Normal rate.   Pulmonary:      Effort: No respiratory distress.      Breath sounds: No wheezing.   Abdominal:      General: There is no distension.      Palpations: Abdomen is soft.      Tenderness: There is no abdominal tenderness.      Comments: Bilateral percutaneous nephrostomy catheters in place.  Both draining yellow urine.    Skin:     General: Skin is warm and dry.      Findings: No rash.   Neurological:      General: No focal deficit present.      Mental Status: He is alert.   Psychiatric:         Behavior: Behavior normal.          Lab Results   Component Value Date    PSA 0.111 03/24/2025    PSA 0.275 12/12/2018        CMP  Sodium   Date Value Ref Range Status   04/08/2025 139 136 - 145 mmol/L Final     Potassium   Date Value Ref Range Status   04/08/2025 3.6 3.5 - 5.1 mmol/L Final     Chloride   Date Value Ref Range Status   04/08/2025 104 98 - 107 mmol/L Final     CO2   Date Value Ref Range Status    04/08/2025 25 23 - 31 mmol/L Final     Glucose   Date Value Ref Range Status   04/08/2025 95 82 - 115 mg/dL Final     BUN   Date Value Ref Range Status   04/08/2025 12 8 - 26 mg/dL Final     Creatinine   Date Value Ref Range Status   04/08/2025 1.12 0.72 - 1.25 mg/dL Final     Calcium   Date Value Ref Range Status   04/08/2025 9.0 8.8 - 10.0 mg/dL Final     Total Protein   Date Value Ref Range Status   10/14/2024 7.7 6.4 - 8.2 g/dL Final     Albumin   Date Value Ref Range Status   03/06/2025 3.8 3.4 - 4.8 g/dL Final     Bilirubin, Total   Date Value Ref Range Status   10/14/2024 0.4 >0.0 - 1.2 mg/dL Final     Alk Phos   Date Value Ref Range Status   10/14/2024 81 45 - 115 U/L Final     AST   Date Value Ref Range Status   10/14/2024 19 15 - 37 U/L Final     ALT   Date Value Ref Range Status   10/14/2024 18 16 - 61 U/L Final     Anion Gap   Date Value Ref Range Status   04/08/2025 14 7 - 16 mmol/L Final     eGFR   Date Value Ref Range Status   04/08/2025 66 >=60 mL/min/1.73m2 Final     Comment:     Estimated GFR calculated using the CKD-EPI creatinine (2021) equation.      BMP  Lab Results   Component Value Date     04/08/2025    K 3.6 04/08/2025     04/08/2025    CO2 25 04/08/2025    BUN 12 04/08/2025    CREATININE 1.12 04/08/2025    CALCIUM 9.0 04/08/2025    ANIONGAP 14 04/08/2025    EGFRNORACEVR 66 04/08/2025              [1]   No current facility-administered medications on file prior to encounter.     Current Outpatient Medications on File Prior to Encounter   Medication Sig Dispense Refill    valsartan (DIOVAN) 320 MG tablet Take 320 mg by mouth once daily.      ALPRAZolam (XANAX) 0.5 MG tablet Take 1 tablet (0.5 mg total) by mouth 3 (three) times daily as needed for Anxiety. 90 tablet 2    amLODIPine (NORVASC) 5 MG tablet Take 5 mg by mouth once daily.      cloNIDine (CATAPRES) 0.1 MG tablet Take 0.1 mg by mouth every 4 (four) hours as needed (take for elevated BP. no more than 6 times a day).       empagliflozin (JARDIANCE) 25 mg tablet Take 1 tablet (25 mg total) by mouth once daily. (Patient not taking: Reported on 4/8/2025) 90 tablet 3    hydrALAZINE (APRESOLINE) 50 MG tablet Take 50 mg by mouth every 12 (twelve) hours.      hydroCHLOROthiazide (HYDRODIURIL) 12.5 MG Tab Take 1 tablet (12.5 mg total) by mouth once daily. 90 tablet 3    HYDROcodone-acetaminophen (NORCO) 7.5-325 mg per tablet Take 1 tablet by mouth 2 (two) times daily as needed.      NIFEdipine (PROCARDIA-XL) 60 MG (OSM) 24 hr tablet Take 60 mg by mouth once daily.      potassium chloride (MICRO-K) 10 MEQ CpSR Take 1 capsule (10 mEq total) by mouth once daily. 90 capsule 3    traZODone (DESYREL) 100 MG tablet TAKE 1 TABLET BY MOUTH EVERY EVENING. 90 tablet 3

## 2025-04-11 LAB
OHS QRS DURATION: 86 MS
OHS QTC CALCULATION: 450 MS

## 2025-04-14 ENCOUNTER — OFFICE VISIT (OUTPATIENT)
Dept: UROLOGY | Facility: CLINIC | Age: 80
End: 2025-04-14
Payer: MEDICARE

## 2025-04-14 VITALS
HEART RATE: 63 BPM | DIASTOLIC BLOOD PRESSURE: 79 MMHG | OXYGEN SATURATION: 93 % | WEIGHT: 170 LBS | SYSTOLIC BLOOD PRESSURE: 149 MMHG | BODY MASS INDEX: 24.34 KG/M2 | HEIGHT: 70 IN

## 2025-04-14 DIAGNOSIS — N32.89 CALCIFICATION OF BLADDER: ICD-10-CM

## 2025-04-14 DIAGNOSIS — C67.9 MALIGNANT NEOPLASM OF URINARY BLADDER, UNSPECIFIED SITE: ICD-10-CM

## 2025-04-14 DIAGNOSIS — N13.30 BILATERAL HYDRONEPHROSIS: Primary | ICD-10-CM

## 2025-04-14 DIAGNOSIS — N13.5 URETERAL OBSTRUCTION, LEFT: ICD-10-CM

## 2025-04-14 PROCEDURE — 99999 PR PBB SHADOW E&M-EST. PATIENT-LVL V: CPT | Mod: PBBFAC,,, | Performed by: UROLOGY

## 2025-04-14 PROCEDURE — 85610 PROTHROMBIN TIME: CPT | Performed by: UROLOGY

## 2025-04-14 PROCEDURE — 99215 OFFICE O/P EST HI 40 MIN: CPT | Mod: PBBFAC | Performed by: UROLOGY

## 2025-04-14 PROCEDURE — 99214 OFFICE O/P EST MOD 30 MIN: CPT | Mod: S$PBB,,, | Performed by: UROLOGY

## 2025-04-14 NOTE — PROGRESS NOTES
Assessment:   1. Bilateral hydronephrosis  -     US Guided Vascular Access; Future; Expected date: 04/17/2025  -     IR Nephrostomy Tube Change; Future; Expected date: 04/17/2025    2. Ureteral obstruction, left  -     US Guided Vascular Access; Future; Expected date: 04/17/2025  -     IR Nephrostomy Tube Change; Future; Expected date: 04/17/2025    3. Calcification of bladder  -     US Guided Vascular Access; Future; Expected date: 04/17/2025  -     IR Nephrostomy Tube Change; Future; Expected date: 04/17/2025    4. Malignant neoplasm of urinary bladder, unspecified site  -     US Guided Vascular Access; Future; Expected date: 04/17/2025  -     IR Nephrostomy Tube Change; Future; Expected date: 04/17/2025         Plan:     We discussed the antegrade nephrostogram studies and the obstruction on the left which is greater than obstruction on the right and that treatment is a multistep process and that the 1st step if it is even possible is to perform antegrade placement of a catheter across the stricture and that the left distal ureter is going to be more difficult than the right.  We discussed that the procedure is performed by expert radiologists and that the procedure is usually performed under a combination of sedation and narcotics to see if they can pass a long catheter down the ureter into the bladder which is step 1.  We discussed if this is possible that he will have bilateral right and left internal and external ureteral catheters in place and that we will leave these in place for 6 weeks to allow the distal ureter to heal around the catheter and then perform an internalization procedure from a retrograde perspective under general anesthesia.  I drew diagrams and let them know that the goal is to try to internalize the catheters eventually, but that sometimes it is not possible and if that is the case he may require 1 or both nephrostomy catheters to remain in place.  We performed blood work today.             Ron Tabor MD  Urology  04/14/2025          UROLOGY HISTORY AND PHYSICAL EXAM    Subjective:      Patient ID: Ruben Figueroa is a 79 y.o. male.    Chief Complaint::   HPI    The patient is an 79-year-old male that has a history of bladder cancer and scarring that has obliterated the ureteral orifices and caused bilateral obstruction left greater than right and presents today with the nephrostomy catheters in place for blood work and for discussion of the next procedure.  His drip specimens from the right and left nephrostomy catheters are without evidence of infection and are draining yellow urine.  He is not making any urine through the bladder/ penis and all urine is coming through the nephrostomy catheters.       Past Medical History:   Diagnosis Date    BPH (benign prostatic hyperplasia)     Chronic prostatitis     Hyperlipidemia     Hypertension     Insomnia     Malignant neoplasm of bladder     Sciatica      Past Surgical History:   Procedure Laterality Date    BLADDER TUMOR EXCISION      skin cancer removed      TURBT (TRANSURETHRAL RESECTION OF BLADDER TUMOR) N/A 10/4/2023    Procedure: TURBT (TRANSURETHRAL RESECTION OF BLADDER TUMOR);  Surgeon: Terrence Licea Jr., MD;  Location: [facility];  Service: Urology;  Laterality: N/A;    TURBT (TRANSURETHRAL RESECTION OF BLADDER TUMOR) N/A 9/5/2024    Procedure: TURBT (TRANSURETHRAL RESECTION OF BLADDER TUMOR);  Surgeon: Terrence Licea Jr., MD;  Location: [facility] OR;  Service: Urology;  Laterality: N/A;        Medications Ordered Prior to Encounter[1]     Review of patient's allergies indicates:   Allergen Reactions    Doxycycline hyclate     Penicillins      Vitals:    04/14/25 1354   BP: (!) 149/79   Pulse: 63          Review of Systems   Constitutional:  Negative for fever.   Respiratory:  Negative for shortness of breath.    Cardiovascular:  Negative for chest pain and leg swelling.   Gastrointestinal:  Negative for abdominal  pain.   Genitourinary:  Negative for hematuria.   Musculoskeletal:  Negative for gait problem.   Skin:  Negative for rash.   Neurological:  Negative for numbness.      Objective:     Physical Exam  Vitals and nursing note reviewed.   Constitutional:       Appearance: Normal appearance. He is normal weight.   HENT:      Head: Normocephalic and atraumatic.   Eyes:      General: No scleral icterus.     Conjunctiva/sclera: Conjunctivae normal.   Cardiovascular:      Rate and Rhythm: Normal rate.   Pulmonary:      Effort: No respiratory distress.      Breath sounds: No wheezing.   Abdominal:      General: There is no distension.   Musculoskeletal:         General: No deformity.   Skin:     General: Skin is warm.      Findings: No erythema or rash.   Neurological:      General: No focal deficit present.      Mental Status: He is alert.   Psychiatric:         Mood and Affect: Mood normal.         Behavior: Behavior normal.        Lab Results   Component Value Date    PSA 0.111 03/24/2025    PSA 0.275 12/12/2018        CMP  Sodium   Date Value Ref Range Status   04/08/2025 139 136 - 145 mmol/L Final     Potassium   Date Value Ref Range Status   04/08/2025 3.6 3.5 - 5.1 mmol/L Final     Chloride   Date Value Ref Range Status   04/08/2025 104 98 - 107 mmol/L Final     CO2   Date Value Ref Range Status   04/08/2025 25 23 - 31 mmol/L Final     Glucose   Date Value Ref Range Status   04/08/2025 95 82 - 115 mg/dL Final     BUN   Date Value Ref Range Status   04/08/2025 12 8 - 26 mg/dL Final     Creatinine   Date Value Ref Range Status   04/08/2025 1.12 0.72 - 1.25 mg/dL Final     Calcium   Date Value Ref Range Status   04/08/2025 9.0 8.8 - 10.0 mg/dL Final     Total Protein   Date Value Ref Range Status   10/14/2024 7.7 6.4 - 8.2 g/dL Final     Albumin   Date Value Ref Range Status   03/06/2025 3.8 3.4 - 4.8 g/dL Final     Bilirubin, Total   Date Value Ref Range Status   10/14/2024 0.4 >0.0 - 1.2 mg/dL Final     Alk Phos   Date  Value Ref Range Status   10/14/2024 81 45 - 115 U/L Final     AST   Date Value Ref Range Status   10/14/2024 19 15 - 37 U/L Final     ALT   Date Value Ref Range Status   10/14/2024 18 16 - 61 U/L Final     Anion Gap   Date Value Ref Range Status   04/08/2025 14 7 - 16 mmol/L Final     eGFR   Date Value Ref Range Status   04/08/2025 66 >=60 mL/min/1.73m2 Final     Comment:     Estimated GFR calculated using the CKD-EPI creatinine (2021) equation.      BMP  Lab Results   Component Value Date     04/08/2025    K 3.6 04/08/2025     04/08/2025    CO2 25 04/08/2025    BUN 12 04/08/2025    CREATININE 1.12 04/08/2025    CALCIUM 9.0 04/08/2025    ANIONGAP 14 04/08/2025    EGFRNORACEVR 66 04/08/2025                [1]  Current Outpatient Medications on File Prior to Visit   Medication Sig Dispense Refill    amLODIPine (NORVASC) 5 MG tablet Take 5 mg by mouth once daily.      cloNIDine (CATAPRES) 0.1 MG tablet Take 0.1 mg by mouth every 4 (four) hours as needed (take for elevated BP. no more than 6 times a day).      hydrALAZINE (APRESOLINE) 50 MG tablet Take 50 mg by mouth every 12 (twelve) hours.      HYDROcodone-acetaminophen (NORCO) 7.5-325 mg per tablet Take 1 tablet by mouth 2 (two) times daily as needed.      levoFLOXacin (LEVAQUIN) 500 MG tablet Take 1 tablet (500 mg total) by mouth once daily. for 5 days 5 tablet 0    NIFEdipine (PROCARDIA-XL) 60 MG (OSM) 24 hr tablet Take 60 mg by mouth once daily.      potassium chloride (MICRO-K) 10 MEQ CpSR Take 1 capsule (10 mEq total) by mouth once daily. 90 capsule 3    traZODone (DESYREL) 100 MG tablet TAKE 1 TABLET BY MOUTH EVERY EVENING. 90 tablet 3    valsartan (DIOVAN) 320 MG tablet Take 320 mg by mouth once daily.      ALPRAZolam (XANAX) 0.5 MG tablet Take 1 tablet (0.5 mg total) by mouth 3 (three) times daily as needed for Anxiety. 90 tablet 2    empagliflozin (JARDIANCE) 25 mg tablet Take 1 tablet (25 mg total) by mouth once daily. (Patient not  taking: Reported on 3/24/2025) 90 tablet 3    hydroCHLOROthiazide (HYDRODIURIL) 12.5 MG Tab Take 1 tablet (12.5 mg total) by mouth once daily. 90 tablet 3     Current Facility-Administered Medications on File Prior to Visit   Medication Dose Route Frequency Provider Last Rate Last Admin    LIDOcaine HCl 2% urojet   Urethral 1 time in Clinic/HOD Ron Tabor MD

## 2025-04-15 DIAGNOSIS — E78.2 MIXED HYPERLIPIDEMIA: Primary | ICD-10-CM

## 2025-04-15 DIAGNOSIS — N17.9 ACUTE RENAL FAILURE, UNSPECIFIED ACUTE RENAL FAILURE TYPE: ICD-10-CM

## 2025-04-15 DIAGNOSIS — I10 PRIMARY HYPERTENSION: ICD-10-CM

## 2025-04-15 DIAGNOSIS — N13.30 BILATERAL HYDRONEPHROSIS: Primary | ICD-10-CM

## 2025-04-15 DIAGNOSIS — C67.9 MALIGNANT NEOPLASM OF URINARY BLADDER, UNSPECIFIED SITE: ICD-10-CM

## 2025-04-15 DIAGNOSIS — N32.89 CALCIFICATION OF BLADDER: ICD-10-CM

## 2025-04-17 ENCOUNTER — HOSPITAL ENCOUNTER (OUTPATIENT)
Dept: RADIOLOGY | Facility: HOSPITAL | Age: 80
Discharge: HOME | End: 2025-04-17
Attending: UROLOGY
Payer: MEDICARE

## 2025-04-17 VITALS
RESPIRATION RATE: 20 BRPM | HEIGHT: 71 IN | DIASTOLIC BLOOD PRESSURE: 79 MMHG | TEMPERATURE: 98 F | SYSTOLIC BLOOD PRESSURE: 193 MMHG | BODY MASS INDEX: 23.8 KG/M2 | HEART RATE: 41 BPM | OXYGEN SATURATION: 98 % | WEIGHT: 170 LBS

## 2025-04-17 VITALS — OXYGEN SATURATION: 99 % | DIASTOLIC BLOOD PRESSURE: 83 MMHG | SYSTOLIC BLOOD PRESSURE: 174 MMHG | HEART RATE: 50 BPM

## 2025-04-17 DIAGNOSIS — N13.5 URETERAL OBSTRUCTION, LEFT: ICD-10-CM

## 2025-04-17 DIAGNOSIS — N32.89 CALCIFICATION OF BLADDER: ICD-10-CM

## 2025-04-17 DIAGNOSIS — N17.9 ACUTE RENAL FAILURE, UNSPECIFIED ACUTE RENAL FAILURE TYPE: ICD-10-CM

## 2025-04-17 DIAGNOSIS — C67.9 MALIGNANT NEOPLASM OF URINARY BLADDER, UNSPECIFIED SITE: ICD-10-CM

## 2025-04-17 DIAGNOSIS — N13.30 BILATERAL HYDRONEPHROSIS: ICD-10-CM

## 2025-04-17 LAB
APTT PPP: 29.8 SECONDS (ref 25.2–37.3)
BASOPHILS # BLD AUTO: 0.05 K/UL (ref 0–0.2)
BASOPHILS NFR BLD AUTO: 0.6 % (ref 0–1)
DIFFERENTIAL METHOD BLD: ABNORMAL
EOSINOPHIL # BLD AUTO: 0.23 K/UL (ref 0–0.5)
EOSINOPHIL NFR BLD AUTO: 3 % (ref 1–4)
ERYTHROCYTE [DISTWIDTH] IN BLOOD BY AUTOMATED COUNT: 12.9 % (ref 11.5–14.5)
HCT VFR BLD AUTO: 40.8 % (ref 40–54)
HGB BLD-MCNC: 13.3 G/DL (ref 13.5–18)
IMM GRANULOCYTES # BLD AUTO: 0.03 K/UL (ref 0–0.04)
IMM GRANULOCYTES NFR BLD: 0.4 % (ref 0–0.4)
INR BLD: 1.01
LYMPHOCYTES # BLD AUTO: 2.48 K/UL (ref 1–4.8)
LYMPHOCYTES NFR BLD AUTO: 32 % (ref 27–41)
MCH RBC QN AUTO: 30.7 PG (ref 27–31)
MCHC RBC AUTO-ENTMCNC: 32.6 G/DL (ref 32–36)
MCV RBC AUTO: 94.2 FL (ref 80–96)
MONOCYTES # BLD AUTO: 0.61 K/UL (ref 0–0.8)
MONOCYTES NFR BLD AUTO: 7.9 % (ref 2–6)
MPC BLD CALC-MCNC: 9.6 FL (ref 9.4–12.4)
NEUTROPHILS # BLD AUTO: 4.35 K/UL (ref 1.8–7.7)
NEUTROPHILS NFR BLD AUTO: 56.1 % (ref 53–65)
NRBC # BLD AUTO: 0 X10E3/UL
NRBC, AUTO (.00): 0 %
PLATELET # BLD AUTO: 224 K/UL (ref 150–400)
PROTHROMBIN TIME: 13.2 SECONDS (ref 11.7–14.7)
RBC # BLD AUTO: 4.33 M/UL (ref 4.6–6.2)
WBC # BLD AUTO: 7.75 K/UL (ref 4.5–11)

## 2025-04-17 PROCEDURE — 36415 COLL VENOUS BLD VENIPUNCTURE: CPT | Performed by: UROLOGY

## 2025-04-17 PROCEDURE — 25000003 PHARM REV CODE 250: Performed by: RADIOLOGY

## 2025-04-17 PROCEDURE — 27201696 IR URETERAL STENT PLACEMENT

## 2025-04-17 PROCEDURE — 85730 THROMBOPLASTIN TIME PARTIAL: CPT | Performed by: RADIOLOGY

## 2025-04-17 PROCEDURE — 36415 COLL VENOUS BLD VENIPUNCTURE: CPT | Performed by: RADIOLOGY

## 2025-04-17 PROCEDURE — 85025 COMPLETE CBC W/AUTO DIFF WBC: CPT | Performed by: RADIOLOGY

## 2025-04-17 PROCEDURE — 25500020 PHARM REV CODE 255: Performed by: RADIOLOGY

## 2025-04-17 PROCEDURE — 27000525 IR URETERAL STENT PLACEMENT

## 2025-04-17 RX ORDER — IOPAMIDOL 612 MG/ML
15 INJECTION, SOLUTION INTRAVASCULAR
Status: COMPLETED | OUTPATIENT
Start: 2025-04-17 | End: 2025-04-17

## 2025-04-17 RX ORDER — DIAZEPAM 5 MG/1
5 TABLET ORAL ONCE
Status: COMPLETED | OUTPATIENT
Start: 2025-04-17 | End: 2025-04-17

## 2025-04-17 RX ORDER — DIAZEPAM 5 MG/1
5 TABLET ORAL ONCE
Status: DISCONTINUED | OUTPATIENT
Start: 2025-04-17 | End: 2025-04-18 | Stop reason: HOSPADM

## 2025-04-17 RX ADMIN — IOPAMIDOL 15 ML: 612 INJECTION, SOLUTION INTRAVENOUS at 02:04

## 2025-04-17 RX ADMIN — IOPAMIDOL 15 ML: 612 INJECTION, SOLUTION INTRAVENOUS at 01:04

## 2025-04-17 RX ADMIN — DIAZEPAM 5 MG: 5 TABLET ORAL at 12:04

## 2025-04-17 NOTE — PROGRESS NOTES
Left double-J stent/nephrostomy placement  Performed by Dr. Kvng Haynes  Procedure was explained to the patient including risks and possible complications.  Patient agreed to procedure consent is signed.  A formal timeout was called all staff present agree to patient and procedure.  The left existing nephrostomy tube was prepped with ChloraPrep and sterile field was established.  An 035 Glidewire was placed through the existing nephrostomy down to the bladder.  The existing nephrostomy tube was removed.  A 6 x 24 double-J stent was then placed over the wire with its distal pigtail seated in the bladder.  A new 8.5 [place] nephrostomy catheter was then placed over the wire with its pigtail seated in the pelvis of the left kidney.  The proximal end of the double-J stent was then pulled back into the pelvis of the left kidney.  Bilateral nephrostogram were performed and show satisfactory placement of nephrostomy catheter was and double-J stents.  The nephrostomy catheter was then secured with a Stay Fix bandage and connected to a urine drainage bag.  The patient tolerated the procedure well and there were no immediate postprocedure complications.  Total fluoroscopy time was 8 minutes 48 seconds.

## 2025-04-17 NOTE — PROGRESS NOTES
Right-sided double-J stent/nephrostomy catheter placement  Performed by Dr. Kvng Haynes  Procedure was explained to the patient including risks and possible complications.  Patient agreed to procedure consent is signed.  A formal timeout was called all staff present agree to patient and procedure.  The existing nephrostomy catheter on the right side was prepped with ChloraPrep and sterile field was established.  An 035 glidewire was passed through the existing nephrostomy catheter to the bladder.  This catheter was removed.  A 6 x 24 double-J stent was then placed over the wire with the distal pigtail curled in the bladder, a new 8.5 [place] nephrostomy catheter was then placed over the wire with its pigtail curled in the pelvis of the kidney.  The double-J stent was then pulled back with its proximal pigtail in the pelvis of the right kidney.  The catheter was then secured with a Stay Fix bandage and hooked to a urine drained bag.  The patient tolerated the procedure well there were no immediate postprocedure complications.  Total fluoroscopy time was 6 minutes 18 seconds.

## 2025-04-17 NOTE — Clinical Note
Anesthesia Type: Local Only
Gen: NAD, uncomfortable, AOx3  Head: NCAT  HEENT: EOMI, oral mucosa moist, normal conjunctiva, neck supple  Lung: CTAB, no respiratory distress  CV: rrr, no murmur, Normal perfusion  Abd: soft, ND, +diffuse TTP. +rebound. No guarding, no rigidity  MSK: No edema, no visible deformities  Neuro: No focal neurologic deficits  Skin: No rash

## 2025-04-23 ENCOUNTER — OFFICE VISIT (OUTPATIENT)
Dept: FAMILY MEDICINE | Facility: CLINIC | Age: 80
End: 2025-04-23
Payer: MEDICARE

## 2025-04-23 VITALS
SYSTOLIC BLOOD PRESSURE: 148 MMHG | HEIGHT: 71 IN | WEIGHT: 165 LBS | RESPIRATION RATE: 16 BRPM | OXYGEN SATURATION: 96 % | HEART RATE: 51 BPM | BODY MASS INDEX: 23.1 KG/M2 | TEMPERATURE: 98 F | DIASTOLIC BLOOD PRESSURE: 72 MMHG

## 2025-04-23 DIAGNOSIS — N18.31 CHRONIC KIDNEY DISEASE, STAGE 3A: ICD-10-CM

## 2025-04-23 DIAGNOSIS — E78.2 MIXED HYPERLIPIDEMIA: ICD-10-CM

## 2025-04-23 DIAGNOSIS — I10 PRIMARY HYPERTENSION: Primary | ICD-10-CM

## 2025-04-23 LAB
ALBUMIN SERPL BCP-MCNC: 3.8 G/DL (ref 3.4–4.8)
ALBUMIN/GLOB SERPL: 1.2 {RATIO}
ALP SERPL-CCNC: 75 U/L (ref 40–150)
ALT SERPL W P-5'-P-CCNC: 10 U/L
ANION GAP SERPL CALCULATED.3IONS-SCNC: 15 MMOL/L (ref 7–16)
AST SERPL W P-5'-P-CCNC: 16 U/L (ref 11–45)
BASOPHILS # BLD AUTO: 0.06 K/UL (ref 0–0.2)
BASOPHILS NFR BLD AUTO: 0.6 % (ref 0–1)
BILIRUB SERPL-MCNC: 0.6 MG/DL
BUN SERPL-MCNC: 16 MG/DL (ref 8–26)
BUN/CREAT SERPL: 14 (ref 6–20)
CALCIUM SERPL-MCNC: 9.2 MG/DL (ref 8.8–10)
CHLORIDE SERPL-SCNC: 107 MMOL/L (ref 98–107)
CHOLEST SERPL-MCNC: 192 MG/DL
CHOLEST/HDLC SERPL: 4.1 {RATIO}
CO2 SERPL-SCNC: 21 MMOL/L (ref 23–31)
CREAT SERPL-MCNC: 1.11 MG/DL (ref 0.72–1.25)
DIFFERENTIAL METHOD BLD: ABNORMAL
EGFR (NO RACE VARIABLE) (RUSH/TITUS): 67 ML/MIN/1.73M2
EOSINOPHIL # BLD AUTO: 0.33 K/UL (ref 0–0.5)
EOSINOPHIL NFR BLD AUTO: 3.5 % (ref 1–4)
ERYTHROCYTE [DISTWIDTH] IN BLOOD BY AUTOMATED COUNT: 13.2 % (ref 11.5–14.5)
GLOBULIN SER-MCNC: 3.3 G/DL (ref 2–4)
GLUCOSE SERPL-MCNC: 97 MG/DL (ref 82–115)
HCT VFR BLD AUTO: 43.4 % (ref 40–54)
HDLC SERPL-MCNC: 47 MG/DL (ref 35–60)
HGB BLD-MCNC: 14 G/DL (ref 13.5–18)
IMM GRANULOCYTES # BLD AUTO: 0.02 K/UL (ref 0–0.04)
IMM GRANULOCYTES NFR BLD: 0.2 % (ref 0–0.4)
LDLC SERPL CALC-MCNC: 122 MG/DL
LDLC/HDLC SERPL: 2.6 {RATIO}
LYMPHOCYTES # BLD AUTO: 2.64 K/UL (ref 1–4.8)
LYMPHOCYTES NFR BLD AUTO: 27.8 % (ref 27–41)
MCH RBC QN AUTO: 31 PG (ref 27–31)
MCHC RBC AUTO-ENTMCNC: 32.3 G/DL (ref 32–36)
MCV RBC AUTO: 96 FL (ref 80–96)
MONOCYTES # BLD AUTO: 0.69 K/UL (ref 0–0.8)
MONOCYTES NFR BLD AUTO: 7.3 % (ref 2–6)
MPC BLD CALC-MCNC: 10.3 FL (ref 9.4–12.4)
NEUTROPHILS # BLD AUTO: 5.77 K/UL (ref 1.8–7.7)
NEUTROPHILS NFR BLD AUTO: 60.6 % (ref 53–65)
NONHDLC SERPL-MCNC: 145 MG/DL
NRBC # BLD AUTO: 0 X10E3/UL
NRBC, AUTO (.00): 0 %
PLATELET # BLD AUTO: 264 K/UL (ref 150–400)
POTASSIUM SERPL-SCNC: 5 MMOL/L (ref 3.5–5.1)
PROT SERPL-MCNC: 7.1 G/DL (ref 5.8–7.6)
RBC # BLD AUTO: 4.52 M/UL (ref 4.6–6.2)
SODIUM SERPL-SCNC: 138 MMOL/L (ref 136–145)
TRIGL SERPL-MCNC: 117 MG/DL (ref 34–140)
VLDLC SERPL-MCNC: 23 MG/DL
WBC # BLD AUTO: 9.51 K/UL (ref 4.5–11)

## 2025-04-23 PROCEDURE — 80053 COMPREHEN METABOLIC PANEL: CPT | Mod: ,,, | Performed by: CLINICAL MEDICAL LABORATORY

## 2025-04-23 PROCEDURE — 80061 LIPID PANEL: CPT | Mod: ,,, | Performed by: CLINICAL MEDICAL LABORATORY

## 2025-04-23 PROCEDURE — 85025 COMPLETE CBC W/AUTO DIFF WBC: CPT | Mod: ,,, | Performed by: CLINICAL MEDICAL LABORATORY

## 2025-04-23 PROCEDURE — 99214 OFFICE O/P EST MOD 30 MIN: CPT | Mod: ,,, | Performed by: FAMILY MEDICINE

## 2025-04-23 NOTE — PROGRESS NOTES
Jon Gonzalez MD        PATIENT NAME: Ruben Figueroa  : 1945  DATE: 25  MRN: [id number]      Billing Provider: Jon Gonzalez MD  Level of Service: NM OFFICE/OUTPT VISIT, EST, LEVL IV, 30-39 MIN  Patient PCP Information       Provider PCP Type    Jon Gonzalez MD General            Reason for Visit / Chief Complaint: Hypertension (Check up)       Update PCP  Update Chief Complaint         History of Present Illness / Problem Focused Workflow     Ruben Figueroa presents to the clinic with Hypertension (Check up)     Routine followup.  No significant interval change      Hypertension  Pertinent negatives include no chest pain, headaches, neck pain or palpitations.     Review of Systems     Review of Systems   Constitutional:  Negative for activity change, appetite change, fever and unexpected weight change.   HENT:  Negative for congestion, rhinorrhea, sinus pressure, sinus pain, sore throat and trouble swallowing.    Eyes:  Negative for photophobia, pain, discharge and visual disturbance.   Respiratory:  Negative for cough, chest tightness, wheezing and stridor.    Cardiovascular:  Negative for chest pain, palpitations and leg swelling.   Gastrointestinal:  Negative for abdominal pain, blood in stool, constipation, diarrhea and nausea.   Endocrine: Negative for polydipsia, polyphagia and polyuria.   Genitourinary:  Positive for difficulty urinating. Negative for flank pain and hematuria.   Musculoskeletal:  Negative for arthralgias and neck pain.   Skin:  Negative for rash.   Allergic/Immunologic: Negative for food allergies.   Neurological:  Negative for dizziness, tremors, seizures, syncope, weakness (global weakness) and headaches.   Psychiatric/Behavioral:  Negative for behavioral problems, confusion, decreased concentration, dysphoric mood and hallucinations. The patient is not nervous/anxious.         Medical / Social / Family History     Past Medical History:   Diagnosis Date    BPH (benign  prostatic hyperplasia)     Chronic prostatitis     Hyperlipidemia     Hypertension     Insomnia     Malignant neoplasm of bladder     Sciatica        Past Surgical History:   Procedure Laterality Date    BLADDER TUMOR EXCISION      skin cancer removed      TURBT (TRANSURETHRAL RESECTION OF BLADDER TUMOR) N/A 10/4/2023    Procedure: TURBT (TRANSURETHRAL RESECTION OF BLADDER TUMOR);  Surgeon: Terrence Licea Jr., MD;  Location: [facility] [place];  Service: Urology;  Laterality: N/A;    TURBT (TRANSURETHRAL RESECTION OF BLADDER TUMOR) N/A 9/5/2024    Procedure: TURBT (TRANSURETHRAL RESECTION OF BLADDER TUMOR);  Surgeon: Terrence Licea Jr., MD;  Location: [facility] OR;  Service: Urology;  Laterality: N/A;       Social History    reports that he quit smoking about 35 years ago. His smoking use included cigarettes. He started smoking about 65 years ago. He has a 30 pack-year smoking history. He has been exposed to tobacco smoke. He has never used smokeless tobacco. He reports current alcohol use. He reports that he does not use drugs.    Family History  's family history includes Bladder Cancer in his father; Heart disease in his father; Hypertension in his mother.    Medications and Allergies     Medications  No outpatient medications have been marked as taking for the 4/23/25 encounter (Office Visit) with Jon Gonzalez MD.     Current Facility-Administered Medications for the 4/23/25 encounter (Office Visit) with Jon Gonzalez MD   Medication Dose Route Frequency Provider Last Rate Last Admin    LIDOcaine HCl 2% urojet   Urethral 1 time in Clinic/HOD Ron Tabor MD           Allergies  Review of patient's allergies indicates:   Allergen Reactions    Doxycycline hyclate     Penicillins        Physical Examination     Vitals:    04/23/25 1048   BP: (!) 148/72   Pulse:    Resp:    Temp:      Physical Exam  Constitutional:       General: He is not in acute distress.     Appearance: Normal appearance.   HENT:       Head: Normocephalic.      Right Ear: Tympanic membrane and ear canal normal.      Left Ear: Tympanic membrane and ear canal normal.      Nose: Nose normal.      Mouth/Throat:      Mouth: Mucous membranes are moist.      Pharynx: No oropharyngeal exudate.   Eyes:      Extraocular Movements: Extraocular movements intact.      Pupils: Pupils are equal, round, and reactive to light.   Cardiovascular:      Rate and Rhythm: Normal rate and regular rhythm.      Heart sounds: No murmur heard.  Pulmonary:      Effort: Pulmonary effort is normal.      Breath sounds: Normal breath sounds. No wheezing.   Abdominal:      General: Abdomen is flat. Bowel sounds are normal.      Palpations: Abdomen is soft.      Hernia: No hernia is present.   Musculoskeletal:         General: Normal range of motion.      Cervical back: Normal range of motion and neck supple.      Right lower leg: No edema.      Left lower leg: No edema.   Lymphadenopathy:      Cervical: No cervical adenopathy.   Skin:     General: Skin is warm and dry.      Coloration: Skin is not jaundiced.      Findings: No lesion.   Neurological:      General: No focal deficit present.      Mental Status: He is alert and oriented to person, place, and time.      Cranial Nerves: No cranial nerve deficit.      Gait: Gait normal.   Psychiatric:         Mood and Affect: Mood normal.         Behavior: Behavior normal.         Judgment: Judgment normal.          Assessment and Plan (including Health Maintenance)      Problem List  Smart Sets  Document Outside HM   :    Plan:     1. Chronic kidney disease, stage 3a  The current medical regimen is effective;  continue present plan and medications.          Health Maintenance Due   Topic Date Due    TETANUS VACCINE  Never done    Shingles Vaccine (1 of 2) Never done    RSV Vaccine (Age 60+ and Pregnant patients) (1 - 1-dose 75+ series) Never done       1. Primary hypertension  -     CBC Auto Differential  -     Comprehensive  Metabolic Panel    2. Chronic kidney disease, stage 3a    3. Mixed hyperlipidemia  -     Lipid Panel         Health Maintenance Topics with due status: Not Due       Topic Last Completion Date    Lipid Panel 04/23/2025       Future Appointments   Date Time Provider Department Center   9/8/2025 10:00 AM Giuliana Mc FNP [facility] NEPH [facility]   10/28/2025  8:30 AM Charley Kim MD [facility]        There are no Patient Instructions on file for this visit.  Follow up in about 6 months (around 10/23/2025) for routine followup.     Signature:  Jon Gonzalez MD      Date of encounter: 4/23/25

## 2025-04-24 ENCOUNTER — RESULTS FOLLOW-UP (OUTPATIENT)
Dept: FAMILY MEDICINE | Facility: CLINIC | Age: 80
End: 2025-04-24

## 2025-06-26 ENCOUNTER — TELEPHONE (OUTPATIENT)
Dept: UROLOGY | Facility: CLINIC | Age: 80
End: 2025-06-26
Payer: MEDICARE

## 2025-06-26 DIAGNOSIS — N17.9 ACUTE RENAL FAILURE, UNSPECIFIED ACUTE RENAL FAILURE TYPE: ICD-10-CM

## 2025-06-26 DIAGNOSIS — N13.30 BILATERAL HYDRONEPHROSIS: Primary | ICD-10-CM

## 2025-06-26 NOTE — TELEPHONE ENCOUNTER
Copied from CRM #[id number]. Topic: Escalation - Escalation to Leader  >> Jun 26, 2025  9:06 AM Carola wrote:  Who Called: Ruben D Gordon    Caller is requesting assistance/information from provider's office.    Symptoms (please be specific): IR Nephrostomy Tube Change      Preferred Method of Contact: Phone Call  Patient's Preferred Phone Number on File: [phone]     Additional Information: Pt. Have tubes in kidneys and needs them removed and that he stated that they been in too long. Tubes are leaking.  I called pt back and he said his nephrostomy tubes need changing out, and they are leaking.  Told him the doctor is not here this week.  Pt said if he needs to go to [place] and let the  doctor that put them in, take them out, he can.  I spoke with SOY Rose RN and he said he can change the stopcock, if that is what is leaking.  Pt states he will be here in 30 to 40 minutes.  I relayed this to SOY Rose RN.

## 2025-07-01 ENCOUNTER — HOSPITAL ENCOUNTER (OUTPATIENT)
Dept: RADIOLOGY | Facility: HOSPITAL | Age: 80
Discharge: HOME | End: 2025-07-01
Attending: UROLOGY
Payer: MEDICARE

## 2025-07-01 ENCOUNTER — OFFICE VISIT (OUTPATIENT)
Dept: UROLOGY | Facility: CLINIC | Age: 80
End: 2025-07-01
Payer: MEDICARE

## 2025-07-01 VITALS
SYSTOLIC BLOOD PRESSURE: 173 MMHG | HEART RATE: 64 BPM | OXYGEN SATURATION: 99 % | DIASTOLIC BLOOD PRESSURE: 77 MMHG | HEIGHT: 71 IN | BODY MASS INDEX: 23.1 KG/M2 | WEIGHT: 165 LBS

## 2025-07-01 DIAGNOSIS — C67.9 MALIGNANT NEOPLASM OF URINARY BLADDER, UNSPECIFIED SITE: ICD-10-CM

## 2025-07-01 DIAGNOSIS — N32.89 CALCIFICATION OF BLADDER: ICD-10-CM

## 2025-07-01 DIAGNOSIS — L22 CANDIDAL DIAPER RASH: ICD-10-CM

## 2025-07-01 DIAGNOSIS — N13.30 BILATERAL HYDRONEPHROSIS: Primary | ICD-10-CM

## 2025-07-01 DIAGNOSIS — B37.2 CANDIDAL DIAPER RASH: ICD-10-CM

## 2025-07-01 DIAGNOSIS — N13.5 URETERAL OBSTRUCTION, LEFT: ICD-10-CM

## 2025-07-01 DIAGNOSIS — Z01.818 PRE-OP TESTING: ICD-10-CM

## 2025-07-01 PROCEDURE — 99999 PR PBB SHADOW E&M-EST. PATIENT-LVL V: CPT | Mod: PBBFAC,,, | Performed by: UROLOGY

## 2025-07-01 PROCEDURE — 74018 RADEX ABDOMEN 1 VIEW: CPT | Mod: 26,,, | Performed by: RADIOLOGY

## 2025-07-01 PROCEDURE — 74018 RADEX ABDOMEN 1 VIEW: CPT | Mod: TC

## 2025-07-01 PROCEDURE — 99215 OFFICE O/P EST HI 40 MIN: CPT | Mod: PBBFAC,25 | Performed by: UROLOGY

## 2025-07-01 PROCEDURE — 99214 OFFICE O/P EST MOD 30 MIN: CPT | Mod: S$PBB,,, | Performed by: UROLOGY

## 2025-07-01 RX ORDER — CLOTRIMAZOLE AND BETAMETHASONE DIPROPIONATE 10; .64 MG/G; MG/G
CREAM TOPICAL 2 TIMES DAILY
Qty: 15 G | Refills: 1 | Status: SHIPPED | OUTPATIENT
Start: 2025-07-01

## 2025-07-02 NOTE — PROGRESS NOTES
Assessment:   1. Bilateral hydronephrosis  -     Case Request Operating Room: CYSTOSCOPY, WITH RETROGRADE PYELOGRAM AND URETERAL STENT INSERTION, PYELOGRAM, ANTEGRADE    2. Ureteral obstruction, left  -     Case Request Operating Room: CYSTOSCOPY, WITH RETROGRADE PYELOGRAM AND URETERAL STENT INSERTION, PYELOGRAM, ANTEGRADE    3. Calcification of bladder  -     Case Request Operating Room: CYSTOSCOPY, WITH RETROGRADE PYELOGRAM AND URETERAL STENT INSERTION, PYELOGRAM, ANTEGRADE    4. Malignant neoplasm of urinary bladder, unspecified site    5. Candidal diaper rash  -     X-Ray KUB; Future; Expected date: 07/01/2025  -     clotrimazole-betamethasone 1-0.05% (LOTRISONE) cream; Apply topically 2 (two) times daily. Apply a small amount directly to the rash on the right flank twice per day  Dispense: 15 g; Refill: 1         Plan:     New dressings were applied to the nephrostomy sites on both sides and plans were made to exchange the current ureteral stents on the inside with long-term indwelling ureteral stents with ultimate plans to remove the nephrostomy catheters.  We discussed potentially using Lotrisone as needed but an occlusive dressing over the nephrostomy site may help prevent moisture and reaccumulation of the rash.  Plan to return next week for drip cultures from both right and left nephrostomy catheters.           Ron Tabor MD  Urology  07/02/2025          UROLOGY HISTORY AND PHYSICAL EXAM    Subjective:      Patient ID: Ruben Figueroa is a 79 y.o. male.    Chief Complaint::   HPI    The patient is an 79-year-old male with a history of ureteral obstruction and bladder cancer that presents today for follow-up.  He reports that both the right and left nephrostomy catheters are draining clear yellow urine reports that on occasion he notices an irritation at the site of the right nephrostomy.  He has been using tape and a combination of materials at home to reinforce the dressing.  He reports that he is  urinating and that there was no blood.  He is starting to feel better    Past Medical History:   Diagnosis Date    BPH (benign prostatic hyperplasia)     Chronic prostatitis     Hyperlipidemia     Hypertension     Insomnia     Malignant neoplasm of bladder     Sciatica      Past Surgical History:   Procedure Laterality Date    BLADDER TUMOR EXCISION      skin cancer removed      TURBT (TRANSURETHRAL RESECTION OF BLADDER TUMOR) N/A 10/4/2023    Procedure: TURBT (TRANSURETHRAL RESECTION OF BLADDER TUMOR);  Surgeon: Terrence Licea Jr., MD;  Location: [facility] OR;  Service: Urology;  Laterality: N/A;    TURBT (TRANSURETHRAL RESECTION OF BLADDER TUMOR) N/A 9/5/2024    Procedure: TURBT (TRANSURETHRAL RESECTION OF BLADDER TUMOR);  Surgeon: Terrence Licea Jr., MD;  Location: [facility] OR;  Service: Urology;  Laterality: N/A;        Medications Ordered Prior to Encounter[1]     Review of patient's allergies indicates:   Allergen Reactions    Doxycycline hyclate     Penicillins      Vitals:    07/01/25 1540   BP: (!) 173/77   Pulse: 64          Review of Systems   Constitutional:  Negative for fever.   Respiratory:  Negative for shortness of breath.    Cardiovascular:  Negative for leg swelling.   Genitourinary:  Positive for flank pain. Negative for hematuria.   Musculoskeletal:  Positive for arthralgias and back pain.   Skin:  Positive for rash.      Objective:     Physical Exam  Vitals and nursing note reviewed.   Constitutional:       Appearance: Normal appearance. He is normal weight.   HENT:      Head: Normocephalic and atraumatic.   Eyes:      General: No scleral icterus.     Conjunctiva/sclera: Conjunctivae normal.   Cardiovascular:      Rate and Rhythm: Normal rate.   Pulmonary:      Effort: No respiratory distress.      Breath sounds: No wheezing.   Abdominal:      General: There is no distension.      Palpations: Abdomen is soft. There is no mass.      Tenderness: There is no abdominal tenderness.       Comments: At the site of the right nephrostomy was a yeast rash with small satellite lesions.  The dressing was removed from the nephrostomy and the skin and was replaced with an occlusive dressing.  This was repeated on the left as well.  Both catheters were draining yellow urine.  New bags were attached   Musculoskeletal:         General: No deformity.   Skin:     General: Skin is warm and dry.      Findings: Rash present. No erythema.   Neurological:      General: No focal deficit present.      Mental Status: He is alert.   Psychiatric:         Mood and Affect: Mood normal.         Behavior: Behavior normal.         Thought Content: Thought content normal.         Judgment: Judgment normal.        Lab Results   Component Value Date    PSA 0.111 03/24/2025    PSA 0.275 12/12/2018        CMP  Sodium   Date Value Ref Range Status   06/30/2025 139 136 - 145 mmol/L Final     Potassium   Date Value Ref Range Status   06/30/2025 3.9 3.5 - 5.1 mmol/L Final     Chloride   Date Value Ref Range Status   06/30/2025 107 98 - 107 mmol/L Final     CO2   Date Value Ref Range Status   06/30/2025 25 23 - 31 mmol/L Final     Glucose   Date Value Ref Range Status   06/30/2025 110 82 - 115 mg/dL Final     BUN   Date Value Ref Range Status   06/30/2025 12 8 - 26 mg/dL Final     Creatinine   Date Value Ref Range Status   06/30/2025 1.33 (H) 0.72 - 1.25 mg/dL Final     Calcium   Date Value Ref Range Status   06/30/2025 9.2 8.8 - 10.0 mg/dL Final     Total Protein   Date Value Ref Range Status   04/23/2025 7.1 5.8 - 7.6 g/dL Final     Albumin   Date Value Ref Range Status   04/23/2025 3.8 3.4 - 4.8 g/dL Final     Bilirubin, Total   Date Value Ref Range Status   04/23/2025 0.6 <=1.5 mg/dL Final     Alk Phos   Date Value Ref Range Status   04/23/2025 75 40 - 150 U/L Final     AST   Date Value Ref Range Status   04/23/2025 16 11 - 45 U/L Final     ALT   Date Value Ref Range Status   04/23/2025 10 <=55 U/L Final     Anion Gap   Date  Value Ref Range Status   06/30/2025 11 7 - 16 mmol/L Final     eGFR   Date Value Ref Range Status   06/30/2025 54 (L) >=60 mL/min/1.73m2 Final     Comment:     Estimated GFR calculated using the CKD-EPI creatinine (2021) equation.      BMP  Lab Results   Component Value Date     06/30/2025    K 3.9 06/30/2025     06/30/2025    CO2 25 06/30/2025    BUN 12 06/30/2025    CREATININE 1.33 (H) 06/30/2025    CALCIUM 9.2 06/30/2025    ANIONGAP 11 06/30/2025    EGFRNORACEVR 54 (L) 06/30/2025                [1]  Current Outpatient Medications on File Prior to Visit   Medication Sig Dispense Refill    ALPRAZolam (XANAX) 0.5 MG tablet Take 1 tablet (0.5 mg total) by mouth 3 (three) times daily as needed for Anxiety. 90 tablet 2    amLODIPine (NORVASC) 5 MG tablet Take 5 mg by mouth once daily.      cloNIDine (CATAPRES) 0.1 MG tablet Take 0.1 mg by mouth every 4 (four) hours as needed (take for elevated BP. no more than 6 times a day).      empagliflozin (JARDIANCE) 25 mg tablet Take 1 tablet (25 mg total) by mouth once daily. (Patient not taking: Reported on 7/1/2025) 90 tablet 3    hydrALAZINE (APRESOLINE) 50 MG tablet Take 50 mg by mouth every 12 (twelve) hours.      hydroCHLOROthiazide (HYDRODIURIL) 12.5 MG Tab Take 1 tablet (12.5 mg total) by mouth once daily. 90 tablet 3    HYDROcodone-acetaminophen (NORCO) 7.5-325 mg per tablet Take 1 tablet by mouth 2 (two) times daily as needed.      NIFEdipine (PROCARDIA-XL) 60 MG (OSM) 24 hr tablet Take 60 mg by mouth once daily.      potassium chloride (MICRO-K) 10 MEQ CpSR Take 1 capsule (10 mEq total) by mouth once daily. 90 capsule 3    traZODone (DESYREL) 100 MG tablet TAKE 1 TABLET BY MOUTH EVERY EVENING. 90 tablet 3    valsartan (DIOVAN) 320 MG tablet Take 320 mg by mouth once daily.       Current Facility-Administered Medications on File Prior to Visit   Medication Dose Route Frequency Provider Last Rate Last Admin    LIDOcaine HCl 2% urojet   Urethral  1 time in Clinic/HOD Ron Tabor MD

## 2025-07-09 ENCOUNTER — CLINICAL SUPPORT (OUTPATIENT)
Dept: UROLOGY | Facility: CLINIC | Age: 80
End: 2025-07-09
Payer: MEDICARE

## 2025-07-09 VITALS
TEMPERATURE: 98 F | OXYGEN SATURATION: 98 % | WEIGHT: 160 LBS | SYSTOLIC BLOOD PRESSURE: 126 MMHG | HEIGHT: 71 IN | HEART RATE: 64 BPM | DIASTOLIC BLOOD PRESSURE: 64 MMHG | BODY MASS INDEX: 22.4 KG/M2

## 2025-07-09 DIAGNOSIS — N39.0 URINARY TRACT INFECTION WITHOUT HEMATURIA, SITE UNSPECIFIED: Primary | ICD-10-CM

## 2025-07-09 PROCEDURE — 99999 PR PBB SHADOW E&M-EST. PATIENT-LVL IV: CPT | Mod: PBBFAC,,,

## 2025-07-09 PROCEDURE — 99214 OFFICE O/P EST MOD 30 MIN: CPT | Mod: PBBFAC

## 2025-07-09 PROCEDURE — 87086 URINE CULTURE/COLONY COUNT: CPT | Mod: ,,, | Performed by: CLINICAL MEDICAL LABORATORY

## 2025-07-09 NOTE — PROGRESS NOTES
Voided urine collected for culture sent to lab.  Patient instructed someone from outpatient will call the day before surgery with instructions and time to arrive, he verbalized understanding.

## 2025-07-11 LAB — UA COMPLETE W REFLEX CULTURE PNL UR: NORMAL

## 2025-07-17 ENCOUNTER — TELEPHONE (OUTPATIENT)
Dept: UROLOGY | Facility: CLINIC | Age: 80
End: 2025-07-17
Payer: MEDICARE

## 2025-07-17 NOTE — TELEPHONE ENCOUNTER
Copied from CRM #[id number]. Topic: Appointments - Appointment Access  >> Jul 17, 2025  9:38 AM Carola wrote:  Who Called: Ruben D Gordon    Caller is requesting a sooner appointment. Caller declined first available appointment listed below. Caller will not accept being placed on the waitlist and is requesting a message be sent to doctor.    When is the first available appointment? July 22, 2025  Options offered (Virtual Visit, Urgent Care): Urology  Symptoms: Procedure CYSTOSCOPY, WITH RETROGRADE PYELOGRAM AND URETERAL STENT INSERTION [[id number]]  PYELOGRAM, ANTEGRADE [[id number]]         Preferred Method of Contact: Phone Call  Patient's Preferred Phone Number on File: [phone]     Additional Information: Pt. Stated his wife and himself has Covid and needs to reschedule his procedure.

## 2025-07-22 DIAGNOSIS — N13.30 BILATERAL HYDRONEPHROSIS: Primary | ICD-10-CM

## 2025-08-06 DIAGNOSIS — N13.30 BILATERAL HYDRONEPHROSIS: Primary | ICD-10-CM

## 2025-08-06 RX ORDER — LEVOFLOXACIN 5 MG/ML
500 INJECTION, SOLUTION INTRAVENOUS ONCE
Status: CANCELLED | OUTPATIENT
Start: 2025-08-07

## 2025-08-06 RX ORDER — SODIUM CHLORIDE 9 MG/ML
INJECTION, SOLUTION INTRAVENOUS CONTINUOUS
Status: CANCELLED | OUTPATIENT
Start: 2025-08-07

## 2025-08-07 ENCOUNTER — ANESTHESIA (OUTPATIENT)
Dept: SURGERY | Facility: HOSPITAL | Age: 80
End: 2025-08-07
Payer: MEDICARE

## 2025-08-07 ENCOUNTER — HOSPITAL ENCOUNTER (OUTPATIENT)
Facility: HOSPITAL | Age: 80
Discharge: HOME | End: 2025-08-08
Attending: UROLOGY | Admitting: UROLOGY
Payer: MEDICARE

## 2025-08-07 ENCOUNTER — ANESTHESIA EVENT (OUTPATIENT)
Dept: SURGERY | Facility: HOSPITAL | Age: 80
End: 2025-08-07
Payer: MEDICARE

## 2025-08-07 DIAGNOSIS — R00.1 BRADYCARDIA: ICD-10-CM

## 2025-08-07 DIAGNOSIS — Z01.810 PREOPERATIVE CARDIOVASCULAR EXAMINATION: ICD-10-CM

## 2025-08-07 DIAGNOSIS — R07.9 CHEST PAIN: ICD-10-CM

## 2025-08-07 DIAGNOSIS — N13.30 BILATERAL HYDRONEPHROSIS: ICD-10-CM

## 2025-08-07 DIAGNOSIS — R00.1 BRADYCARDIA, UNSPECIFIED: ICD-10-CM

## 2025-08-07 DIAGNOSIS — C67.9 MALIGNANT NEOPLASM OF URINARY BLADDER, UNSPECIFIED SITE: Primary | ICD-10-CM

## 2025-08-07 PROBLEM — R53.0 NEOPLASTIC (MALIGNANT) RELATED FATIGUE: Status: ACTIVE | Noted: 2025-08-07

## 2025-08-07 LAB
ANION GAP SERPL CALCULATED.3IONS-SCNC: 11 MMOL/L (ref 7–16)
BASOPHILS # BLD AUTO: 0.04 K/UL (ref 0–0.2)
BASOPHILS NFR BLD AUTO: 0.6 % (ref 0–1)
BUN SERPL-MCNC: 11 MG/DL (ref 8–26)
BUN/CREAT SERPL: 11 (ref 6–20)
CALCIUM SERPL-MCNC: 8.5 MG/DL (ref 8.8–10)
CHLORIDE SERPL-SCNC: 108 MMOL/L (ref 98–107)
CO2 SERPL-SCNC: 23 MMOL/L (ref 23–31)
CREAT SERPL-MCNC: 1.02 MG/DL (ref 0.72–1.25)
DIFFERENTIAL METHOD BLD: ABNORMAL
EGFR (NO RACE VARIABLE) (RUSH/TITUS): 74 ML/MIN/1.73M2
EOSINOPHIL # BLD AUTO: 0.24 K/UL (ref 0–0.5)
EOSINOPHIL NFR BLD AUTO: 3.3 % (ref 1–4)
ERYTHROCYTE [DISTWIDTH] IN BLOOD BY AUTOMATED COUNT: 13.3 % (ref 11.5–14.5)
GLUCOSE SERPL-MCNC: 99 MG/DL (ref 82–115)
HCT VFR BLD AUTO: 38.4 % (ref 40–54)
HGB BLD-MCNC: 12.7 G/DL (ref 13.5–18)
IMM GRANULOCYTES # BLD AUTO: 0.02 K/UL (ref 0–0.04)
IMM GRANULOCYTES NFR BLD: 0.3 % (ref 0–0.4)
LYMPHOCYTES # BLD AUTO: 1.74 K/UL (ref 1–4.8)
LYMPHOCYTES NFR BLD AUTO: 23.9 % (ref 27–41)
MCH RBC QN AUTO: 30.5 PG (ref 27–31)
MCHC RBC AUTO-ENTMCNC: 33.1 G/DL (ref 32–36)
MCV RBC AUTO: 92.3 FL (ref 80–96)
MONOCYTES # BLD AUTO: 0.57 K/UL (ref 0–0.8)
MONOCYTES NFR BLD AUTO: 7.8 % (ref 2–6)
MPC BLD CALC-MCNC: 9.7 FL (ref 9.4–12.4)
NEUTROPHILS # BLD AUTO: 4.66 K/UL (ref 1.8–7.7)
NEUTROPHILS NFR BLD AUTO: 64.1 % (ref 53–65)
NRBC # BLD AUTO: 0 X10E3/UL
NRBC, AUTO (.00): 0 %
PLATELET # BLD AUTO: 258 K/UL (ref 150–400)
POTASSIUM SERPL-SCNC: 4 MMOL/L (ref 3.5–5.1)
RBC # BLD AUTO: 4.16 M/UL (ref 4.6–6.2)
SODIUM SERPL-SCNC: 138 MMOL/L (ref 136–145)
WBC # BLD AUTO: 7.27 K/UL (ref 4.5–11)

## 2025-08-07 PROCEDURE — 93005 ELECTROCARDIOGRAM TRACING: CPT

## 2025-08-07 PROCEDURE — 99214 OFFICE O/P EST MOD 30 MIN: CPT | Mod: ,,, | Performed by: STUDENT IN AN ORGANIZED HEALTH CARE EDUCATION/TRAINING PROGRAM

## 2025-08-07 PROCEDURE — G0378 HOSPITAL OBSERVATION PER HR: HCPCS

## 2025-08-07 PROCEDURE — 85025 COMPLETE CBC W/AUTO DIFF WBC: CPT | Performed by: STUDENT IN AN ORGANIZED HEALTH CARE EDUCATION/TRAINING PROGRAM

## 2025-08-07 PROCEDURE — C1758 CATHETER, URETERAL: HCPCS | Performed by: UROLOGY

## 2025-08-07 PROCEDURE — 36000706: Performed by: UROLOGY

## 2025-08-07 PROCEDURE — 27000716 HC OXISENSOR PROBE, ANY SIZE: Performed by: ANESTHESIOLOGY

## 2025-08-07 PROCEDURE — 63600175 PHARM REV CODE 636 W HCPCS: Performed by: STUDENT IN AN ORGANIZED HEALTH CARE EDUCATION/TRAINING PROGRAM

## 2025-08-07 PROCEDURE — 50389 REMOVE RENAL TUBE W/FLUORO: CPT | Mod: 50,51,, | Performed by: UROLOGY

## 2025-08-07 PROCEDURE — 99900035 HC TECH TIME PER 15 MIN (STAT)

## 2025-08-07 PROCEDURE — 94761 N-INVAS EAR/PLS OXIMETRY MLT: CPT

## 2025-08-07 PROCEDURE — 27000165 HC TUBE, ETT CUFFED: Performed by: ANESTHESIOLOGY

## 2025-08-07 PROCEDURE — 37000008 HC ANESTHESIA 1ST 15 MINUTES: Performed by: UROLOGY

## 2025-08-07 PROCEDURE — 25000003 PHARM REV CODE 250: Performed by: UROLOGY

## 2025-08-07 PROCEDURE — 37000009 HC ANESTHESIA EA ADD 15 MINS: Performed by: UROLOGY

## 2025-08-07 PROCEDURE — 93010 ELECTROCARDIOGRAM REPORT: CPT | Mod: ,,, | Performed by: HOSPITALIST

## 2025-08-07 PROCEDURE — C2625 STENT, NON-COR, TEM W/DEL SY: HCPCS | Performed by: UROLOGY

## 2025-08-07 PROCEDURE — 71000033 HC RECOVERY, INTIAL HOUR: Performed by: UROLOGY

## 2025-08-07 PROCEDURE — 25000003 PHARM REV CODE 250: Performed by: STUDENT IN AN ORGANIZED HEALTH CARE EDUCATION/TRAINING PROGRAM

## 2025-08-07 PROCEDURE — 94799 UNLISTED PULMONARY SVC/PX: CPT

## 2025-08-07 PROCEDURE — 63600175 PHARM REV CODE 636 W HCPCS: Performed by: ANESTHESIOLOGY

## 2025-08-07 PROCEDURE — C1769 GUIDE WIRE: HCPCS | Performed by: UROLOGY

## 2025-08-07 PROCEDURE — 27000655: Performed by: ANESTHESIOLOGY

## 2025-08-07 PROCEDURE — 36415 COLL VENOUS BLD VENIPUNCTURE: CPT | Performed by: STUDENT IN AN ORGANIZED HEALTH CARE EDUCATION/TRAINING PROGRAM

## 2025-08-07 PROCEDURE — 63600175 PHARM REV CODE 636 W HCPCS: Performed by: NURSE ANESTHETIST, CERTIFIED REGISTERED

## 2025-08-07 PROCEDURE — 25000003 PHARM REV CODE 250: Performed by: NURSE ANESTHETIST, CERTIFIED REGISTERED

## 2025-08-07 PROCEDURE — 96374 THER/PROPH/DIAG INJ IV PUSH: CPT

## 2025-08-07 PROCEDURE — 52332 CYSTOSCOPY AND TREATMENT: CPT | Mod: 50,,, | Performed by: UROLOGY

## 2025-08-07 PROCEDURE — 96372 THER/PROPH/DIAG INJ SC/IM: CPT | Performed by: STUDENT IN AN ORGANIZED HEALTH CARE EDUCATION/TRAINING PROGRAM

## 2025-08-07 PROCEDURE — 27000689 HC BLADE LARYNGOSCOPE ANY SIZE: Performed by: ANESTHESIOLOGY

## 2025-08-07 PROCEDURE — 36000707: Performed by: UROLOGY

## 2025-08-07 PROCEDURE — 25000003 PHARM REV CODE 250: Performed by: HOSPITALIST

## 2025-08-07 PROCEDURE — 74420 UROGRAPHY RTRGR +-KUB: CPT | Mod: 26,,, | Performed by: UROLOGY

## 2025-08-07 PROCEDURE — 80048 BASIC METABOLIC PNL TOTAL CA: CPT | Performed by: STUDENT IN AN ORGANIZED HEALTH CARE EDUCATION/TRAINING PROGRAM

## 2025-08-07 DEVICE — IMPLANTABLE DEVICE: Type: IMPLANTABLE DEVICE | Site: KIDNEY | Status: FUNCTIONAL

## 2025-08-07 RX ORDER — POTASSIUM CHLORIDE 1500 MG/1
20 TABLET, EXTENDED RELEASE ORAL 2 TIMES DAILY
COMMUNITY
Start: 2025-08-01

## 2025-08-07 RX ORDER — ROCURONIUM BROMIDE 10 MG/ML
INJECTION, SOLUTION INTRAVENOUS
Status: DISCONTINUED | OUTPATIENT
Start: 2025-08-07 | End: 2025-08-07

## 2025-08-07 RX ORDER — HYDROMORPHONE HYDROCHLORIDE 2 MG/ML
INJECTION, SOLUTION INTRAMUSCULAR; INTRAVENOUS; SUBCUTANEOUS
Status: DISCONTINUED | OUTPATIENT
Start: 2025-08-07 | End: 2025-08-07

## 2025-08-07 RX ORDER — ONDANSETRON HYDROCHLORIDE 2 MG/ML
INJECTION, SOLUTION INTRAVENOUS
Status: DISCONTINUED | OUTPATIENT
Start: 2025-08-07 | End: 2025-08-07

## 2025-08-07 RX ORDER — IBUPROFEN 200 MG
24 TABLET ORAL
Status: DISCONTINUED | OUTPATIENT
Start: 2025-08-07 | End: 2025-08-08 | Stop reason: HOSPADM

## 2025-08-07 RX ORDER — LIDOCAINE HYDROCHLORIDE 20 MG/ML
INJECTION, SOLUTION EPIDURAL; INFILTRATION; INTRACAUDAL; PERINEURAL
Status: DISCONTINUED | OUTPATIENT
Start: 2025-08-07 | End: 2025-08-07

## 2025-08-07 RX ORDER — POLYETHYLENE GLYCOL 3350 17 G/17G
17 POWDER, FOR SOLUTION ORAL 2 TIMES DAILY PRN
Status: DISCONTINUED | OUTPATIENT
Start: 2025-08-07 | End: 2025-08-08 | Stop reason: HOSPADM

## 2025-08-07 RX ORDER — TIZANIDINE 4 MG/1
4 TABLET ORAL ONCE
Status: COMPLETED | OUTPATIENT
Start: 2025-08-08 | End: 2025-08-07

## 2025-08-07 RX ORDER — TRAZODONE HYDROCHLORIDE 50 MG/1
100 TABLET ORAL NIGHTLY PRN
Status: DISCONTINUED | OUTPATIENT
Start: 2025-08-07 | End: 2025-08-08 | Stop reason: HOSPADM

## 2025-08-07 RX ORDER — ONDANSETRON HYDROCHLORIDE 2 MG/ML
4 INJECTION, SOLUTION INTRAVENOUS DAILY PRN
Status: DISCONTINUED | OUTPATIENT
Start: 2025-08-07 | End: 2025-08-07 | Stop reason: HOSPADM

## 2025-08-07 RX ORDER — HYDROCODONE BITARTRATE AND ACETAMINOPHEN 7.5; 325 MG/1; MG/1
1 TABLET ORAL 2 TIMES DAILY PRN
Refills: 0 | Status: DISCONTINUED | OUTPATIENT
Start: 2025-08-07 | End: 2025-08-08 | Stop reason: HOSPADM

## 2025-08-07 RX ORDER — SODIUM CHLORIDE 0.9 % (FLUSH) 0.9 %
10 SYRINGE (ML) INJECTION EVERY 12 HOURS PRN
Status: DISCONTINUED | OUTPATIENT
Start: 2025-08-07 | End: 2025-08-08 | Stop reason: HOSPADM

## 2025-08-07 RX ORDER — LEVOFLOXACIN 5 MG/ML
500 INJECTION, SOLUTION INTRAVENOUS ONCE
Status: DISCONTINUED | OUTPATIENT
Start: 2025-08-07 | End: 2025-08-08 | Stop reason: HOSPADM

## 2025-08-07 RX ORDER — EPHEDRINE SULFATE 50 MG/ML
INJECTION, SOLUTION INTRAVENOUS
Status: DISCONTINUED | OUTPATIENT
Start: 2025-08-07 | End: 2025-08-07

## 2025-08-07 RX ORDER — VALSARTAN 80 MG/1
320 TABLET ORAL DAILY
Status: DISCONTINUED | OUTPATIENT
Start: 2025-08-07 | End: 2025-08-08 | Stop reason: HOSPADM

## 2025-08-07 RX ORDER — PROPOFOL 10 MG/ML
VIAL (ML) INTRAVENOUS
Status: DISCONTINUED | OUTPATIENT
Start: 2025-08-07 | End: 2025-08-07

## 2025-08-07 RX ORDER — LEVOFLOXACIN 250 MG/1
500 TABLET, FILM COATED ORAL DAILY
Status: DISCONTINUED | OUTPATIENT
Start: 2025-08-08 | End: 2025-08-08 | Stop reason: HOSPADM

## 2025-08-07 RX ORDER — TRAZODONE HYDROCHLORIDE 50 MG/1
100 TABLET ORAL NIGHTLY
Status: DISCONTINUED | OUTPATIENT
Start: 2025-08-07 | End: 2025-08-07

## 2025-08-07 RX ORDER — FENTANYL CITRATE 50 UG/ML
INJECTION, SOLUTION INTRAMUSCULAR; INTRAVENOUS
Status: DISCONTINUED | OUTPATIENT
Start: 2025-08-07 | End: 2025-08-07

## 2025-08-07 RX ORDER — MORPHINE SULFATE 10 MG/ML
4 INJECTION INTRAMUSCULAR; INTRAVENOUS; SUBCUTANEOUS EVERY 5 MIN PRN
Status: COMPLETED | OUTPATIENT
Start: 2025-08-07 | End: 2025-08-07

## 2025-08-07 RX ORDER — MORPHINE SULFATE 4 MG/ML
4 INJECTION, SOLUTION INTRAMUSCULAR; INTRAVENOUS EVERY 4 HOURS PRN
Refills: 0 | Status: DISCONTINUED | OUTPATIENT
Start: 2025-08-07 | End: 2025-08-08 | Stop reason: HOSPADM

## 2025-08-07 RX ORDER — IBUPROFEN 200 MG
16 TABLET ORAL
Status: DISCONTINUED | OUTPATIENT
Start: 2025-08-07 | End: 2025-08-08 | Stop reason: HOSPADM

## 2025-08-07 RX ORDER — GLUCAGON 1 MG
1 KIT INJECTION
Status: DISCONTINUED | OUTPATIENT
Start: 2025-08-07 | End: 2025-08-08 | Stop reason: HOSPADM

## 2025-08-07 RX ORDER — NALOXONE HCL 0.4 MG/ML
0.02 VIAL (ML) INJECTION
Status: DISCONTINUED | OUTPATIENT
Start: 2025-08-07 | End: 2025-08-08 | Stop reason: HOSPADM

## 2025-08-07 RX ORDER — GLYCOPYRROLATE 0.2 MG/ML
INJECTION INTRAMUSCULAR; INTRAVENOUS
Status: DISCONTINUED | OUTPATIENT
Start: 2025-08-07 | End: 2025-08-07

## 2025-08-07 RX ORDER — DIPHENHYDRAMINE HYDROCHLORIDE 50 MG/ML
25 INJECTION, SOLUTION INTRAMUSCULAR; INTRAVENOUS EVERY 6 HOURS PRN
Status: DISCONTINUED | OUTPATIENT
Start: 2025-08-07 | End: 2025-08-07 | Stop reason: HOSPADM

## 2025-08-07 RX ORDER — ONDANSETRON HYDROCHLORIDE 2 MG/ML
4 INJECTION, SOLUTION INTRAVENOUS EVERY 8 HOURS PRN
Status: DISCONTINUED | OUTPATIENT
Start: 2025-08-07 | End: 2025-08-08 | Stop reason: HOSPADM

## 2025-08-07 RX ORDER — HYDROMORPHONE HYDROCHLORIDE 2 MG/ML
0.5 INJECTION, SOLUTION INTRAMUSCULAR; INTRAVENOUS; SUBCUTANEOUS EVERY 5 MIN PRN
Status: DISCONTINUED | OUTPATIENT
Start: 2025-08-07 | End: 2025-08-07 | Stop reason: HOSPADM

## 2025-08-07 RX ORDER — GLUCAGON 1 MG
1 KIT INJECTION
Status: DISCONTINUED | OUTPATIENT
Start: 2025-08-07 | End: 2025-08-07 | Stop reason: HOSPADM

## 2025-08-07 RX ORDER — SODIUM CHLORIDE 9 MG/ML
INJECTION, SOLUTION INTRAVENOUS CONTINUOUS
Status: DISCONTINUED | OUTPATIENT
Start: 2025-08-07 | End: 2025-08-08

## 2025-08-07 RX ORDER — HEPARIN SODIUM 5000 [USP'U]/ML
5000 INJECTION, SOLUTION INTRAVENOUS; SUBCUTANEOUS EVERY 8 HOURS
Status: DISCONTINUED | OUTPATIENT
Start: 2025-08-07 | End: 2025-08-08 | Stop reason: HOSPADM

## 2025-08-07 RX ORDER — ACETAMINOPHEN 10 MG/ML
1000 INJECTION, SOLUTION INTRAVENOUS ONCE
Status: COMPLETED | OUTPATIENT
Start: 2025-08-07 | End: 2025-08-07

## 2025-08-07 RX ORDER — IPRATROPIUM BROMIDE AND ALBUTEROL SULFATE 2.5; .5 MG/3ML; MG/3ML
3 SOLUTION RESPIRATORY (INHALATION) ONCE AS NEEDED
Status: DISCONTINUED | OUTPATIENT
Start: 2025-08-07 | End: 2025-08-07 | Stop reason: HOSPADM

## 2025-08-07 RX ADMIN — ROCURONIUM BROMIDE 50 MG: 10 INJECTION, SOLUTION INTRAVENOUS at 08:08

## 2025-08-07 RX ADMIN — ACETAMINOPHEN 1000 MG: 10 INJECTION, SOLUTION INTRAVENOUS at 10:08

## 2025-08-07 RX ADMIN — MORPHINE SULFATE 4 MG: 4 INJECTION INTRAVENOUS at 10:08

## 2025-08-07 RX ADMIN — GLYCOPYRROLATE 0.2 MG: 0.2 INJECTION INTRAMUSCULAR; INTRAVENOUS at 08:08

## 2025-08-07 RX ADMIN — EPHEDRINE SULFATE 25 MG: 50 INJECTION INTRAVENOUS at 09:08

## 2025-08-07 RX ADMIN — HEPARIN SODIUM 5000 UNITS: 5000 INJECTION, SOLUTION INTRAVENOUS; SUBCUTANEOUS at 09:08

## 2025-08-07 RX ADMIN — SODIUM CHLORIDE: 9 INJECTION, SOLUTION INTRAVENOUS at 06:08

## 2025-08-07 RX ADMIN — FENTANYL CITRATE 100 MCG: 50 INJECTION, SOLUTION INTRAMUSCULAR; INTRAVENOUS at 09:08

## 2025-08-07 RX ADMIN — SUGAMMADEX 200 MG: 100 INJECTION, SOLUTION INTRAVENOUS at 09:08

## 2025-08-07 RX ADMIN — HYDROMORPHONE HYDROCHLORIDE 2 MG: 2 INJECTION, SOLUTION INTRAMUSCULAR; INTRAVENOUS; SUBCUTANEOUS at 09:08

## 2025-08-07 RX ADMIN — VALSARTAN 320 MG: 80 TABLET, FILM COATED ORAL at 02:08

## 2025-08-07 RX ADMIN — HYDROMORPHONE HYDROCHLORIDE 0.5 MG: 2 INJECTION INTRAMUSCULAR; INTRAVENOUS; SUBCUTANEOUS at 10:08

## 2025-08-07 RX ADMIN — MORPHINE SULFATE 3 MG: 10 INJECTION INTRAVENOUS at 10:08

## 2025-08-07 RX ADMIN — TRAZODONE HYDROCHLORIDE 100 MG: 50 TABLET ORAL at 08:08

## 2025-08-07 RX ADMIN — LIDOCAINE HYDROCHLORIDE 50 MG: 20 INJECTION, SOLUTION EPIDURAL; INFILTRATION; INTRACAUDAL; PERINEURAL at 08:08

## 2025-08-07 RX ADMIN — SODIUM CHLORIDE: 9 INJECTION, SOLUTION INTRAVENOUS at 08:08

## 2025-08-07 RX ADMIN — HYDROCODONE BITARTRATE AND ACETAMINOPHEN 1 TABLET: 7.5; 325 TABLET ORAL at 08:08

## 2025-08-07 RX ADMIN — HYDROCODONE BITARTRATE AND ACETAMINOPHEN 1 TABLET: 7.5; 325 TABLET ORAL at 04:08

## 2025-08-07 RX ADMIN — ONDANSETRON 4 MG: 2 INJECTION INTRAMUSCULAR; INTRAVENOUS at 10:08

## 2025-08-07 RX ADMIN — HEPARIN SODIUM 5000 UNITS: 5000 INJECTION, SOLUTION INTRAVENOUS; SUBCUTANEOUS at 02:08

## 2025-08-07 RX ADMIN — PROPOFOL 100 MG: 10 INJECTION, EMULSION INTRAVENOUS at 08:08

## 2025-08-07 RX ADMIN — MORPHINE SULFATE 4 MG: 10 INJECTION INTRAVENOUS at 10:08

## 2025-08-07 RX ADMIN — TIZANIDINE 4 MG: 4 TABLET ORAL at 11:08

## 2025-08-07 RX ADMIN — ONDANSETRON 4 MG: 2 INJECTION INTRAMUSCULAR; INTRAVENOUS at 08:08

## 2025-08-07 NOTE — ANESTHESIA PREPROCEDURE EVALUATION
08/07/2025  Ruben Figueroa is a 79 y.o., male.      Pre-op Assessment    I have reviewed the Patient Summary Reports.     I have reviewed the Nursing Notes. I have reviewed the NPO Status.   I have reviewed the Medications.     Review of Systems  Anesthesia Hx:  No problems with previous Anesthesia             Denies Family Hx of Anesthesia complications.    Denies Personal Hx of Anesthesia complications.                    Social:  Non-Smoker, No Alcohol Use       Hematology/Oncology:                      Current/Recent Cancer.            Oncology Comments: Bladder Cancer     Cardiovascular:  Exercise tolerance: good   Hypertension    Dysrhythmias atrial fibrillation         ECG has been reviewed.                            Renal/:  Chronic Renal Disease   H/o bladder ca, hydronephrosis, ureteral obstruction s/p nephrostomy bilateral tube placement    Presents today for  CYSTOSCOPY, WITH RETROGRADE PYELOGRAM AND URETERAL STENT INSERTION, NEPHROSTOGRAM ANTEGRADE (Bilateral)             Neurological:    Neuromuscular Disease,                                       Physical Exam  General: Well nourished, Cooperative and Alert    Airway:  Mallampati: II   Mouth Opening: Normal  TM Distance: Normal  Tongue: Normal  Neck ROM: Normal ROM    Dental:  Intact    Chest/Lungs:  Clear to auscultation, Normal Respiratory Rate    Heart:  Rate: Normal  Rhythm: Regular Rhythm        Chemistry        Component Value Date/Time     06/30/2025 0920    K 3.9 06/30/2025 0920     06/30/2025 0920    CO2 25 06/30/2025 0920    BUN 12 06/30/2025 0920    CREATININE 1.33 (H) 06/30/2025 0920     06/30/2025 0920        Component Value Date/Time    CALCIUM 9.2 06/30/2025 0920    ALKPHOS 75 04/23/2025 1111    AST 16 04/23/2025 1111    ALT 10 04/23/2025 1111    BILITOT 0.6 04/23/2025 1111    ESTGFRAFRICA 86 08/19/2020 0710     EGFRNONAA 73 03/07/2022 1242        Lab Results   Component Value Date    WBC 10.65 06/30/2025    HGB 14.9 06/30/2025    HCT 45.5 06/30/2025     06/30/2025     Results for orders placed or performed in visit on 04/08/25   EKG 12-lead    Collection Time: 04/08/25 11:22 AM   Result Value Ref Range    QRS Duration 86 ms    OHS QTC Calculation 450 ms    Narrative    Test Reason : Z01.810,    Vent. Rate :  53 BPM     Atrial Rate :    BPM     P-R Int :    ms          QRS Dur :  86 ms      QT Int : 480 ms       P-R-T Axes :     84  -4 degrees    QTcB Int : 450 ms    Atrial fibrillation with slow ventricular response  Abnormal QRS-T angle, consider primary T wave abnormality  Abnormal ECG  When compared with ECG of 04-Oct-2023 07:25,  Atrial fibrillation has replaced Sinus rhythm  T wave inversion no longer evident in Lateral leads  Confirmed by Jama Pastrana ([id number]) on 4/11/2025 8:18:53 AM    Referred By: SAUMYA FRAIRE           Confirmed By: Jama Pastrana             Anesthesia Plan  Type of Anesthesia, risks & benefits discussed:    Anesthesia Type: Gen ETT  Intra-op Monitoring Plan: Standard ASA Monitors  Post Op Pain Control Plan: multimodal analgesia  Induction:  IV  Airway Plan: Direct, Post-Induction  Informed Consent: Informed consent signed with the Patient and all parties understand the risks and agree with anesthesia plan.  All questions answered.   ASA Score: 3  Day of Surgery Review of History & Physical: H&P Update referred to the surgeon/provider.I have interviewed and examined the patient. I have reviewed the patient's H&P dated: There are no significant changes.     Ready For Surgery From Anesthesia Perspective.     .

## 2025-08-07 NOTE — ANESTHESIA PROCEDURE NOTES
Intubation    Date/Time: 8/7/2025 8:43 AM    Performed by: Cipriano Pa CRNA  Authorized by: Roque Puckett DO    Intubation:     Induction:  Intravenous    Intubated:  Postinduction    Mask Ventilation:  Easy mask    Attempts:  1    Attempted By:  CRNA    Method of Intubation:  Direct    Blade:  [place] 2    Laryngeal View Grade: Grade IIA - cords partially seen      Difficult Airway Encountered?: No      Complications:  None    Airway Device:  Oral endotracheal tube    Airway Device Size:  7.5    Style/Cuff Inflation:  Cuffed    Inflation Amount (mL):  10    Tube secured:  22    Secured at:  The lips    Placement Verified By:  Capnometry    Complicating Factors:  None    Findings Post-Intubation:  BS equal bilateral

## 2025-08-07 NOTE — TRANSFER OF CARE
"Anesthesia Transfer of Care Note    Patient: Ruben Figueroa    Procedure(s) Performed: Procedure(s) (LRB):  CYSTOSCOPY, WITH RETROGRADE PYELOGRAM AND URETERAL STENT INSERTION, NEPHROSTOGRAM ANTEGRADE (Bilateral)    Patient location: PACU    Anesthesia Type: general    Transport from OR: Transported from OR on 100% O2 by closed face mask with adequate spontaneous ventilation    Post pain: adequate analgesia    Post assessment: no apparent anesthetic complications    Post vital signs: stable    Level of consciousness: awake, alert and oriented    Nausea/Vomiting: no nausea/vomiting    Complications: none    Transfer of care protocol was followed      Last vitals: Visit Vitals  BP (!) 147/89 (BP Location: Right arm, Patient Position: Lying)   Pulse 90   Temp 36.6 °C (97.8 °F) (Oral)   Resp 14   Ht 5' 10" (1.778 m)   Wt 70.8 kg (156 lb)   SpO2 98%   BMI 22.38 kg/m²     "

## 2025-08-07 NOTE — ANESTHESIA POSTPROCEDURE EVALUATION
Anesthesia Post Evaluation    Patient: Ruben Figueroa    Procedure(s) Performed: Procedure(s) (LRB):  CYSTOSCOPY, WITH RETROGRADE PYELOGRAM AND URETERAL STENT INSERTION (Bilateral)  NEPHROSTOGRAM, ANTEGRADE (Bilateral)    Final Anesthesia Type: general      Patient location during evaluation: PACU  Patient participation: Yes- Able to Participate  Level of consciousness: awake and alert and oriented  Post-procedure vital signs: reviewed and stable  Pain management: adequate  Airway patency: patent  CECILIO mitigation strategies: Multimodal analgesia  PONV status at discharge: No PONV  Anesthetic complications: no      Cardiovascular status: hemodynamically stable  Respiratory status: unassisted and spontaneous ventilation  Hydration status: euvolemic  Follow-up not needed.              Vitals Value Taken Time   /82 08/07/25 11:14   Temp 36.4 °C (97.5 °F) 08/07/25 11:17   Pulse 64 08/07/25 11:17   Resp 17 08/07/25 11:01   SpO2 98 % 08/07/25 11:33   Vitals shown include unfiled device data.      Event Time   Out of Recovery 11:00:00         Pain/Matias Score: Pain Rating Prior to Med Admin: 6 (8/7/2025 10:45 AM)  Pain Rating Post Med Admin: 5 (8/7/2025 11:00 AM)  Matias Score: 10 (8/7/2025 11:00 AM)

## 2025-08-08 VITALS
WEIGHT: 156 LBS | SYSTOLIC BLOOD PRESSURE: 171 MMHG | OXYGEN SATURATION: 96 % | TEMPERATURE: 98 F | RESPIRATION RATE: 18 BRPM | BODY MASS INDEX: 22.33 KG/M2 | HEIGHT: 70 IN | DIASTOLIC BLOOD PRESSURE: 63 MMHG | HEART RATE: 63 BPM

## 2025-08-08 LAB
ANION GAP SERPL CALCULATED.3IONS-SCNC: 11 MMOL/L (ref 7–16)
BASOPHILS # BLD AUTO: 0.06 K/UL (ref 0–0.2)
BASOPHILS NFR BLD AUTO: 0.5 % (ref 0–1)
BUN SERPL-MCNC: 10 MG/DL (ref 8–26)
BUN/CREAT SERPL: 11 (ref 6–20)
CALCIUM SERPL-MCNC: 7.9 MG/DL (ref 8.8–10)
CHLORIDE SERPL-SCNC: 109 MMOL/L (ref 98–107)
CO2 SERPL-SCNC: 21 MMOL/L (ref 23–31)
CREAT SERPL-MCNC: 0.9 MG/DL (ref 0.72–1.25)
DIFFERENTIAL METHOD BLD: ABNORMAL
EGFR (NO RACE VARIABLE) (RUSH/TITUS): 86 ML/MIN/1.73M2
EOSINOPHIL # BLD AUTO: 0.27 K/UL (ref 0–0.5)
EOSINOPHIL NFR BLD AUTO: 2.5 % (ref 1–4)
ERYTHROCYTE [DISTWIDTH] IN BLOOD BY AUTOMATED COUNT: 13.4 % (ref 11.5–14.5)
GLUCOSE SERPL-MCNC: 105 MG/DL (ref 82–115)
HCT VFR BLD AUTO: 33.9 % (ref 40–54)
HGB BLD-MCNC: 11.2 G/DL (ref 13.5–18)
IMM GRANULOCYTES # BLD AUTO: 0.05 K/UL (ref 0–0.04)
IMM GRANULOCYTES NFR BLD: 0.5 % (ref 0–0.4)
LYMPHOCYTES # BLD AUTO: 1.73 K/UL (ref 1–4.8)
LYMPHOCYTES NFR BLD AUTO: 15.9 % (ref 27–41)
MCH RBC QN AUTO: 31.2 PG (ref 27–31)
MCHC RBC AUTO-ENTMCNC: 33 G/DL (ref 32–36)
MCV RBC AUTO: 94.4 FL (ref 80–96)
MONOCYTES # BLD AUTO: 0.95 K/UL (ref 0–0.8)
MONOCYTES NFR BLD AUTO: 8.7 % (ref 2–6)
MPC BLD CALC-MCNC: 10.7 FL (ref 9.4–12.4)
NEUTROPHILS # BLD AUTO: 7.85 K/UL (ref 1.8–7.7)
NEUTROPHILS NFR BLD AUTO: 71.9 % (ref 53–65)
NRBC # BLD AUTO: 0 X10E3/UL
NRBC, AUTO (.00): 0 %
OHS QRS DURATION: 82 MS
OHS QTC CALCULATION: 307 MS
PLATELET # BLD AUTO: 224 K/UL (ref 150–400)
POTASSIUM SERPL-SCNC: 3.7 MMOL/L (ref 3.5–5.1)
RBC # BLD AUTO: 3.59 M/UL (ref 4.6–6.2)
SODIUM SERPL-SCNC: 137 MMOL/L (ref 136–145)
WBC # BLD AUTO: 10.91 K/UL (ref 4.5–11)

## 2025-08-08 PROCEDURE — 99214 OFFICE O/P EST MOD 30 MIN: CPT | Mod: ,,, | Performed by: STUDENT IN AN ORGANIZED HEALTH CARE EDUCATION/TRAINING PROGRAM

## 2025-08-08 PROCEDURE — 85025 COMPLETE CBC W/AUTO DIFF WBC: CPT | Performed by: STUDENT IN AN ORGANIZED HEALTH CARE EDUCATION/TRAINING PROGRAM

## 2025-08-08 PROCEDURE — 93005 ELECTROCARDIOGRAM TRACING: CPT

## 2025-08-08 PROCEDURE — 25000003 PHARM REV CODE 250: Performed by: STUDENT IN AN ORGANIZED HEALTH CARE EDUCATION/TRAINING PROGRAM

## 2025-08-08 PROCEDURE — G0378 HOSPITAL OBSERVATION PER HR: HCPCS

## 2025-08-08 PROCEDURE — 99900035 HC TECH TIME PER 15 MIN (STAT)

## 2025-08-08 PROCEDURE — 96372 THER/PROPH/DIAG INJ SC/IM: CPT | Performed by: HOSPITALIST

## 2025-08-08 PROCEDURE — 80048 BASIC METABOLIC PNL TOTAL CA: CPT | Performed by: STUDENT IN AN ORGANIZED HEALTH CARE EDUCATION/TRAINING PROGRAM

## 2025-08-08 PROCEDURE — 63600175 PHARM REV CODE 636 W HCPCS: Performed by: HOSPITALIST

## 2025-08-08 PROCEDURE — 36415 COLL VENOUS BLD VENIPUNCTURE: CPT | Performed by: STUDENT IN AN ORGANIZED HEALTH CARE EDUCATION/TRAINING PROGRAM

## 2025-08-08 PROCEDURE — 94761 N-INVAS EAR/PLS OXIMETRY MLT: CPT

## 2025-08-08 PROCEDURE — 96372 THER/PROPH/DIAG INJ SC/IM: CPT

## 2025-08-08 PROCEDURE — 93010 ELECTROCARDIOGRAM REPORT: CPT | Mod: ,,, | Performed by: INTERNAL MEDICINE

## 2025-08-08 PROCEDURE — 96372 THER/PROPH/DIAG INJ SC/IM: CPT | Performed by: STUDENT IN AN ORGANIZED HEALTH CARE EDUCATION/TRAINING PROGRAM

## 2025-08-08 PROCEDURE — 63600175 PHARM REV CODE 636 W HCPCS: Performed by: STUDENT IN AN ORGANIZED HEALTH CARE EDUCATION/TRAINING PROGRAM

## 2025-08-08 PROCEDURE — 25000003 PHARM REV CODE 250: Performed by: UROLOGY

## 2025-08-08 RX ORDER — HYDROMORPHONE HYDROCHLORIDE 2 MG/ML
1 INJECTION, SOLUTION INTRAMUSCULAR; INTRAVENOUS; SUBCUTANEOUS ONCE
Status: COMPLETED | OUTPATIENT
Start: 2025-08-08 | End: 2025-08-08

## 2025-08-08 RX ORDER — LORAZEPAM 0.5 MG/1
1 TABLET ORAL EVERY 6 HOURS PRN
Qty: 20 TABLET | Refills: 0 | Status: SHIPPED | OUTPATIENT
Start: 2025-08-08 | End: 2025-08-13

## 2025-08-08 RX ORDER — OXYCODONE AND ACETAMINOPHEN 5; 325 MG/1; MG/1
1 TABLET ORAL EVERY 6 HOURS PRN
Qty: 20 TABLET | Refills: 0 | Status: SHIPPED | OUTPATIENT
Start: 2025-08-08 | End: 2025-08-13

## 2025-08-08 RX ORDER — AMOXICILLIN 250 MG
2 CAPSULE ORAL DAILY
Qty: 60 TABLET | Refills: 0 | Status: SHIPPED | OUTPATIENT
Start: 2025-08-08 | End: 2025-09-07

## 2025-08-08 RX ORDER — FINASTERIDE 5 MG/1
5 TABLET, FILM COATED ORAL DAILY
Qty: 30 TABLET | Refills: 2 | Status: SHIPPED | OUTPATIENT
Start: 2025-08-08 | End: 2025-11-06

## 2025-08-08 RX ORDER — OXYBUTYNIN CHLORIDE 15 MG/1
15 TABLET, EXTENDED RELEASE ORAL DAILY
Qty: 4 TABLET | Refills: 0 | Status: SHIPPED | OUTPATIENT
Start: 2025-08-08 | End: 2025-08-13 | Stop reason: SDUPTHER

## 2025-08-08 RX ORDER — MUPIROCIN 20 MG/G
OINTMENT TOPICAL 2 TIMES DAILY
Status: DISCONTINUED | OUTPATIENT
Start: 2025-08-08 | End: 2025-08-08 | Stop reason: HOSPADM

## 2025-08-08 RX ORDER — ALFUZOSIN HYDROCHLORIDE 10 MG/1
10 TABLET, EXTENDED RELEASE ORAL
Qty: 30 TABLET | Refills: 2 | Status: SHIPPED | OUTPATIENT
Start: 2025-08-08 | End: 2025-11-06

## 2025-08-08 RX ADMIN — LEVOFLOXACIN 500 MG: 250 TABLET, FILM COATED ORAL at 08:08

## 2025-08-08 RX ADMIN — HYDROMORPHONE HYDROCHLORIDE 1 MG: 2 INJECTION INTRAMUSCULAR; INTRAVENOUS; SUBCUTANEOUS at 01:08

## 2025-08-08 RX ADMIN — POLYETHYLENE GLYCOL 3350 17 G: 17 POWDER, FOR SOLUTION ORAL at 08:08

## 2025-08-08 RX ADMIN — VALSARTAN 320 MG: 80 TABLET, FILM COATED ORAL at 08:08

## 2025-08-08 RX ADMIN — SODIUM CHLORIDE: 9 INJECTION, SOLUTION INTRAVENOUS at 03:08

## 2025-08-08 RX ADMIN — MUPIROCIN: 20 OINTMENT TOPICAL at 08:08

## 2025-08-08 RX ADMIN — HYDROCODONE BITARTRATE AND ACETAMINOPHEN 1 TABLET: 7.5; 325 TABLET ORAL at 03:08

## 2025-08-08 RX ADMIN — HEPARIN SODIUM 5000 UNITS: 5000 INJECTION, SOLUTION INTRAVENOUS; SUBCUTANEOUS at 05:08

## 2025-08-10 LAB
OHS QRS DURATION: 86 MS
OHS QTC CALCULATION: 413 MS

## 2025-08-11 ENCOUNTER — PATIENT OUTREACH (OUTPATIENT)
Dept: ADMINISTRATIVE | Facility: CLINIC | Age: 80
End: 2025-08-11
Payer: MEDICARE

## 2025-08-13 ENCOUNTER — OFFICE VISIT (OUTPATIENT)
Dept: UROLOGY | Facility: CLINIC | Age: 80
End: 2025-08-13
Payer: MEDICARE

## 2025-08-13 ENCOUNTER — TELEPHONE (OUTPATIENT)
Dept: UROLOGY | Facility: CLINIC | Age: 80
End: 2025-08-13
Payer: MEDICARE

## 2025-08-13 VITALS
DIASTOLIC BLOOD PRESSURE: 49 MMHG | BODY MASS INDEX: 22.24 KG/M2 | WEIGHT: 155 LBS | OXYGEN SATURATION: 98 % | SYSTOLIC BLOOD PRESSURE: 118 MMHG | HEART RATE: 67 BPM

## 2025-08-13 DIAGNOSIS — C67.9 MALIGNANT NEOPLASM OF URINARY BLADDER, UNSPECIFIED SITE: Primary | ICD-10-CM

## 2025-08-13 DIAGNOSIS — N13.30 BILATERAL HYDRONEPHROSIS: ICD-10-CM

## 2025-08-13 DIAGNOSIS — N40.1 BENIGN PROSTATIC HYPERPLASIA WITH INCOMPLETE BLADDER EMPTYING: Chronic | ICD-10-CM

## 2025-08-13 DIAGNOSIS — R39.14 BENIGN PROSTATIC HYPERPLASIA WITH INCOMPLETE BLADDER EMPTYING: Chronic | ICD-10-CM

## 2025-08-13 DIAGNOSIS — N32.89 BLADDER SPASM: ICD-10-CM

## 2025-08-13 PROBLEM — I25.10 ATHEROSCLEROSIS OF CORONARY ARTERY WITHOUT ANGINA PECTORIS: Status: ACTIVE | Noted: 2025-08-13

## 2025-08-13 PROBLEM — K21.00 GASTRO-ESOPHAGEAL REFLUX DISEASE WITH ESOPHAGITIS: Status: ACTIVE | Noted: 2025-08-13

## 2025-08-13 PROCEDURE — 99999 PR PBB SHADOW E&M-EST. PATIENT-LVL IV: CPT | Mod: PBBFAC,,, | Performed by: NURSE PRACTITIONER

## 2025-08-13 PROCEDURE — 99214 OFFICE O/P EST MOD 30 MIN: CPT | Mod: PBBFAC | Performed by: NURSE PRACTITIONER

## 2025-08-13 PROCEDURE — 99214 OFFICE O/P EST MOD 30 MIN: CPT | Mod: S$PBB,,, | Performed by: NURSE PRACTITIONER

## 2025-08-13 RX ORDER — OXYBUTYNIN CHLORIDE 15 MG/1
TABLET, EXTENDED RELEASE ORAL
Qty: 90 TABLET | Refills: 1 | Status: SHIPPED | OUTPATIENT
Start: 2025-08-13

## (undated) DEVICE — GLOVE SURGICAL PROTEXIS PI CLASSIC SIZE 7.5

## (undated) DEVICE — GOWN NONREINF SET-IN SLV 2XL

## (undated) DEVICE — CATH DVR BL STRP 20FR 10ML

## (undated) DEVICE — Device

## (undated) DEVICE — BAG LEG, TWIST-VALVE, STRAPS M 19OZ

## (undated) DEVICE — SYR 30CC LUER LOCK

## (undated) DEVICE — GLOVE PROTEXIS PI SYN SURG 7.5

## (undated) DEVICE — GLOVE SURGICAL PROTEXIS PI BLUE SIZE 7.5

## (undated) DEVICE — SYR 50CC LL

## (undated) DEVICE — CATH URETH FOLEY 2W 18FR 10ML

## (undated) DEVICE — WATER STERILE 3000ML F/IRRIG

## (undated) DEVICE — SOL NACL IRR 1000ML BTL

## (undated) DEVICE — PROBE ULTRA PNEUMATIC

## (undated) DEVICE — BAG DRAINAGE UROLOGY

## (undated) DEVICE — SPONGE COTTON TRAY 4X4IN

## (undated) DEVICE — BAG LINGEMAN DRAIN UROLOGY

## (undated) DEVICE — SYR 50ML CATH TIP

## (undated) DEVICE — GUIDE WIRE SENSOR .035 150

## (undated) DEVICE — SOL NACL IRR 3000ML

## (undated) DEVICE — KIT LITHOCLAST TRILOGY 3.9X440

## (undated) DEVICE — BAG DRAIN ANTI REFLUX 4000ML

## (undated) DEVICE — SOL IRRI STRL WATER 1000ML

## (undated) DEVICE — SET CYSTO IRR DRP CHMBR 84IN

## (undated) DEVICE — GOWN POLY REINF BRTH SLV XL

## (undated) DEVICE — SOL IRRIGATION WATER 3000ML

## (undated) DEVICE — TUBE SUCTION MEDI-VAC STERILE

## (undated) DEVICE — KIT LITHOTOMY RUSH

## (undated) DEVICE — CANISTER SUCTION JUMBO 12L

## (undated) DEVICE — BAG URINARY LEG COMBO PACK STA

## (undated) DEVICE — GLOVE 6.5 PROTEXIS PI MICRO

## (undated) DEVICE — APPLICATOR CHLORAPREP ORN 26ML

## (undated) DEVICE — SYR ONLY LUER LOCK 20CC

## (undated) DEVICE — KIT CATH URTRL FLEXIMA 5FR

## (undated) DEVICE — BOWL STERILE LARGE 32OZ

## (undated) DEVICE — SET IRRIGATION IR-SERIES DISP -MIN ORDER 2 CASES

## (undated) DEVICE — SYR 10CC LUER LOCK

## (undated) DEVICE — GLOVE SENSICARE PI SURG 7.5

## (undated) DEVICE — DRAPE IOBAN 2 STERI

## (undated) DEVICE — TOWEL OR DISP STRL BLUE 4/PK

## (undated) DEVICE — GLOVE SENSICARE PI SURG 8

## (undated) DEVICE — CDS CYSTO

## (undated) DEVICE — CATH URETERAL DUAL LUMEN 10FR